# Patient Record
Sex: FEMALE | Race: WHITE | NOT HISPANIC OR LATINO | Employment: FULL TIME | URBAN - METROPOLITAN AREA
[De-identification: names, ages, dates, MRNs, and addresses within clinical notes are randomized per-mention and may not be internally consistent; named-entity substitution may affect disease eponyms.]

---

## 2017-02-22 ENCOUNTER — TELEPHONE (OUTPATIENT)
Dept: FAMILY MEDICINE | Facility: CLINIC | Age: 32
End: 2017-02-22

## 2017-02-22 NOTE — TELEPHONE ENCOUNTER
Called and spoke to patient, MyChart still pending.  [facility] and [facility] help [facility] for direction.  Verbalized good understanding.   Audra Ureña RN

## 2017-02-22 NOTE — TELEPHONE ENCOUNTER
Patient calling, she has had a sinus infection/cold going on for about 7 weeks, she was feeling better, but ears never fully resolved. Now she is having sinus symptoms again. She is wondering if she needs to be seen.

## 2017-02-22 NOTE — TELEPHONE ENCOUNTER
Per patient, has had sinus symptoms x 7 weeks.    Ears feel like there is a water sensation, coughing, sneezing.  Ear feels like they want to be cleaned constantly.  C/o sinus pain/pressure, worse with positional changes.   +PND.  Denies tooth pain.    Tried, Nyquil/dayquil, mucinex, ibuprofen,  Sinus tylenol.  Label said could not use Nasonex due to thyroid disorder.  Takes zyrtec everyday.    Has not tried neti pot or nasal saline rinse.       Patient is given [employer] help desk number, [phone]

## 2017-02-23 NOTE — TELEPHONE ENCOUNTER
Left message on answering machine for patient/parent to call back.   [phone].  Audra Ureña RN

## 2017-02-24 NOTE — TELEPHONE ENCOUNTER
Left message on answering machine for patient/parent to call back.   [phone].  Audra Ureña RN

## 2017-02-28 DIAGNOSIS — E03.9 HYPOTHYROIDISM: Primary | ICD-10-CM

## 2017-02-28 RX ORDER — LEVOTHYROXINE SODIUM 50 UG/1
50 TABLET ORAL DAILY
Qty: 30 TABLET | Refills: 0 | Status: SHIPPED | OUTPATIENT
Start: 2017-02-28 | End: 2017-03-23

## 2017-02-28 NOTE — LETTER
[facility]                                           [address] [place]  [address]    February 28, 2017    Lalitha Cortez  [address]   [place] [address]    Dear Lalitha,       We recently received a refill request for levothyroxine.  We have refilled this for a one time 30 day supply only because you are due for a:    Thyroid office visit and fasting lab appointment      Please schedule this lab appointment 4-5 days prior to the office visit.     Please call at your earliest convenience so that there will not be a delay with your future refills.          Thank you,   Your [facility] Care Team/joao  [phone]

## 2017-03-23 ENCOUNTER — OFFICE VISIT (OUTPATIENT)
Dept: FAMILY MEDICINE | Facility: CLINIC | Age: 32
End: 2017-03-23
Payer: COMMERCIAL

## 2017-03-23 VITALS
SYSTOLIC BLOOD PRESSURE: 108 MMHG | BODY MASS INDEX: 19.63 KG/M2 | DIASTOLIC BLOOD PRESSURE: 68 MMHG | TEMPERATURE: 98.2 F | WEIGHT: 115 LBS | HEART RATE: 80 BPM | HEIGHT: 64 IN

## 2017-03-23 DIAGNOSIS — E03.9 HYPOTHYROIDISM, UNSPECIFIED TYPE: Primary | ICD-10-CM

## 2017-03-23 LAB — TSH SERPL DL<=0.005 MIU/L-ACNC: 2.22 MU/L (ref 0.4–4)

## 2017-03-23 PROCEDURE — 36415 COLL VENOUS BLD VENIPUNCTURE: CPT | Performed by: PHYSICIAN ASSISTANT

## 2017-03-23 PROCEDURE — 84443 ASSAY THYROID STIM HORMONE: CPT | Performed by: PHYSICIAN ASSISTANT

## 2017-03-23 PROCEDURE — 99213 OFFICE O/P EST LOW 20 MIN: CPT | Performed by: PHYSICIAN ASSISTANT

## 2017-03-23 RX ORDER — LEVOTHYROXINE SODIUM 50 UG/1
50 TABLET ORAL DAILY
Qty: 90 TABLET | Refills: 3 | Status: SHIPPED | OUTPATIENT
Start: 2017-03-23 | End: 2018-04-20

## 2017-03-23 NOTE — NURSING NOTE
"Chief Complaint   Patient presents with     Thyroid Disease       Initial /68 (Cuff Size: Adult Regular)  Pulse 80  Temp 98.2  F (36.8  C) (Oral)  Ht 5' 4\" (1.626 m)  Wt 115 lb (52.2 kg)  BMI 19.74 kg/m2 Estimated body mass index is 19.74 kg/(m^2) as calculated from the following:    Height as of this encounter: 5' 4\" (1.626 m).    Weight as of this encounter: 115 lb (52.2 kg).  Medication Reconciliation: complete    KIRSTIE Ortiz MA    "

## 2017-03-23 NOTE — MR AVS SNAPSHOT
"              After Visit Summary   3/23/2017    Lalitha Cortez    MRN: [id number]           Patient Information     Date Of Birth          1985        Visit Information        Provider Department      3/23/2017 8:50 AM Kehr, Kristen M, PA-C [facility]        Today's Diagnoses     Hypothyroidism, unspecified type    -  1       Follow-ups after your visit        Who to contact     If you have questions or need follow up information about today's clinic visit or your schedule please contact [facility] directly at [phone].  Normal or non-critical lab and imaging results will be communicated to you by [employer], letter or phone within 4 business days after the clinic has received the results. If you do not hear from us within 7 days, please contact the clinic through [employer] or phone. If you have a critical or abnormal lab result, we will notify you by phone as soon as possible.  Submit refill requests through [employer] or call your pharmacy and they will forward the refill request to us. Please allow 3 business days for your refill to be completed.          Additional Information About Your Visit        MyChart Information     [employer] lets you send messages to your doctor, view your test results, renew your prescriptions, schedule appointments and more. To sign up, go to [email or web address] . Click on \"Log in\" on the left side of the screen, which will take you to the Welcome page. Then click on \"Sign up Now\" on the right side of the page.     You will be asked to enter the access code listed below, as well as some personal information. Please follow the directions to create your username and password.     Your access code is: [email or web address]  Expires: 2017  1:06 PM     Your access code will  in 90 days. If you need help or a new code, please call your [facility] or [phone].        Care EveryWhere ID     This is your Care EveryWhere ID. This could be used by other " "organizations to access your [place] medical records  [id number]        Your Vitals Were     Pulse Temperature Height BMI (Body Mass Index)          80 98.2  F (36.8  C) (Oral) 5' 4\" (1.626 m) 19.74 kg/m2         Blood Pressure from Last 3 Encounters:   03/23/17 108/68   12/07/16 116/64   10/06/16 104/67    Weight from Last 3 Encounters:   03/23/17 115 lb (52.2 kg)   12/07/16 113 lb (51.3 kg)   10/06/16 110 lb (49.9 kg)              We Performed the Following     TSH with free T4 reflex        Primary Care Provider Office Phone # Fax #    Kristen M Kehr, PA-C [phone]       [facility] [address] [address] [address]        Thank you!     Thank you for choosing [facility]  for your care. Our goal is always to provide you with excellent care. Hearing back from our patients is one way we can continue to improve our services. Please take a few minutes to complete the written survey that you may receive in the mail after your visit with us. Thank you!             Your Updated Medication List - Protect others around you: Learn how to safely use, store and throw away your medicines at www.disposemymeds.org.          This list is accurate as of: 3/23/17 12:33 PM.  Always use your most recent med list.                   Brand Name Dispense Instructions for use    diphenoxylate-atropine 2.5-0.025 MG per tablet    LOMOTIL    60 tablet    Take 2 tablets by mouth 4 times daily as needed for diarrhea       levothyroxine 50 MCG tablet    SYNTHROID    30 tablet    Take 1 tablet (50 mcg) by mouth daily       MULTIVITAMIN PO      Take  by mouth. Takes 2 daily       norgestim-eth estrad triphasic 0.18/0.215/0.25 MG-35 MCG per tablet    TRI-SPRINTEC    84 tablet    Take 1 tablet by mouth daily       ZYRTEC 10 MG tablet   Generic drug:  cetirizine      Take 10 mg by mouth daily.         "

## 2017-03-23 NOTE — LETTER
[facility]  [address] [address] [phone]        March 24, 2017    Lalitha Cortez  [address]   [place] [address]            Dear Lalitha,    The results of your recent thyroid tests were normal.  Below is a copy of the results.  It was a pleasure to see you at your last appointment.    If you have any questions or concerns, please call myself or my nurse at [phone].    Sincerely,    Kristen Kehr, PA-C/joao    Results for orders placed or performed in visit on 03/23/17   TSH with free T4 reflex   Result Value Ref Range    TSH 2.22 0.40 - 4.00 mU/L

## 2017-03-23 NOTE — PROGRESS NOTES
SUBJECTIVE:                                                    Lalitha Cortez is a 31 year old female who presents to clinic today for the following health issues:  Lalitha is here today for medication recheck for thyroid. She is doing well with her current dose of medication. She is taking 50 mcg daily.     Hypothyroidism Follow-up      Since last visit, patient describes the following symptoms: Weight stable, no hair loss, no skin changes, no constipation, no loose stools       Amount of exercise or physical activity:     Problems taking medications regularly: No    Medication side effects: none    Diet:         Problem list and histories reviewed & adjusted, as indicated.  Additional history: as documented    Patient Active Problem List   Diagnosis     CARDIOVASCULAR SCREENING; LDL GOAL LESS THAN 160     Seasonal allergic rhinitis     Acne     Giant papillary conjunctivitis     Conjunctivitis, allergic     Dry eyes     Blepharitis     Hypothyroidism     Breakthrough bleeding on depo provera     Past Surgical History:   Procedure Laterality Date     NO HISTORY OF SURGERY         Social History   Substance Use Topics     Smoking status: Former Smoker     Quit date: 8/1/2008     Smokeless tobacco: Never Used      Comment: Lives in smoke free household     Alcohol use Yes      Comment: 2/PER WEEK     Family History   Problem Relation Age of Onset     Neurologic Disorder Mother      MSAND LUPUS     Thyroid Disease Mother      Depression Father      Thyroid Disease Father      Depression Brother      Thyroid Disease Maternal Grandmother      CANCER Maternal Grandmother      Thyroid Disease Maternal Grandfather      Thyroid Disease Paternal Grandmother      Thyroid Disease Paternal Grandfather      DIABETES No family hx of      Hypertension No family hx of      CEREBROVASCULAR DISEASE No family hx of      Glaucoma No family hx of      Macular Degeneration No family hx of          Current Outpatient Prescriptions  "  Medication Sig Dispense Refill     levothyroxine (SYNTHROID) 50 MCG tablet Take 1 tablet (50 mcg) by mouth daily 30 tablet 0     norgestim-eth estrad triphasic (TRI-SPRINTEC) 0.18/0.215/0.25 MG-35 MCG per tablet Take 1 tablet by mouth daily 84 tablet 3     diphenoxylate-atropine (LOMOTIL) 2.5-0.025 MG per tablet Take 2 tablets by mouth 4 times daily as needed for diarrhea 60 tablet 0     Multiple Vitamins-Minerals (MULTIVITAMIN OR) Take  by mouth. Takes 2 daily       cetirizine (ZYRTEC) 10 MG tablet Take 10 mg by mouth daily.       Allergies   Allergen Reactions     Nkda [No Known Drug Allergies]      Seasonal Allergies        Reviewed and updated as needed this visit by clinical staff  Tobacco  Allergies  Meds  Med Hx  Surg Hx  Fam Hx  Soc Hx      Reviewed and updated as needed this visit by Provider         ROS:  Constitutional, HEENT, cardiovascular, pulmonary, gi and gu systems are negative, except as otherwise noted.    OBJECTIVE:                                                    /68 (Cuff Size: Adult Regular)  Pulse 80  Temp 98.2  F (36.8  C) (Oral)  Ht 5' 4\" (1.626 m)  Wt 115 lb (52.2 kg)  BMI 19.74 kg/m2  Body mass index is 19.74 kg/(m^2).  GENERAL: healthy, alert and no distress  NECK: no adenopathy, no asymmetry, masses, or scars and thyroid normal to palpation  MS: no gross musculoskeletal defects noted, no edema  SKIN: no suspicious lesions or rashes  PSYCH: mentation appears normal, affect normal/bright    Diagnostic Test Results:  none      ASSESSMENT/PLAN:                                                      1. Hypothyroidism, unspecified type  TSH done today.   Plan refill after results are back.   - TSH with free T4 reflex      Kristen M. Kehr, PA-C  [facility]  "

## 2017-08-31 ENCOUNTER — TELEPHONE (OUTPATIENT)
Dept: FAMILY MEDICINE | Facility: CLINIC | Age: 32
End: 2017-08-31

## 2017-08-31 NOTE — TELEPHONE ENCOUNTER
Patient informed last depo provera injection 10/6/16, started BCP 12/7/16.   Stopped OCP February or March, 2017.  Patient states she has read that it can take 1-2 years for her to get pregnant due to Depo Provera.   Informed patient that can become pregnant 13 week post Depo injection.  Patient notes has very irregular periods, but has been trying to count days of cycle.  Discussed this would be harder due to irregular menses.    Advise Ovulation predictor kits from pharmacy, monitor per instructions and have relations 3 days during surge of LH, hormone.  Advise patient to call back with questions or concerns.   The patient/parent agrees with the plan and verbalized good understanding.    FYI, Kristen Kehr, PA-C.  Audra Ureña RN

## 2017-11-14 ENCOUNTER — TELEPHONE (OUTPATIENT)
Dept: FAMILY MEDICINE | Facility: CLINIC | Age: 32
End: 2017-11-14

## 2017-11-14 NOTE — TELEPHONE ENCOUNTER
Congratulations...  Patient has schedule appointment tomorrow.  Patient is informed okay to take Levothyroxine.  Will wait to discuss allergy medications tomorrow with Kristen Kehr, PA-C.  Verbalized good understanding.   Audra Ureña RN

## 2017-11-15 ENCOUNTER — OFFICE VISIT (OUTPATIENT)
Dept: FAMILY MEDICINE | Facility: CLINIC | Age: 32
End: 2017-11-15
Payer: COMMERCIAL

## 2017-11-15 VITALS
WEIGHT: 112.8 LBS | OXYGEN SATURATION: 100 % | DIASTOLIC BLOOD PRESSURE: 68 MMHG | HEART RATE: 56 BPM | BODY MASS INDEX: 19.36 KG/M2 | SYSTOLIC BLOOD PRESSURE: 116 MMHG

## 2017-11-15 DIAGNOSIS — N92.6 MISSED MENSES: ICD-10-CM

## 2017-11-15 DIAGNOSIS — Z32.01 PREGNANCY TEST POSITIVE: Primary | ICD-10-CM

## 2017-11-15 LAB — BETA HCG QUAL IFA URINE: POSITIVE

## 2017-11-15 PROCEDURE — 84703 CHORIONIC GONADOTROPIN ASSAY: CPT | Performed by: PHYSICIAN ASSISTANT

## 2017-11-15 PROCEDURE — 99213 OFFICE O/P EST LOW 20 MIN: CPT | Performed by: PHYSICIAN ASSISTANT

## 2017-11-15 RX ORDER — PRENATAL VIT/IRON FUM/FOLIC AC 27MG-0.8MG
1 TABLET ORAL DAILY
COMMUNITY
End: 2022-02-16

## 2017-11-15 NOTE — PROGRESS NOTES
SUBJECTIVE:                                                    Lalitha Cortez is a 32 year old female who presents to clinic today for the following health issues:    Pt here to confirm pregnancy, had positive at home test.  LMP 10/14/17  Due date about 7/21/18    1st pregnancy. Mild fatigue. sensitive to smells, cramping. Mild spotting 2 days ago but none since.     No smoking.   Last ETOH was a glass of wine last Saturday. Doesn't regularly drink. Didn't know she was pregnant until yesterday with home test.     Problem list and histories reviewed & adjusted, as indicated.  Additional history: as documented    Patient Active Problem List   Diagnosis     CARDIOVASCULAR SCREENING; LDL GOAL LESS THAN 160     Seasonal allergic rhinitis     Acne     Giant papillary conjunctivitis     Conjunctivitis, allergic     Dry eyes     Blepharitis     Hypothyroidism     Breakthrough bleeding on depo provera     Past Surgical History:   Procedure Laterality Date     NO HISTORY OF SURGERY         Social History   Substance Use Topics     Smoking status: Former Smoker     Quit date: 8/1/2008     Smokeless tobacco: Never Used      Comment: Lives in smoke free household     Alcohol use Yes      Comment: 2/PER WEEK     Family History   Problem Relation Age of Onset     Neurologic Disorder Mother      MSAND LUPUS     Thyroid Disease Mother      Depression Father      Thyroid Disease Father      Depression Brother      Thyroid Disease Maternal Grandmother      CANCER Maternal Grandmother      Thyroid Disease Maternal Grandfather      Thyroid Disease Paternal Grandmother      Thyroid Disease Paternal Grandfather      DIABETES No family hx of      Hypertension No family hx of      CEREBROVASCULAR DISEASE No family hx of      Glaucoma No family hx of      Macular Degeneration No family hx of          Current Outpatient Prescriptions   Medication Sig Dispense Refill     Prenatal Vit-Fe Fumarate-FA (PRENATAL MULTIVITAMIN PLUS IRON) 27-0.8 MG  TABS per tablet Take 1 tablet by mouth daily       levothyroxine (SYNTHROID) 50 MCG tablet Take 1 tablet (50 mcg) by mouth daily 90 tablet 3     cetirizine (ZYRTEC) 10 MG tablet Take 10 mg by mouth daily.       Allergies   Allergen Reactions     Nkda [No Known Drug Allergies]      Seasonal Allergies      Labs reviewed in EPIC      ROS:  Constitutional, HEENT, cardiovascular, pulmonary, gi and gu systems are negative, except as otherwise noted.      OBJECTIVE:   /68  Pulse 56  Wt 112 lb 12.8 oz (51.2 kg)  LMP 10/14/2017  SpO2 100%  BMI 19.36 kg/m2  Body mass index is 19.36 kg/(m^2).  GENERAL: healthy, alert and no distress  Head: Normocephalic, atraumatic.  Eyes: Conjunctiva clear, non icteric. PERRLA.  Ears: External ears and TMs normal BL.  Nose: Septum midline, nasal mucosa pink and moist. No discharge.  Mouth / Throat: Normal dentition.  No oral lesions. Pharynx non erythematous, tonsils without hypertrophy.  Neck: Supple, no enlarged LN, trachea midline.  Heart: S1 and S2 normal, no murmurs, clicks, gallops or rubs. Regular rate and rhythm.  Chest: Clear; no wheezes or rales.  The abdomen is soft with diffuse mild tenderness, guarding, mass, rebound or organomegaly. Bowel sounds are normal.    Diagnostic Test Results:  Results for orders placed or performed in visit on 11/15/17 (from the past 24 hour(s))   Beta HCG qual IFA urine   Result Value Ref Range    Beta HCG Qual IFA Urine Positive (A) NEG^Negative          ASSESSMENT/PLAN:         ICD-10-CM    1. Pregnancy test positive Z32.01    2. Missed menses N92.6 Beta HCG qual IFA urine   no ETOH or smoking  No changing of cat litter.  warning signs discussed.  She is going to call insurance to see who is covered for OB and make follow up apt.     Williams Beck PA-C  [facility]

## 2017-11-15 NOTE — NURSING NOTE
"Chief Complaint   Patient presents with     Confirmation Of Pregnancy       Initial /68  Pulse 56  Wt 112 lb 12.8 oz (51.2 kg)  LMP 10/14/2017  SpO2 100%  BMI 19.36 kg/m2 Estimated body mass index is 19.36 kg/(m^2) as calculated from the following:    Height as of 3/23/17: 5' 4\" (1.626 m).    Weight as of this encounter: 112 lb 12.8 oz (51.2 kg).  Medication Reconciliation: complete  Kelly Vail CMA    "

## 2017-11-17 ENCOUNTER — TELEPHONE (OUTPATIENT)
Dept: FAMILY MEDICINE | Facility: CLINIC | Age: 32
End: 2017-11-17

## 2017-11-17 ENCOUNTER — TELEPHONE (OUTPATIENT)
Dept: OBGYN | Facility: CLINIC | Age: 32
End: 2017-11-17

## 2017-11-17 NOTE — TELEPHONE ENCOUNTER
Return call to patient. LMP 10-14-17. 4w 3d. She reported intermittent spotting that started on 11-09-17. Resolved on 11-10-17 then noted some on 11-15-17. Patient reported dark blood on toilet tissue after voiding and last noc a scant amt in her underwear. She denied abdomen cramping. Patient has been drinking a lot of water. Explained implantation bleed. Recommended pelvic rest until her FOB appt on 01-02-18. Encouraged her to call back to clinic if sxs change or worsen. Danii Bond RN, BAN

## 2017-11-17 NOTE — TELEPHONE ENCOUNTER
PT was confirmed on 11/15/2017, PT states that she is feeling cramps, spotting on the 15 th, color is a dark creamy brown. PT would like to know if she should worry this is her first pregnancy.Would like a call back.  Rufina Pacheco, [facility]

## 2017-11-17 NOTE — TELEPHONE ENCOUNTER
See post telephone encounter 11/17/17, [facility]/OB, addressed by RN.   Has scheduled appointment with OB dept.   Will close encounter.  Audra Ureña RN

## 2017-11-17 NOTE — TELEPHONE ENCOUNTER
Patient states her pregnancy was confirmed earlier this week and she would like your recommendation on an OB provider that she should see.  States she is 5 weeks along and spotting so she is being transferred to OB nurse hotline.  Please call.    Thank you.

## 2017-11-20 ENCOUNTER — NURSE TRIAGE (OUTPATIENT)
Dept: NURSING | Facility: CLINIC | Age: 32
End: 2017-11-20

## 2017-11-20 ENCOUNTER — TELEPHONE (OUTPATIENT)
Dept: OBGYN | Facility: CLINIC | Age: 32
End: 2017-11-20

## 2017-11-20 DIAGNOSIS — O20.9 BLEEDING IN EARLY PREGNANCY: Primary | ICD-10-CM

## 2017-11-20 NOTE — TELEPHONE ENCOUNTER
Lalitha is bleeding vaginally and just found out that she was pregnant.    Bleeding has been going on for four days, in the am.  Lalitha has not   Been in for first ob visit.

## 2017-11-20 NOTE — TELEPHONE ENCOUNTER
Patient calling, she was seen in ER today for vaginal bleeding during pregnancy. They suggested she come in for a repeat HCG test on Wednesday.

## 2017-11-21 NOTE — TELEPHONE ENCOUNTER
Patient was seen at [facility] yesterday.  Ultrasound showed no visible intrauterine pregnancy, possible spontaneous , non-visualized ectopic pregnancy.  HCG qualitative was positive.  HCG quantity was 581.  Patient is O positive.  Told to repeat HCG quant on 2017. I spoke with Dr. Agbeh in clinic today.  Orders placed for blood work tomorrow 2017 and again in 1 week.  He will order the ultrasound once the HCG level reaches a high enough level.  Noted patient has an appointment with lab tomorrow at 7:50am.  NEed current update on bleeding.  Left a message for patient to call back at [phone] and ask for Viktoria in women's.  Viktoria Last RN

## 2017-11-22 ENCOUNTER — TELEPHONE (OUTPATIENT)
Dept: OBGYN | Facility: CLINIC | Age: 32
End: 2017-11-22

## 2017-11-22 ENCOUNTER — NURSE TRIAGE (OUTPATIENT)
Dept: NURSING | Facility: CLINIC | Age: 32
End: 2017-11-22

## 2017-11-22 DIAGNOSIS — O20.9 BLEEDING IN EARLY PREGNANCY: ICD-10-CM

## 2017-11-22 LAB — B-HCG SERPL-ACNC: 242 IU/L (ref 0–5)

## 2017-11-22 PROCEDURE — 36415 COLL VENOUS BLD VENIPUNCTURE: CPT | Performed by: OBSTETRICS & GYNECOLOGY

## 2017-11-22 PROCEDURE — 84702 CHORIONIC GONADOTROPIN TEST: CPT | Performed by: OBSTETRICS & GYNECOLOGY

## 2017-11-22 NOTE — TELEPHONE ENCOUNTER
I called the patient and reviewed her serial quant values and US results.  She understands that she is miscarrying and would prefer to avoid surgical intervention at this time.  Her blood type is O+ so she is not a rhogam candidate.  All her questions were addressed and she feels that she has a good support system.  Will f/u with Dr. Agbeh next week or earlier prn.

## 2017-11-22 NOTE — TELEPHONE ENCOUNTER
Lalitha is calling for results of HCG Lab.  FNA relayed results and Lalitha is requesting to speak with clinic direct.  FNA transferred through to [facility]/OB.

## 2017-11-22 NOTE — TELEPHONE ENCOUNTER
Patient was transferred to primary care but should have been sent to OB.  Transferred patient to the OB department.    Patient states she is 5 weeks pregnant.  Went to ER for brown spotting.  She had hcg 581 and 48 hours later hcg 242.  This morning it was bright red blood on the tissue paper and toilet.  Has lightened up since an hour ago.    Mayda Guerrier RN,   [facility]

## 2017-11-22 NOTE — TELEPHONE ENCOUNTER
Patient is calling to follow up on her lab results from today. [phone] Please return patient's call this morning if possible She states that her brown spotting has turned a red color.

## 2017-11-22 NOTE — TELEPHONE ENCOUNTER
Called patient to reschedule an upcoming appointment due to provider being out.  Patient states she had labs drawn this morning and is looking for the results.  Patient nervous as she is having bright red bleeding.  Please review results.

## 2017-11-22 NOTE — TELEPHONE ENCOUNTER
"Blind transfer from primary care. Patient requested lab results. Noted FNA RN gave results without physician's review or orders. Patient stated she was seen in [facility] ER on 11-20-17 and her HCG was 581. Patient was to repeat in 48 hours. Patient stated she has been waiting for lab results as this has been the \"longest 48 hours of my life.\" Patient reported yesterday she had some lower back pain but \"not too bad today.\" Patient denied abdominal cramping. Patient stated about one hour ago she started with bright red bleeding. Patient stated she \"just want to know if this means I am miscarrying.\" Patient reported LMP 10-14-17. Patient 11-21-17 notes patient is O+ blood type.   Explained Dr. Agbeh is not in clinic today. Offered her an appt with another physician today and patient declined. She stated she just wants to know if she is having a miscarriage. Explained will consult Dr. Hawley as she is the physician on site and will call back. Verified call back number.  Message handled by Nurse Triage with [facility] - provider name: Dr. Hawley.  Agreed to call patient.    Will route to Dr. Hawley for review & orders. Danii Bond, FIORELLA, BAN      "

## 2017-11-24 ENCOUNTER — NURSE TRIAGE (OUTPATIENT)
Dept: NURSING | Facility: CLINIC | Age: 32
End: 2017-11-24

## 2017-11-24 NOTE — TELEPHONE ENCOUNTER
Lalitha is currently having a miscarriage.  Lalitha is having back pain and cramps and waking her up.  Lalitha has questions regarding miscarriage and symptoms.  No bleeding heavily or severe cramping.

## 2017-11-24 NOTE — TELEPHONE ENCOUNTER
Additional Information    Negative: Shock suspected (e.g., cold/pale/clammy skin, too weak to stand, low BP, rapid pulse)    Negative: Difficult to awaken or acting confused  (e.g., disoriented, slurred speech)    Negative: Passed out (i.e., lost consciousness, collapsed and was not responding)    Negative: Sounds like a life-threatening emergency to the triager    Negative: [1] Threatened miscarriage (threatened ) suspected AND [2] has not been examined by a HCP    Negative: [1] Abdominal pain is main symptom and [2] NO vaginal bleeding in past 24 hours    Negative: [1] Vaginal bleeding is main symptom and [2] more than 4 weeks since medical visit    Negative: Not pregnant or pregnancy status unknown    Negative: SEVERE abdominal pain    Negative: [1] SEVERE vaginal bleeding(i.e., soaking 2 pads / hour, large blood clots) AND [2] present 2 or more hours    Negative: [1] MODERATE vaginal bleeding (i.e., soaking 1 pad / hour; clots) AND [2] present > 6 hours    Negative: Passed tissue (e.g., gray-white)    Negative: Pale skin (pallor) of new onset or worsening    Negative: Patient sounds very sick or weak to the triager    Negative: [1] Constant abdominal pain AND [2] present > 2 hours    Negative: Caller has URGENT question and triager unable to answer question    Negative: Caller has NON-URGENT question and triager unable to answer question    Negative: MILD vaginal bleeding (i.e., less than one pad / hour)    Negative: Symptoms worsening  (i.e., vaginal bleeding, abdominal pain)    Negative: Ultrasound was NOT performed    Negative: Ultrasound was NOT normal OR caller does not know what ultrasound showed (i.e., live intrauterine pregnancy with fetal cardiac activity [heart beat])    Negative: Not feeling pregnant any longer (e.g., breast tenderness has disappeared)    Negative: SPOTTING (i.e., pinkish / brownish mucous discharge; 1-2 pads a day; no clots)    Negative: Symptoms have improved or gone away   (i.e., vaginal bleeding, abdominal pain)    Negative: Ultrasound showed a normal pregnancy in the uterus (i.e., live intrauterine pregnancy with fetal cardiac activity [heart beat])    Threatened , questions about    Protocols used:  - THREATENED [facility]CARRIAGE [facility]

## 2017-11-27 ENCOUNTER — OFFICE VISIT (OUTPATIENT)
Dept: OBGYN | Facility: CLINIC | Age: 32
End: 2017-11-27
Payer: COMMERCIAL

## 2017-11-27 ENCOUNTER — TELEPHONE (OUTPATIENT)
Dept: OBGYN | Facility: CLINIC | Age: 32
End: 2017-11-27

## 2017-11-27 VITALS
HEART RATE: 83 BPM | TEMPERATURE: 97.7 F | HEIGHT: 64 IN | BODY MASS INDEX: 19.29 KG/M2 | DIASTOLIC BLOOD PRESSURE: 63 MMHG | SYSTOLIC BLOOD PRESSURE: 115 MMHG | WEIGHT: 113 LBS

## 2017-11-27 DIAGNOSIS — O02.1 MISSED ABORTION: Primary | ICD-10-CM

## 2017-11-27 LAB — B-HCG SERPL-ACNC: 666 IU/L (ref 0–5)

## 2017-11-27 PROCEDURE — 99203 OFFICE O/P NEW LOW 30 MIN: CPT | Performed by: ADVANCED PRACTICE MIDWIFE

## 2017-11-27 PROCEDURE — 36415 COLL VENOUS BLD VENIPUNCTURE: CPT | Performed by: ADVANCED PRACTICE MIDWIFE

## 2017-11-27 PROCEDURE — 84702 CHORIONIC GONADOTROPIN TEST: CPT | Performed by: ADVANCED PRACTICE MIDWIFE

## 2017-11-27 ASSESSMENT — PAIN SCALES - GENERAL: PAINLEVEL: MODERATE PAIN (5)

## 2017-11-27 NOTE — MR AVS SNAPSHOT
After Visit Summary   2017    Lalitha Cortez    MRN: [id number]           Patient Information     Date Of Birth          1985        Visit Information        Provider Department      2017 1:30 PM Tesha Omer APRN CNM [facility]        Today's Diagnoses     Missed     -  1       Follow-ups after your visit        Your next 10 appointments already scheduled     Dec 06, 2017  8:15 AM CST   Office Visit with Kranthi Jernigan MD   [facility] ([facility])    [address] [place] [id number] [phone]           Bring a current list of meds and any records pertaining to this visit. For Physicals, please bring immunization records and any forms needing to be filled out. Please arrive 10 minutes early to complete paperwork.              Who to contact     If you have questions or need follow up information about today's clinic visit or your schedule please contact [facility] directly at [phone].  Normal or non-critical lab and imaging results will be communicated to you by PaymentOnehart, letter or phone within 4 business days after the clinic has received the results. If you do not hear from us within 7 days, please contact the clinic through Karisma Kidz or phone. If you have a critical or abnormal lab result, we will notify you by phone as soon as possible.  Submit refill requests through Karisma Kidz or call your pharmacy and they will forward the refill request to us. Please allow 3 business days for your refill to be completed.          Additional Information About Your Visit        PaymentOnehart Information     Karisma Kidz gives you secure access to your electronic health record. If you see a primary care provider, you can also send messages to your care team and make appointments. If you have questions, please call your primary care clinic.  If you do not have a primary care provider, please call [phone] and  "they will assist you.        Care EveryWhere ID     This is your Care EveryWhere ID. This could be used by other organizations to access your [place] medical records  [id number]        Your Vitals Were     Pulse Temperature Height Last Period BMI (Body Mass Index)       83 97.7  F (36.5  C) (Oral) 5' 4.25\" (1.632 m) 10/14/2017 (Exact Date) 19.25 kg/m2        Blood Pressure from Last 3 Encounters:   11/27/17 115/63   11/15/17 116/68   03/23/17 108/68    Weight from Last 3 Encounters:   11/27/17 113 lb (51.3 kg)   11/15/17 112 lb 12.8 oz (51.2 kg)   03/23/17 115 lb (52.2 kg)              We Performed the Following     HCG quantitative pregnancy          Today's Medication Changes          These changes are accurate as of: 11/27/17  2:32 PM.  If you have any questions, ask your nurse or doctor.               Stop taking these medicines if you haven't already. Please contact your care team if you have questions.     ZYRTEC 10 MG tablet   Generic drug:  cetirizine   Stopped by:  Tesha Omer APRN CNM                    Primary Care Provider Office Phone # Fax #    Kristen M Kehr, PA-C [phone] [address] [facility] [address]        Equal Access to Services     PINKY HOUSER AH: Hadii kallie ku hadasho Soomaali, waaxda luqadaha, qaybta kaalmada antonia, rigoberto yu. So [facility] [phone].    ATENCIÓN: Si habla español, tiene a heredia disposición servicios gratuitos de asistencia lingüística. Nichelle al [phone].    We comply with applicable federal civil rights laws and [place] laws. We do not discriminate on the basis of race, color, national origin, age, disability, sex, sexual orientation, or gender identity.            Thank you!     Thank you for choosing [facility]  for your care. Our goal is always to provide you with excellent care. Hearing back from our patients is one way we can continue to improve our services. Please take a few " minutes to complete the written survey that you may receive in the mail after your visit with us. Thank you!             Your Updated Medication List - Protect others around you: Learn how to safely use, store and throw away your medicines at www.disposemymeds.org.          This list is accurate as of: 11/27/17  2:32 PM.  Always use your most recent med list.                   Brand Name Dispense Instructions for use Diagnosis    levothyroxine 50 MCG tablet    SYNTHROID    90 tablet    Take 1 tablet (50 mcg) by mouth daily    Hypothyroidism, unspecified type       prenatal multivitamin plus iron 27-0.8 MG Tabs per tablet      Take 1 tablet by mouth daily

## 2017-11-27 NOTE — PROGRESS NOTES
Lalitha Cortez is a 32 year old female presenting the the clinic today for a follow up after a spontaneous miscarriage diagnosed last week Pt was approximately 5-6 weeks pregnant at the time of the loss.  She continues to have back pain that she rates as 3-4/10 and left sided abdominal discomfort.   Continues to have small amounts of spotting  Emotions expressed are appropriate in relationship to the miscarriage.  Patient Active Problem List   Diagnosis     CARDIOVASCULAR SCREENING; LDL GOAL LESS THAN 160     Seasonal allergic rhinitis     Acne     Conjunctivitis, allergic     Dry eyes     Blepharitis     Hypothyroidism       Current Outpatient Prescriptions:      Prenatal Vit-Fe Fumarate-FA (PRENATAL MULTIVITAMIN PLUS IRON) 27-0.8 MG TABS per tablet, Take 1 tablet by mouth daily, Disp: , Rfl:      levothyroxine (SYNTHROID) 50 MCG tablet, Take 1 tablet (50 mcg) by mouth daily, Disp: 90 tablet, Rfl: 3  Past Medical History:   Diagnosis Date     GERD (gastroesophageal reflux disease)      Hypothyroid      Intradermal nevus 03/27/2007    right arm-irritated intradermal nevus     Past Surgical History:   Procedure Laterality Date     NO HISTORY OF SURGERY           REVIEW OF SYMPTOMS   ROS: 10 point ROS neg other than the symptoms noted above in the HPI.    EXAM  Vulva: No eternal lesions, normal hair distribution.  BUS: not enlarged  Vagina: Normal discharge well rugated, no lesions noted   Cervix:  Smooth, without visible lesions or discharge.  No CMT  Uterus:  Normal size anteverted, non tender  Adnexa:  Without masses or tenderness.   Blood type: O pos   Beta on 11/20 was 581 and on 11/22 242  ASSESSMENT  Complete spontaneous miscarriage   Appropriate grieving    PLAN  Discussed possible causes of this SAB and risk for future SAB  Encouraged to postpone pregnancy attempts until after next menses  Continue PNV  or folic acid and maintenance of a healthy life style while  avoiding harmful behaviors such as alcohol,   tobacco and drug use.   Recheck beta today and has follow up next week with Dr Jernigan.    Visit length 30 min with >50% spent in counseling regarding current loss, grieving process and future pregnancies.

## 2017-11-27 NOTE — TELEPHONE ENCOUNTER
"Phone call from patient. Patient stated she knows she is having a miscarriage thinks it started on 11-22-17. Patient has had various amts of bleeding since then but no heavy bleeding or severe abdominal cramping. Patient reported concerns about possible ectopic pregnancy as she has LLQ pain over the past several days. Patient stated the area is sensitive to touch and \"feels hard.\" Patient stated some times pain is 8/10 but today it is 5/10. Patient stated she is using a heating pad and Tylenol and not relieving pain. Explained she need to be seen in clinic to be assessed and tx plan established. Patient agreed to be seen by BOOM Cha, CNP today at 1330. Danii Bond RN, BAN      "

## 2017-11-28 ENCOUNTER — TELEPHONE (OUTPATIENT)
Dept: OBGYN | Facility: CLINIC | Age: 32
End: 2017-11-28

## 2017-11-28 NOTE — TELEPHONE ENCOUNTER
Patient is aware she needs an ultrasound and an appointment with Dr. Ghosh today.  I called and spoke with her.  She continues to have left sided abdominal pain along with vaginal bleeding.  She is scheduled today at 1:10pm in [place] for the ultrasound.  She is aware to have a full bladder. She then follows up with Dr. Ghosh in [place].  We discussed the possibility of surgery and recovery both physical and mental.  I talked with her about the importance of the ultrasound and how that will determine the next step.  Patient understands and was thankful for me taking the time to talk her through this emotional time.  Viktoria Last RN

## 2017-11-28 NOTE — TELEPHONE ENCOUNTER
Pt called requesting to be scheduled right away for her US and appt with Dr. Ghosh.    Pt is scheduled with US at 1:10 today and Dr. Ghosh at 2:30 today.    Jessica Davis RN

## 2017-11-29 ENCOUNTER — NURSE TRIAGE (OUTPATIENT)
Dept: NURSING | Facility: CLINIC | Age: 32
End: 2017-11-29

## 2017-11-29 ENCOUNTER — TELEPHONE (OUTPATIENT)
Dept: OBGYN | Facility: OTHER | Age: 32
End: 2017-11-29

## 2017-11-29 NOTE — TELEPHONE ENCOUNTER
LM for the patient to return call to the clinic to discuss the below. Will await to hear from patient. Erin Leigh RN, BSN

## 2017-11-29 NOTE — TELEPHONE ENCOUNTER
Patient is on Oxycodone and Tylenol and is wondering if she can take a stool softner? She has a thyroid condition and ulcer and just wants to be sure.   Dr. Ch is her doctor, however she is not in, so just looking for nurse advice.

## 2017-11-30 NOTE — TELEPHONE ENCOUNTER
Dr Jernigan paged at 7:23pm  Lalitha Cortez, 1985,   [id number]  Surgery yesterday for ectopic pregnancy. Abdomen is very bloated and she is very uncomfortable.   [phone].  Rufina Doty will call back at 7:35pm if she needs a second page.  Rufina PITT RN [facility]

## 2017-12-01 ENCOUNTER — TELEPHONE (OUTPATIENT)
Dept: OBGYN | Facility: OTHER | Age: 32
End: 2017-12-01

## 2017-12-01 NOTE — PROGRESS NOTES
Case Report   Surgical Pathology                                Case: [id number]                                    Authorizing Provider:  Tatianna Ch DO     Collected:           11/28/2017 02:40 PM           Ordering Location:     [facility]       Received:            11/28/2017 04:42 PM                                  Operating Room                                                                Pathologist:           Lottie Ross MD                                                          Specimen:    Ectopic Pregnancy, Left Salpingotomy, left adenexal mass, likely ectopic                   Final Diagnosis   Fallopian tube, left salpingectomy: Cross sections of fallopian tube with adherent degenerating fibrin.   Electronically signed by Lottie Ross MD on 11/30/2017 at 1252   Comment        No chorionic villi are identified. Ultrasound failed to demonstrate a gestational sac within the adnexal structures. Clinical correlation is recommended.   Clinical Information  Left ectopic pregnancy    Gross Description        Left adnexal mass: Is a 2.5 cm in length, 0.8 cm diameter segment of fimbriated fallopian tube which is serially sectioned to reveal a pinpoint lumen. No hemorrhage or villous tissue is identified within the fallopian tube. Additionally received within the container is a 2.5 x 2.0 x 0.6 cm aggregate of blood clot and papillary tissue. No fetal parts or definitive chorionic villi are identified. The specimen is submitted entirely as follows:  A1-fallopian tube  A2-additional tissue fragments with blood clot  (CM)   Microscopic Description        Received is a fallopian tube with focal adherent fibrin along the external surface. The fimbria are intact. In addition fragments of laminated fibrin are identified. No chorionic villi are demonstrated       Subjective  32 year old non-pregnant female presents today as a post op from an operative laparoscopy, left salpingectomy for a  ruptured ectopic pregnancy on 11/28/17.  Patient states she is still having a dull abdominal ache.  No vaginal bleeding for the last 2 days.  Some vaginal spotting prior to this.  No problems urinating.  Normal bowel movements.  We reviewed patient's pathology and pictures from surgery.  All questions answered.  Patient has been having some breast pain and we discussed how this is likely hormonal and to continue to monitor it.  We discussed getting a quant today.  Her last was last week and was 12.  Will follow her closely with quants for her next pregnancy.  They have many questions regarding timing next pregnancy and statistics for next pregnancy.  We discussed an HSG and pelvic ultrasound due to the amount of blood that was seen in her uterus at the time of surgery.         ROS: 10 point ROS neg other than the symptoms noted above in the HPI.  Past Medical History:   Diagnosis Date     GERD (gastroesophageal reflux disease)      Hypothyroid      Intradermal nevus 03/27/2007    right arm-irritated intradermal nevus     Past Surgical History:   Procedure Laterality Date     NO HISTORY OF SURGERY       Family History   Problem Relation Age of Onset     Neurologic Disorder Mother      MSAND LUPUS     Thyroid Disease Mother      Depression Father      Thyroid Disease Father      Depression Brother      Thyroid Disease Maternal Grandmother      CANCER Maternal Grandmother      Thyroid Disease Maternal Grandfather      Thyroid Disease Paternal Grandmother      Thyroid Disease Paternal Grandfather      DIABETES No family hx of      Hypertension No family hx of      CEREBROVASCULAR DISEASE No family hx of      Glaucoma No family hx of      Macular Degeneration No family hx of      Social History   Substance Use Topics     Smoking status: Former Smoker     Quit date: 8/1/2008     Smokeless tobacco: Never Used      Comment: Lives in smoke free household     Alcohol use No         Objective  Vitals: /64  Pulse 79  Wt  111 lb (50.3 kg)  LMP 10/14/2017 (Exact Date)  BMI 18.91 kg/m2  BMI= Body mass index is 18.91 kg/(m^2).    General appearance=no deformities noted  Psych=mood is stable  Breast:  Benign exam, no masses palpated.  No skin changes, no axillary lymphadenopathy, no nipple discharge.  Axilla feel completely normal, no lymph node enlargement and non-tender.  Abd=incisions=healed well, suture trimmed from right lower quadrant      Assessment  1.)  S/p operative laparoscopy, left salpingectomy on 11/28/17 for ruptured ectopic pregnancy  2.)  Breast tenderness      Plan  1.)  Quant  2.)  Pelvic ultrasound  3.)  HSG      40 minutes was spent face to face with the patient today discussing her history, diagnosis, and follow-up plan as noted above.  Over 50% of the visit was spent in counseling and coordination of care.        Nursing notes read and reviewed    Tatianna Ch

## 2017-12-01 NOTE — TELEPHONE ENCOUNTER
Spoke with patient.  Better than she was.   Gas/bloating feeling is getting better.  Also has a migraine. States that's that she's not drinking enough water. Advised to increase the water.  Try things that relax her, like shower and rest, movies  If no improvement or worsening should be seen sooner   Bleeding has slowed and noticed when wiping.  Pain has been improving slowly.    Will continue home cares and return call if anything worsens of if she has any questions or concerns.    Westley Mcdonald, RN, BSN

## 2017-12-01 NOTE — TELEPHONE ENCOUNTER
Please let patient know it is likely related to the pregnancy.  Even though she had an ectopic pregnancy that resulted in surgery, it will still take time for her hormone levels to decrease.  She should follow up if it persists in the next week or so.    Tatianna Ch

## 2017-12-01 NOTE — TELEPHONE ENCOUNTER
Pt seen 11/27/17 by [facility] and she was told lump was nothing to worry about. Now that she is no longer pregnant she asked that you review and advise.   Lalitha Cortez is a 32 year old female who calls with lump in rt breast.    NURSING ASSESSMENT:  Description:  Pt called to report that she has a lump in her right breast.   Onset/duration:  5-6 weeks ago  Precip. factors:  1st noticed when she became pregnant and the size has been increasing quickly since she first noticed it  Associated symptoms:  No redness, no nipple changes or discharge  Pain: tender  LMP/preg/breast feeding:  10/14/17, surgery 11/29/17 for ectopic pregnancy   Last exam/Treatment:  OV with TONE Hopeil Day 11/27/17  Allergies:   Allergies   Allergen Reactions     Nkda [No Known Drug Allergies]      Seasonal Allergies        RECOMMENDED DISPOSITION:  Sent to provider to review  Will comply with recommendation: will wait for callback  If further questions/concerns or if symptoms do not improve, worsen or new symptoms develop, call your PCP or [facility] as soon as possible.    Guideline used: Breast pain and breast lump  Telephone Triage for Obstetrics and Gynecology, Chelsea Dan and Kenna Winkler RN

## 2017-12-01 NOTE — TELEPHONE ENCOUNTER
Please call patient and see how she is doing/feeling. She had surgery for an ectopic pregnancy on Tuesday.  Thanks.    Tatianna Ch

## 2017-12-07 ENCOUNTER — TELEPHONE (OUTPATIENT)
Dept: OBGYN | Facility: OTHER | Age: 32
End: 2017-12-07

## 2017-12-07 NOTE — TELEPHONE ENCOUNTER
I called and spoke to patient.  Patient has a quant scheduled for tomorrow.  She states she is doing well.  I will see her next week.  All questions answered and patient verbalizes understanding.    Tatianna Ch

## 2017-12-08 ENCOUNTER — TELEPHONE (OUTPATIENT)
Dept: OBGYN | Facility: OTHER | Age: 32
End: 2017-12-08

## 2017-12-08 DIAGNOSIS — O20.9 BLEEDING IN EARLY PREGNANCY: ICD-10-CM

## 2017-12-08 LAB — B-HCG SERPL-ACNC: 12 IU/L (ref 0–5)

## 2017-12-08 PROCEDURE — 36415 COLL VENOUS BLD VENIPUNCTURE: CPT | Performed by: FAMILY MEDICINE

## 2017-12-08 PROCEDURE — 84702 CHORIONIC GONADOTROPIN TEST: CPT | Performed by: FAMILY MEDICINE

## 2017-12-08 NOTE — TELEPHONE ENCOUNTER
Patient called back.  She states she has an appointment on Monday with Dr Arthur, but was wondering if she would be able to start trying to get pregnant again.  Informed her that she would need to discuss that with provider at her appointment.    Priyanka Barbour, FRANCIS  December 8, 2017

## 2017-12-11 ENCOUNTER — MYC MEDICAL ADVICE (OUTPATIENT)
Dept: OBGYN | Facility: OTHER | Age: 32
End: 2017-12-11

## 2017-12-11 ENCOUNTER — OFFICE VISIT (OUTPATIENT)
Dept: OBGYN | Facility: OTHER | Age: 32
End: 2017-12-11
Payer: COMMERCIAL

## 2017-12-11 VITALS
HEART RATE: 79 BPM | BODY MASS INDEX: 18.91 KG/M2 | DIASTOLIC BLOOD PRESSURE: 64 MMHG | WEIGHT: 111 LBS | SYSTOLIC BLOOD PRESSURE: 104 MMHG

## 2017-12-11 DIAGNOSIS — Z87.59 HISTORY OF ECTOPIC PREGNANCY: ICD-10-CM

## 2017-12-11 DIAGNOSIS — N91.2 ABSENCE OF MENSTRUATION: ICD-10-CM

## 2017-12-11 DIAGNOSIS — O00.102 LEFT TUBAL PREGNANCY WITHOUT INTRAUTERINE PREGNANCY: Primary | ICD-10-CM

## 2017-12-11 LAB — B-HCG SERPL-ACNC: 8 IU/L (ref 0–5)

## 2017-12-11 PROCEDURE — 99024 POSTOP FOLLOW-UP VISIT: CPT | Performed by: OBSTETRICS & GYNECOLOGY

## 2017-12-11 PROCEDURE — 84702 CHORIONIC GONADOTROPIN TEST: CPT | Performed by: OBSTETRICS & GYNECOLOGY

## 2017-12-11 PROCEDURE — 36415 COLL VENOUS BLD VENIPUNCTURE: CPT | Performed by: OBSTETRICS & GYNECOLOGY

## 2017-12-11 RX ORDER — CETIRIZINE HYDROCHLORIDE 10 MG/1
10 TABLET ORAL
COMMUNITY
End: 2018-04-17

## 2017-12-11 ASSESSMENT — PAIN SCALES - GENERAL: PAINLEVEL: MILD PAIN (2)

## 2017-12-11 NOTE — MR AVS SNAPSHOT
After Visit Summary   12/11/2017    Lalitha Cortez    MRN: [id number]           Patient Information     Date Of Birth          1985        Visit Information        Provider Department      12/11/2017 11:45 AM Tatianna Ch,  [facility]        Today's Diagnoses     Left tubal pregnancy without intrauterine pregnancy    -  1    Absence of menstruation        History of ectopic pregnancy          Care Instructions    Please call if you any questions.    [facility]  [address] [place]   [address]  [phone]        Tatianna Ch            Follow-ups after your visit        Follow-up notes from your care team     Return if symptoms worsen or fail to improve.      Future tests that were ordered for you today     Open Future Orders        Priority Expected Expires Ordered    US Pelvic Complete with Transvaginal Routine  3/11/2018 12/11/2017    XR Hysterosalpingogram Routine 12/11/2017 12/11/2018 12/11/2017            Who to contact     If you have questions or need follow up information about today's clinic visit or your schedule please contact [facility] directly at [phone].  Normal or non-critical lab and imaging results will be communicated to you by Zervehart, letter or phone within 4 business days after the clinic has received the results. If you do not hear from us within 7 days, please contact the clinic through Zervehart or phone. If you have a critical or abnormal lab result, we will notify you by phone as soon as possible.  Submit refill requests through Capstory or call your pharmacy and they will forward the refill request to us. Please allow 3 business days for your refill to be completed.          Additional Information About Your Visit        MyChart Information     Capstory gives you secure access to your electronic health record. If you see a primary care provider, you can also send messages to your care team and make  appointments. If you have questions, please call your primary care clinic.  If you do not have a primary care provider, please call [phone] and they will assist you.        Care EveryWhere ID     This is your Care EveryWhere ID. This could be used by other organizations to access your [place] medical records  [id number]        Your Vitals Were     Pulse Last Period BMI (Body Mass Index)             79 10/14/2017 (Exact Date) 18.91 kg/m2          Blood Pressure from Last 3 Encounters:   12/11/17 104/64   11/27/17 115/63   11/15/17 116/68    Weight from Last 3 Encounters:   12/11/17 111 lb (50.3 kg)   11/27/17 113 lb (51.3 kg)   11/15/17 112 lb 12.8 oz (51.2 kg)              We Performed the Following     HCG quantitative pregnancy        Primary Care Provider Office Phone # Fax #    Kristen M Kehr, PA-C [phone] [address] [facility] [address]        Equal Access to Services     [facility] : Hadii aad ku hadasho Soomaali, waaxda luqadaha, qaybta kaalmada adeegyada, waxmaya harvey haychristina akers . So [facility] [phone].    ATENCIÓN: Si habla español, tiene a heredia disposición servicios gratuitos de asistencia lingüística. Llame al [phone].    We comply with applicable federal civil rights laws and [place] laws. We do not discriminate on the basis of race, color, national origin, age, disability, sex, sexual orientation, or gender identity.            Thank you!     Thank you for choosing [facility]  for your care. Our goal is always to provide you with excellent care. Hearing back from our patients is one way we can continue to improve our services. Please take a few minutes to complete the written survey that you may receive in the mail after your visit with us. Thank you!             Your Updated Medication List - Protect others around you: Learn how to safely use, store and throw away your medicines at www.disposemymeds.org.          This list is  accurate as of: 12/11/17  2:08 PM.  Always use your most recent med list.                   Brand Name Dispense Instructions for use Diagnosis    cetirizine 10 MG tablet    zyrTEC     Take 10 mg by mouth        levothyroxine 50 MCG tablet    SYNTHROID    90 tablet    Take 1 tablet (50 mcg) by mouth daily    Hypothyroidism, unspecified type       prenatal multivitamin plus iron 27-0.8 MG Tabs per tablet      Take 1 tablet by mouth daily

## 2017-12-11 NOTE — NURSING NOTE
"Chief Complaint   Patient presents with     Surgical Followup     Post up FU -  Missed AB       Initial /64  Pulse 79  Wt 111 lb (50.3 kg)  LMP 10/14/2017 (Exact Date)  BMI 18.91 kg/m2 Estimated body mass index is 18.91 kg/(m^2) as calculated from the following:    Height as of 11/27/17: 5' 4.25\" (1.632 m).    Weight as of this encounter: 111 lb (50.3 kg).  Medication Reconciliation: complete  "

## 2017-12-12 DIAGNOSIS — O00.90 RUPTURED ECTOPIC PREGNANCY: Primary | ICD-10-CM

## 2017-12-18 DIAGNOSIS — O00.90 RUPTURED ECTOPIC PREGNANCY: ICD-10-CM

## 2017-12-18 LAB — B-HCG SERPL-ACNC: 7 IU/L (ref 0–5)

## 2017-12-18 PROCEDURE — 84702 CHORIONIC GONADOTROPIN TEST: CPT | Performed by: OBSTETRICS & GYNECOLOGY

## 2017-12-18 PROCEDURE — 36415 COLL VENOUS BLD VENIPUNCTURE: CPT | Performed by: OBSTETRICS & GYNECOLOGY

## 2017-12-19 ENCOUNTER — MYC MEDICAL ADVICE (OUTPATIENT)
Dept: OBGYN | Facility: OTHER | Age: 32
End: 2017-12-19

## 2017-12-19 ENCOUNTER — RADIANT APPOINTMENT (OUTPATIENT)
Dept: ULTRASOUND IMAGING | Facility: CLINIC | Age: 32
End: 2017-12-19
Attending: OBSTETRICS & GYNECOLOGY
Payer: COMMERCIAL

## 2017-12-19 DIAGNOSIS — N91.2 ABSENCE OF MENSTRUATION: ICD-10-CM

## 2017-12-19 PROCEDURE — 76856 US EXAM PELVIC COMPLETE: CPT | Performed by: STUDENT IN AN ORGANIZED HEALTH CARE EDUCATION/TRAINING PROGRAM

## 2017-12-19 PROCEDURE — 76830 TRANSVAGINAL US NON-OB: CPT | Performed by: STUDENT IN AN ORGANIZED HEALTH CARE EDUCATION/TRAINING PROGRAM

## 2018-01-02 ENCOUNTER — MYC MEDICAL ADVICE (OUTPATIENT)
Dept: OBGYN | Facility: OTHER | Age: 33
End: 2018-01-02

## 2018-01-02 DIAGNOSIS — O00.90 RUPTURED ECTOPIC PREGNANCY: ICD-10-CM

## 2018-01-02 LAB — B-HCG SERPL-ACNC: 2 IU/L (ref 0–5)

## 2018-01-02 PROCEDURE — 36415 COLL VENOUS BLD VENIPUNCTURE: CPT | Performed by: OBSTETRICS & GYNECOLOGY

## 2018-01-02 PROCEDURE — 84702 CHORIONIC GONADOTROPIN TEST: CPT | Performed by: OBSTETRICS & GYNECOLOGY

## 2018-01-02 NOTE — TELEPHONE ENCOUNTER
Contacted the radiology department in [place] they sated she should be fine to go ahead with the HSG procedure this Friday as long as she was not bleeding heavily.  I contacted the patient back and notified her of this.  She was also instructed to contact the radiology department by thisThursday to change her appointment if needed and at that time radiology should have the schedules for the MD that will be in next week.

## 2018-02-05 ENCOUNTER — TELEPHONE (OUTPATIENT)
Dept: OBGYN | Facility: CLINIC | Age: 33
End: 2018-02-05

## 2018-02-05 DIAGNOSIS — N91.2 ABSENCE OF MENSTRUATION: Primary | ICD-10-CM

## 2018-02-05 DIAGNOSIS — O00.90 RUPTURED ECTOPIC PREGNANCY: ICD-10-CM

## 2018-02-05 LAB — B-HCG SERPL-ACNC: <1 IU/L (ref 0–5)

## 2018-02-05 PROCEDURE — 36415 COLL VENOUS BLD VENIPUNCTURE: CPT | Performed by: OBSTETRICS & GYNECOLOGY

## 2018-02-05 PROCEDURE — 84702 CHORIONIC GONADOTROPIN TEST: CPT | Performed by: OBSTETRICS & GYNECOLOGY

## 2018-02-05 NOTE — TELEPHONE ENCOUNTER
"Phone call from patient. She reported having  Laparoscopic L salpingectomy, evacuation of hemoperitoneum   on 11-28-17. She reported period prior to surgery was 10-14-17 and only period since surgery was 01-01-18. Patient verbalized concerns about severe constant, deep, intense cramps since about 01-15-18. Patient stated cramps were mostly on L side in the beginning but continues to have cramps and \"now more towards the back.\" Patient reports pain 6-7/10 does not decrease. Patient stated pain as mostly on the L side so thought she ovulated on L side last month. She wonder if it is nl to have her period be so late especially if she is ovulating from the right side this month. She stated her period is 6 days late. Two negative home UPTs on 01-26-18 & 02-01-18. Reports bloating both sides. She stated she is able to do her job so pain is not deabilitating but wonders why the pain and delay in her cycle. This RN recommended an office visit to discuss and be assessed. Patient declined for financial reasons unless absolutely necessary. Explained will get a message to Dr. Ch and staff will call back with orders. Danii Bond RN, BAN     "

## 2018-02-05 NOTE — TELEPHONE ENCOUNTER
I called and had a lengthly discussion with patient.  I recommended a repeat ultrasound if patient continues to have pelvic pain.  I also offered her a quant and she may come in for that today.  She feels her bowels are no different then normal.  She will keep me updated.     Tatianna Ch

## 2018-02-06 ENCOUNTER — MYC MEDICAL ADVICE (OUTPATIENT)
Dept: OBGYN | Facility: OTHER | Age: 33
End: 2018-02-06

## 2018-02-06 ENCOUNTER — RADIANT APPOINTMENT (OUTPATIENT)
Dept: ULTRASOUND IMAGING | Facility: CLINIC | Age: 33
End: 2018-02-06
Attending: OBSTETRICS & GYNECOLOGY
Payer: COMMERCIAL

## 2018-02-06 DIAGNOSIS — R10.2 PELVIC PAIN IN FEMALE: Primary | ICD-10-CM

## 2018-02-06 DIAGNOSIS — R10.2 PELVIC PAIN IN FEMALE: ICD-10-CM

## 2018-02-06 PROCEDURE — 76830 TRANSVAGINAL US NON-OB: CPT | Performed by: STUDENT IN AN ORGANIZED HEALTH CARE EDUCATION/TRAINING PROGRAM

## 2018-02-06 PROCEDURE — 76856 US EXAM PELVIC COMPLETE: CPT | Performed by: STUDENT IN AN ORGANIZED HEALTH CARE EDUCATION/TRAINING PROGRAM

## 2018-02-08 ENCOUNTER — MYC MEDICAL ADVICE (OUTPATIENT)
Dept: OBGYN | Facility: OTHER | Age: 33
End: 2018-02-08

## 2018-02-08 NOTE — TELEPHONE ENCOUNTER
I called and spoke to patient about her ultrasound results.  All questions answered and patient verbalizes understanding.    Tatianna Ch

## 2018-03-16 DIAGNOSIS — Z87.59 HISTORY OF ECTOPIC PREGNANCY: Primary | ICD-10-CM

## 2018-03-16 DIAGNOSIS — Z87.59 HISTORY OF ECTOPIC PREGNANCY: ICD-10-CM

## 2018-03-16 LAB — B-HCG SERPL-ACNC: 153 IU/L (ref 0–5)

## 2018-03-16 PROCEDURE — 84702 CHORIONIC GONADOTROPIN TEST: CPT | Performed by: OBSTETRICS & GYNECOLOGY

## 2018-03-16 PROCEDURE — 36415 COLL VENOUS BLD VENIPUNCTURE: CPT | Performed by: OBSTETRICS & GYNECOLOGY

## 2018-03-16 NOTE — NURSING NOTE
"Chief Complaint   Patient presents with     Pelvic Pain     LLQ pain x 5 days     Back Pain     low x 6 days     Vaginal Bleeding     Breast Problem     Pain/ lumps bilateral x 1 week       Initial /63  Pulse 83  Temp 97.7  F (36.5  C) (Oral)  Ht 5' 4.25\" (1.632 m)  Wt 113 lb (51.3 kg)  LMP 10/14/2017 (Exact Date)  BMI 19.25 kg/m2 Estimated body mass index is 19.25 kg/(m^2) as calculated from the following:    Height as of this encounter: 5' 4.25\" (1.632 m).    Weight as of this encounter: 113 lb (51.3 kg)..  BP completed using cuff size: jensen Singh CMA    " No

## 2018-03-19 ENCOUNTER — MYC MEDICAL ADVICE (OUTPATIENT)
Dept: OBGYN | Facility: OTHER | Age: 33
End: 2018-03-19

## 2018-03-19 DIAGNOSIS — O09.11 PREGNANCY WITH HISTORY OF ECTOPIC PREGNANCY, ANTEPARTUM, FIRST TRIMESTER: Primary | ICD-10-CM

## 2018-03-19 DIAGNOSIS — N91.2 ABSENCE OF MENSTRUATION: ICD-10-CM

## 2018-03-19 LAB — B-HCG SERPL-ACNC: 507 IU/L (ref 0–5)

## 2018-03-19 PROCEDURE — 84702 CHORIONIC GONADOTROPIN TEST: CPT | Performed by: OBSTETRICS & GYNECOLOGY

## 2018-03-19 PROCEDURE — 36415 COLL VENOUS BLD VENIPUNCTURE: CPT | Performed by: OBSTETRICS & GYNECOLOGY

## 2018-03-21 ENCOUNTER — MYC MEDICAL ADVICE (OUTPATIENT)
Dept: OBGYN | Facility: CLINIC | Age: 33
End: 2018-03-21

## 2018-03-21 DIAGNOSIS — Z87.59 HISTORY OF ECTOPIC PREGNANCY: ICD-10-CM

## 2018-03-21 LAB — B-HCG SERPL-ACNC: 1352 IU/L (ref 0–5)

## 2018-03-21 PROCEDURE — 84702 CHORIONIC GONADOTROPIN TEST: CPT | Performed by: OBSTETRICS & GYNECOLOGY

## 2018-03-21 PROCEDURE — 36415 COLL VENOUS BLD VENIPUNCTURE: CPT | Performed by: OBSTETRICS & GYNECOLOGY

## 2018-03-22 ENCOUNTER — MYC MEDICAL ADVICE (OUTPATIENT)
Dept: OBGYN | Facility: CLINIC | Age: 33
End: 2018-03-22

## 2018-03-22 DIAGNOSIS — O09.11 PREGNANCY WITH HISTORY OF ECTOPIC PREGNANCY, ANTEPARTUM, FIRST TRIMESTER: Primary | ICD-10-CM

## 2018-03-22 NOTE — TELEPHONE ENCOUNTER
Responded via [employer] using 1st Trimester Vaginal Discharge protocol. Erin Leigh RN, BSN

## 2018-03-23 ENCOUNTER — RADIANT APPOINTMENT (OUTPATIENT)
Dept: ULTRASOUND IMAGING | Facility: CLINIC | Age: 33
End: 2018-03-23
Attending: OBSTETRICS & GYNECOLOGY
Payer: COMMERCIAL

## 2018-03-23 ENCOUNTER — TELEPHONE (OUTPATIENT)
Dept: FAMILY MEDICINE | Facility: OTHER | Age: 33
End: 2018-03-23

## 2018-03-23 DIAGNOSIS — O09.11 PREGNANCY WITH HISTORY OF ECTOPIC PREGNANCY, ANTEPARTUM, FIRST TRIMESTER: ICD-10-CM

## 2018-03-23 DIAGNOSIS — O00.90 RUPTURED ECTOPIC PREGNANCY: ICD-10-CM

## 2018-03-23 LAB — B-HCG SERPL-ACNC: 3696 IU/L (ref 0–5)

## 2018-03-23 PROCEDURE — 84702 CHORIONIC GONADOTROPIN TEST: CPT | Performed by: OBSTETRICS & GYNECOLOGY

## 2018-03-23 PROCEDURE — 36415 COLL VENOUS BLD VENIPUNCTURE: CPT | Performed by: OBSTETRICS & GYNECOLOGY

## 2018-03-23 PROCEDURE — 76817 TRANSVAGINAL US OBSTETRIC: CPT | Performed by: RADIOLOGY

## 2018-03-23 PROCEDURE — 76801 OB US < 14 WKS SINGLE FETUS: CPT | Performed by: RADIOLOGY

## 2018-03-23 NOTE — TELEPHONE ENCOUNTER
US ordered. Please have patient come in today.    Also let her know that her pregnancy hormone level is normal.

## 2018-03-23 NOTE — TELEPHONE ENCOUNTER
RN contacted [facility] and clarified waiting on provider pool to review and advise on HCG level and if able to complete US for reassurance due to history or ectopic pregnancy and is newly pregnancy having worsening cramps. Erin Leigh RN, BSN

## 2018-03-23 NOTE — TELEPHONE ENCOUNTER
Patient called and informed of MMs note. Gave MG imaging number to call and schedule -[phone]. Patient stated she is very appreciative and in agreement with plan. Erin Leigh RN, BSN

## 2018-03-23 NOTE — TELEPHONE ENCOUNTER
Responded via [employer]. Erin Leigh RN, BSN   Provider to review and advise on HCG Quant levels completed today. Please review and advise if Ultrasound would be an option to provide patient with reassurance at this time. Erin Leigh RN, BSN

## 2018-03-23 NOTE — TELEPHONE ENCOUNTER
Routing to [facility] to assist with scheduling Ultrasound today. Patient is aware of labs. Erin Leigh, RN, BSN

## 2018-03-23 NOTE — TELEPHONE ENCOUNTER
RN contacted [facility] and clarified waiting on provider pool to review and advise on HCG level and if able to complete US for reassurance due to history or ectopic pregnancy and is newly pregnancy having worsening cramps. Please see other encounter dated 03/22/2018 for additional information. Erin Leigh RN, BSN

## 2018-03-23 NOTE — TELEPHONE ENCOUNTER
Please call  regarding this patient's order at [facility]. They have questions on this, please call or fix the order, if able.   Thank you!

## 2018-03-23 NOTE — TELEPHONE ENCOUNTER
Responded via [employer]. Erin Leigh RN, BSN   Provider to review and advise on HCG Quant levels completed today. Please review and advise if Ultrasound would be an option to provide patient with reassurance at this time. Erin Leigh RN, BSN

## 2018-03-29 ENCOUNTER — RADIANT APPOINTMENT (OUTPATIENT)
Dept: ULTRASOUND IMAGING | Facility: CLINIC | Age: 33
End: 2018-03-29
Attending: OBSTETRICS & GYNECOLOGY
Payer: COMMERCIAL

## 2018-03-29 DIAGNOSIS — O09.11 PREGNANCY WITH HISTORY OF ECTOPIC PREGNANCY, ANTEPARTUM, FIRST TRIMESTER: ICD-10-CM

## 2018-03-29 PROCEDURE — 76801 OB US < 14 WKS SINGLE FETUS: CPT | Performed by: STUDENT IN AN ORGANIZED HEALTH CARE EDUCATION/TRAINING PROGRAM

## 2018-03-29 PROCEDURE — 76817 TRANSVAGINAL US OBSTETRIC: CPT | Performed by: STUDENT IN AN ORGANIZED HEALTH CARE EDUCATION/TRAINING PROGRAM

## 2018-04-11 ENCOUNTER — MYC MEDICAL ADVICE (OUTPATIENT)
Dept: OBGYN | Facility: OTHER | Age: 33
End: 2018-04-11

## 2018-04-11 ENCOUNTER — TELEPHONE (OUTPATIENT)
Dept: OBGYN | Facility: CLINIC | Age: 33
End: 2018-04-11

## 2018-04-11 NOTE — TELEPHONE ENCOUNTER
RN called patient. Informed she just got off the phone with Shelia BARROS form . Denies vaginal bleeding, vaginal irritation/burning/itching/odor, fevers, body aches, chills/sweats, severe pain. Informed the patient would connect with Gracy BARROS. RN spoke with MG FIORELLA Bass, discussed possibility of abnormal discharge or mucous plug. Shelia BARROS will reach out to covering providers to review and advise. Will close this encounter. Erin Leigh RN, BSN

## 2018-04-11 NOTE — TELEPHONE ENCOUNTER
7 w 6d  At work. Went to bathroom, 3 white masses of solid, blueberry in size, not stringy, no bleeding, Has her normal pregnancy symptoms. Has had discharge creamy like, denies vaginal itching and odor. No fever.  Patient would like to be seen.  Made an appointment with Dr Duron at 2:00 4-  Gracy Jaimes RN

## 2018-04-12 ENCOUNTER — PRENATAL OFFICE VISIT (OUTPATIENT)
Dept: OBGYN | Facility: OTHER | Age: 33
End: 2018-04-12
Payer: COMMERCIAL

## 2018-04-12 ENCOUNTER — MYC MEDICAL ADVICE (OUTPATIENT)
Dept: OBGYN | Facility: OTHER | Age: 33
End: 2018-04-12

## 2018-04-12 VITALS
BODY MASS INDEX: 19.72 KG/M2 | HEART RATE: 68 BPM | HEIGHT: 64 IN | DIASTOLIC BLOOD PRESSURE: 64 MMHG | SYSTOLIC BLOOD PRESSURE: 100 MMHG | WEIGHT: 115.5 LBS

## 2018-04-12 DIAGNOSIS — N89.8 VAGINAL DISCHARGE: Primary | ICD-10-CM

## 2018-04-12 DIAGNOSIS — E03.9 HYPOTHYROIDISM, UNSPECIFIED TYPE: ICD-10-CM

## 2018-04-12 DIAGNOSIS — Z34.81 ENCOUNTER FOR SUPERVISION OF OTHER NORMAL PREGNANCY IN FIRST TRIMESTER: ICD-10-CM

## 2018-04-12 LAB
ABO + RH BLD: NORMAL
ABO + RH BLD: NORMAL
ALBUMIN UR-MCNC: NEGATIVE MG/DL
APPEARANCE UR: CLEAR
BILIRUB UR QL STRIP: NEGATIVE
BLD GP AB SCN SERPL QL: NORMAL
BLOOD BANK CMNT PATIENT-IMP: NORMAL
COLOR UR AUTO: YELLOW
ERYTHROCYTE [DISTWIDTH] IN BLOOD BY AUTOMATED COUNT: 11.7 % (ref 10–15)
GLUCOSE UR STRIP-MCNC: NEGATIVE MG/DL
HCT VFR BLD AUTO: 39.9 % (ref 35–47)
HGB BLD-MCNC: 13.4 G/DL (ref 11.7–15.7)
HGB UR QL STRIP: NEGATIVE
KETONES UR STRIP-MCNC: NEGATIVE MG/DL
LEUKOCYTE ESTERASE UR QL STRIP: NEGATIVE
MCH RBC QN AUTO: 29.3 PG (ref 26.5–33)
MCHC RBC AUTO-ENTMCNC: 33.6 G/DL (ref 31.5–36.5)
MCV RBC AUTO: 87 FL (ref 78–100)
NITRATE UR QL: NEGATIVE
PH UR STRIP: 5 PH (ref 5–7)
PLATELET # BLD AUTO: 249 10E9/L (ref 150–450)
RBC # BLD AUTO: 4.58 10E12/L (ref 3.8–5.2)
SOURCE: NORMAL
SP GR UR STRIP: >1.03 (ref 1–1.03)
SPECIMEN EXP DATE BLD: NORMAL
SPECIMEN SOURCE: NORMAL
TSH SERPL DL<=0.005 MIU/L-ACNC: 2.74 MU/L (ref 0.4–4)
UROBILINOGEN UR STRIP-ACNC: 0.2 EU/DL (ref 0.2–1)
WBC # BLD AUTO: 9.5 10E9/L (ref 4–11)
WET PREP SPEC: NORMAL

## 2018-04-12 PROCEDURE — 99213 OFFICE O/P EST LOW 20 MIN: CPT | Performed by: OBSTETRICS & GYNECOLOGY

## 2018-04-12 PROCEDURE — 86901 BLOOD TYPING SEROLOGIC RH(D): CPT | Performed by: OBSTETRICS & GYNECOLOGY

## 2018-04-12 PROCEDURE — 87340 HEPATITIS B SURFACE AG IA: CPT | Performed by: OBSTETRICS & GYNECOLOGY

## 2018-04-12 PROCEDURE — 86900 BLOOD TYPING SEROLOGIC ABO: CPT | Performed by: OBSTETRICS & GYNECOLOGY

## 2018-04-12 PROCEDURE — 84443 ASSAY THYROID STIM HORMONE: CPT | Performed by: OBSTETRICS & GYNECOLOGY

## 2018-04-12 PROCEDURE — 36415 COLL VENOUS BLD VENIPUNCTURE: CPT | Performed by: OBSTETRICS & GYNECOLOGY

## 2018-04-12 PROCEDURE — 85027 COMPLETE CBC AUTOMATED: CPT | Performed by: OBSTETRICS & GYNECOLOGY

## 2018-04-12 PROCEDURE — 87086 URINE CULTURE/COLONY COUNT: CPT | Performed by: OBSTETRICS & GYNECOLOGY

## 2018-04-12 PROCEDURE — 87210 SMEAR WET MOUNT SALINE/INK: CPT | Performed by: OBSTETRICS & GYNECOLOGY

## 2018-04-12 PROCEDURE — 81003 URINALYSIS AUTO W/O SCOPE: CPT | Performed by: OBSTETRICS & GYNECOLOGY

## 2018-04-12 PROCEDURE — 87389 HIV-1 AG W/HIV-1&-2 AB AG IA: CPT | Performed by: OBSTETRICS & GYNECOLOGY

## 2018-04-12 PROCEDURE — 86762 RUBELLA ANTIBODY: CPT | Performed by: OBSTETRICS & GYNECOLOGY

## 2018-04-12 PROCEDURE — 86780 TREPONEMA PALLIDUM: CPT | Performed by: OBSTETRICS & GYNECOLOGY

## 2018-04-12 PROCEDURE — 86850 RBC ANTIBODY SCREEN: CPT | Performed by: OBSTETRICS & GYNECOLOGY

## 2018-04-12 NOTE — NURSING NOTE
"Chief Complaint   Patient presents with     Vaginal Problem     white clumps in toilet       Initial /64 (BP Location: Right arm, Patient Position: Chair, Cuff Size: Adult Regular)  Pulse 68  Ht 5' 3.98\" (1.625 m)  Wt 115 lb 8 oz (52.4 kg)  LMP 10/14/2017 (Exact Date)  BMI 19.84 kg/m2 Estimated body mass index is 19.84 kg/(m^2) as calculated from the following:    Height as of this encounter: 5' 3.98\" (1.625 m).    Weight as of this encounter: 115 lb 8 oz (52.4 kg).  Medication Reconciliation: complete     Priyanka Barbour, [facility]  April 12, 2018      "

## 2018-04-12 NOTE — PROGRESS NOTES
OB/GYN  2018      NAME:  Lalitha Cortez  PCP:  KehrKina  MRN:  [id number]    Impression / Plan     32 year old  at 8w0d with:      ICD-10-CM    1. Vaginal discharge N89.8 Wet prep   2. Encounter for supervision of other normal pregnancy in first trimester Z34.81 Hepatitis B surface antigen     Rubella Antibody IgG Quantitative     Anti Treponema     ABO/Rh type and screen     HIV Antigen Antibody Combo     Urine Culture Aerobic Bacterial     *UA reflex to Microscopic     CBC with platelets   3. Hypothyroidism, unspecified type E03.9 TSH with free T4 reflex       Discussed exam findings. I am not sure what the white marble-like discharge was from, but no signs of infection or abnormality today.  Reassurance given.     She will follow up for first OB apt with Dr. Ch next week (pelvic exam done, labs ordered, and NINA confirmed today).      Chief Complaint     Chief Complaint   Patient presents with     Vaginal Problem     white clumps in toilet       HPI     Lalitha Cortez is a  32 year old female who is seen for vaginal discharge.  She passes at least 3 white solid masses when she went to the bathroom yesterday.     No vaginal bleeding, no vulvar itching or burning. No significant pelvic pain or cramping.      Patient's last menstrual period was 2018.     Date of Last Pap Smear:   Lab Results   Component Value Date    PAP NIL 2016       Problem List     Patient Active Problem List    Diagnosis Date Noted     S/P ectopic pregnancy 2017     Priority: Medium     Hypothyroidism 2016     Priority: Medium     Conjunctivitis, allergic 2012     Priority: Medium     Dry eyes 2012     Priority: Medium     Blepharitis 2012     Priority: Medium     Seasonal allergic rhinitis 2011     Priority: Medium     Acne 2011     Priority: Medium     Treated by Dermatology         CARDIOVASCULAR SCREENING; LDL GOAL LESS THAN 160 10/31/2010     Priority: Medium  "      Medications     Current Outpatient Prescriptions   Medication     Prenatal Vit-Fe Fumarate-FA (PRENATAL MULTIVITAMIN PLUS IRON) 27-0.8 MG TABS per tablet     levothyroxine (SYNTHROID) 50 MCG tablet     cetirizine (ZYRTEC) 10 MG tablet     No current facility-administered medications for this visit.         Allergies     Allergies   Allergen Reactions     Nkda [No Known Drug Allergies]      Seasonal Allergies        ROS     A 10 organ review of systems was asked and the pertinent positives and negatives are listed in the HPI. All other organ systems can be considered negative.     Physical Exam   Vitals: /64 (BP Location: Right arm, Patient Position: Chair, Cuff Size: Adult Regular)  Pulse 68  Ht 5' 3.98\" (1.625 m)  Wt 115 lb 8 oz (52.4 kg)  LMP 02/09/2018  BMI 19.84 kg/m2    General: Comfortable, no obvious distress, normal  body habitus  Psych: Alert and orientated x 3. Appropriate affect, good insight.   : Normal female external genitalia.  No lesions.  Urethral meatus normal.  Speculum exam reveals a normal vaginal vault, normal cervix. Cervix closed.  Moderate amount of white creamy discharge.  No clumps.  No erythema.   Bimanual exam reveals a normal, mobile, nontender uterus. Size consistent with dates.  No cervical motion tenderness.  Adnexa nontender with no palpable masses.     Extremities: No peripheral edema, nontender         Labs/Imaging       Labs were reviewed in Psychiatric     Imaging was reviewed in Epic.       20 minutes was spent with patient, more than 50% counseling and coordinating care      Kelin Duron MD     "

## 2018-04-12 NOTE — TELEPHONE ENCOUNTER
Entered by Kelin Duron MD at 4/12/2018  2:43 PM   Read by Lalitha Cortez at 4/12/2018  3:31 PM   Hi!     Your wet prep is negative (normal).  Please let me know if you have any questions or concerns.      Will close encounter. Danii Bond RN, BAN

## 2018-04-12 NOTE — MR AVS SNAPSHOT
After Visit Summary   4/12/2018    Lalitha Cortez    MRN: [id number]           Patient Information     Date Of Birth          1985        Visit Information        Provider Department      4/12/2018 2:00 PM Kelin Duron MD [facility]        Today's Diagnoses     Vaginal discharge    -  1    Encounter for supervision of other normal pregnancy in first trimester        Hypothyroidism, unspecified type           Follow-ups after your visit        Follow-up notes from your care team     Return in about 1 week (around 4/19/2018) for OB Visit.      Your next 10 appointments already scheduled     Apr 17, 2018 11:45 AM CDT   New Prenatal with Tatianna Ch, DO   [facility] [place]    [id number] [address] [place] [id number] [phone]              Who to contact     If you have questions or need follow up information about today's clinic visit or your schedule please contact [facility] directly at [phone].  Normal or non-critical lab and imaging results will be communicated to you by Danlanhart, letter or phone within 4 business days after the clinic has received the results. If you do not hear from us within 7 days, please contact the clinic through IDInteractt or phone. If you have a critical or abnormal lab result, we will notify you by phone as soon as possible.  Submit refill requests through Veebox or call your pharmacy and they will forward the refill request to us. Please allow 3 business days for your refill to be completed.          Additional Information About Your Visit        Danlanhart Information     Veebox gives you secure access to your electronic health record. If you see a primary care provider, you can also send messages to your care team and make appointments. If you have questions, please call your primary care clinic.  If you do not have a primary care provider, please call [phone] and  "they will assist you.        Care EveryWhere ID     This is your Care EveryWhere ID. This could be used by other organizations to access your [place] medical records  [id number]        Your Vitals Were     Pulse Height Last Period BMI (Body Mass Index)          68 5' 3.98\" (1.625 m) 02/09/2018 19.84 kg/m2         Blood Pressure from Last 3 Encounters:   04/12/18 100/64   12/11/17 104/64   11/27/17 115/63    Weight from Last 3 Encounters:   04/12/18 115 lb 8 oz (52.4 kg)   12/11/17 111 lb (50.3 kg)   11/27/17 113 lb (51.3 kg)              We Performed the Following     *UA reflex to Microscopic     ABO/Rh type and screen     Anti Treponema     CBC with platelets     Hepatitis B surface antigen     HIV Antigen Antibody Combo     Rubella Antibody IgG Quantitative     TSH with free T4 reflex     Urine Culture Aerobic Bacterial     Wet prep        Primary Care Provider Office Phone # Fax #    Kristen M Kehr, PA-C [phone] [address] [facility] [address]        Equal Access to Services     [facility]: Hadii aad ku hadasho Soomaali, waaxda luqadaha, qaybta kaalmada ademonalisa, rigoberto akers . So [facility] [phone].    ATENCIÓN: Si habla español, tiene a heredia disposición servicios gratuitos de asistencia lingüística. Nichelle al [phone].    We comply with applicable federal civil rights laws and [place] laws. We do not discriminate on the basis of race, color, national origin, age, disability, sex, sexual orientation, or gender identity.            Thank you!     Thank you for choosing [facility]  for your care. Our goal is always to provide you with excellent care. Hearing back from our patients is one way we can continue to improve our services. Please take a few minutes to complete the written survey that you may receive in the mail after your visit with us. Thank you!             Your Updated Medication List - Protect others around you: Learn " how to safely use, store and throw away your medicines at www.disposemymeds.org.          This list is accurate as of 4/12/18 11:59 PM.  Always use your most recent med list.                   Brand Name Dispense Instructions for use Diagnosis    cetirizine 10 MG tablet    zyrTEC     Take 10 mg by mouth        levothyroxine 50 MCG tablet    SYNTHROID    90 tablet    Take 1 tablet (50 mcg) by mouth daily    Hypothyroidism, unspecified type       prenatal multivitamin plus iron 27-0.8 MG Tabs per tablet      Take 1 tablet by mouth daily

## 2018-04-13 ENCOUNTER — MYC MEDICAL ADVICE (OUTPATIENT)
Dept: OBGYN | Facility: OTHER | Age: 33
End: 2018-04-13

## 2018-04-13 LAB
BACTERIA SPEC CULT: NO GROWTH
HBV SURFACE AG SERPL QL IA: NONREACTIVE
HIV 1+2 AB+HIV1 P24 AG SERPL QL IA: NONREACTIVE
Lab: NORMAL
RUBV IGG SERPL IA-ACNC: 39 IU/ML
SPECIMEN SOURCE: NORMAL
T PALLIDUM IGG+IGM SER QL: NEGATIVE

## 2018-04-17 ENCOUNTER — PRENATAL OFFICE VISIT (OUTPATIENT)
Dept: OBGYN | Facility: CLINIC | Age: 33
End: 2018-04-17
Payer: COMMERCIAL

## 2018-04-17 VITALS
DIASTOLIC BLOOD PRESSURE: 70 MMHG | WEIGHT: 114 LBS | SYSTOLIC BLOOD PRESSURE: 111 MMHG | HEART RATE: 79 BPM | BODY MASS INDEX: 19.58 KG/M2

## 2018-04-17 DIAGNOSIS — Z23 NEED FOR TDAP VACCINATION: ICD-10-CM

## 2018-04-17 DIAGNOSIS — O41.8X10 SUBCHORIONIC HEMATOMA IN FIRST TRIMESTER, SINGLE OR UNSPECIFIED FETUS: ICD-10-CM

## 2018-04-17 DIAGNOSIS — O46.8X1 SUBCHORIONIC HEMATOMA IN FIRST TRIMESTER, SINGLE OR UNSPECIFIED FETUS: ICD-10-CM

## 2018-04-17 DIAGNOSIS — Z34.91 NORMAL PREGNANCY IN FIRST TRIMESTER: Primary | ICD-10-CM

## 2018-04-17 DIAGNOSIS — E03.9 HYPOTHYROIDISM, UNSPECIFIED TYPE: ICD-10-CM

## 2018-04-17 PROCEDURE — 99207 ZZC FIRST OB VISIT: CPT | Performed by: OBSTETRICS & GYNECOLOGY

## 2018-04-17 ASSESSMENT — PAIN SCALES - GENERAL: PAINLEVEL: NO PAIN (0)

## 2018-04-17 NOTE — NURSING NOTE
"Chief Complaint   Patient presents with     Prenatal Care     New Prenatal-  Pap is not due       Initial /70  Pulse 79  Wt 114 lb (51.7 kg)  LMP 02/09/2018  BMI 19.58 kg/m2 Estimated body mass index is 19.58 kg/(m^2) as calculated from the following:    Height as of 4/12/18: 5' 3.98\" (1.625 m).    Weight as of this encounter: 114 lb (51.7 kg).  Medication Reconciliation: complete         "

## 2018-04-17 NOTE — MR AVS SNAPSHOT
After Visit Summary   4/17/2018    Lalitha Cortez    MRN: [id number]           Patient Information     Date Of Birth          1985        Visit Information        Provider Department      4/17/2018 11:45 AM Tatianna Ch DO [facility] [place]        Today's Diagnoses     Normal pregnancy in first trimester    -  1    Subchorionic hematoma in first trimester, single or unspecified fetus        Hypothyroidism, unspecified type        Need for Tdap vaccination          Care Instructions    Prenatal Care  What is prenatal care?   Prenatal care is the care you receive when you are pregnant. It includes care given by your healthcare provider, support from your family, and an extra focus on giving yourself the care you need during this special time. Prenatal care improves your chances for a healthy pregnancy and healthy baby.   When should I see my healthcare provider?   Good care during pregnancy includes regularly scheduled prenatal exams.   If you are not yet pregnant but planning to get pregnant in the next few months, see your provider. Your provider may do some tests and talk about things you can do to have a healthy pregnancy and healthy baby.   You should schedule your first prenatal visit with your provider as soon as you think or know you are pregnant. Depending on your health and health history, your provider will probably schedule visits at least once a month for the first 6 months. During the 7th and 8th months you will see your provider every 2 weeks. During the last month you will probably see your provider once a week until you deliver your baby. If your pregnancy is high risk, your provider will probably want to see you more often. In some cases your provider may refer you to a medical specialist for more help with special needs, such as diabetes.   At each visit your healthcare provider will check to make sure that you and the baby are healthy. Regular visits can help  you and your provider prevent possible problems. They can also help your provider find and treat any problems early. In addition to meeting your medical needs, your provider advise you about caring for yourself. You will talk about how to have a healthy diet and get plenty of exercise and rest. Your provider can also help you deal with the emotional changes that can happen during pregnancy.   What will happen at the first prenatal visit?   At your first visit, your provider will ask about your personal medical history. He or she will also ask about the baby's father and your family health history. This information can help give your provider an idea of any problems you might have during your pregnancy. You will have a physical exam, including checks of your height, weight, and blood pressure and a pelvic exam. You will have a Pap test, urine tests, blood tests, and cultures of the cervix and vagina to check for infection.   Your provider will calculate your due date and the age of your baby. If your periods were regular before you got pregnant, and you are sure of the day when your last period started, your due date will be estimated to be 40 weeks from that day.   Your provider will talk to you about how to stay healthy during your pregnancy.   What will happen at other prenatal visits?   Your provider will check how you are doing and how the baby is developing. He or she will discuss how you are feeling, ask if you have any problems, and answer your questions.   During each prenatal visit your provider will:   weigh you   take your blood pressure   check your urine for sugar, protein, or bacteria   check your face, hands, ankles, and feet for swelling   listen to the baby's heartbeat   measure the size of the uterus to check the baby's growth.   At different times during the pregnancy, other exams and tests may be done. Some are routine and others are done only when a problem is suspected or you have a risk factor  for a problem. Examples of other tests you might have are:   tests to check for genetic problems and some birth defects, such as:   chorionic villus sampling of cells from the placenta   amniocentesis to test the fluid around the baby   blood tests called triple or quad screens   ultrasound scans to check the baby's growth, development, and health and to look at your uterus, the amniotic sac, and the placenta   blood tests to check for diabetes   electronic monitoring to check the health of the baby.   How can I take care of myself during my pregnancy?   Here are some things you can do to take good care of yourself during your pregnancy and prepare for the birth of your child:   Keep all appointments with your healthcare provider. Use these visits to discuss your pregnancy concerns or problems. Write down questions before each visit so that you will not forget about things you want to talk about.   Eat healthy meals that include whole grains, fresh fruits and vegetables, and calcium-rich foods, such as milk, cheese, and yogurt. Choose foods low in saturated fat. Do not eat uncooked or undercooked meats or fish.   Avoid certain fish with high levels of mercury. These fish include shark, melonie mackerel, swordfish, and tilefish. Do not eat more than 12 ounces of fish per week, or no more than 6 ounces of tuna fish per week. Because albacore tuna fish is also high in mercury, choose light tuna.   Drink plenty of water each day.   Take vitamins, other supplements, and medicines as recommended by your provider.   Unless your healthcare provider tells you not to, try to be physically active for at least 30 minutes a day, most days of the week. If you are pressed for time, you might find it easier to exercise 10 minutes at a time, 3 times a day. Consider taking a prenatal exercise class.   Do not smoke, do not drink alcohol, and do not take illegal drugs.   Talk to your healthcare provider before you take any medicine,  including nonprescription and herbal medicines. Some medicines are not safe during pregnancy.   Avoid hot tubs or saunas.   If you have cats in your home, do not empty the cat litter while you are pregnant. It may contain a parasite that causes an infection called toxoplasmosis, which can cause birth defects. Also, use gloves when you work in garden areas used by cats.   Stay away from toxic chemicals like insecticides, solvents (such as some  or paint thinners), lead, and mercury. Check labels on household products. Most dangerous products have pregnancy warnings on their labels. Ask your healthcare provider about products if you are unsure.   Relax by taking breaks from work or chores.   Help reduce stress by sharing your feelings with others.   Report any violence or other types of abuse in your home.   Learn more about pregnancy, labor, and delivery. Read books, watch videos, go to a childbirth class, and talk with experienced moms.   Plan for the lifestyle changes a new baby will bring. Prepare for possible changes in your budget, work situation, daily schedule, and relationships with family and friends.   Talk to your provider about the pros and cons of breast-feeding.   Before and during your pregnancy, try to do everything you can to keep yourself and your baby healthy during your pregnancy.     Published by [employer].  This content is reviewed periodically and is subject to change as new health information becomes available. The information is intended to inform and educate and is not a replacement for medical evaluation, advice, diagnosis or treatment by a healthcare professional.   Developed by [employer].   ? 2010 [facility] and/or its affiliates. All Rights Reserved.   Copyright   Clinical Reference Systems 2011              Follow-ups after your visit        Follow-up notes from your care team     Return in about 4 weeks (around 5/15/2018).      Who to contact     If you have questions or  need follow up information about today's clinic visit or your schedule please contact [facility] directly at [phone].  Normal or non-critical lab and imaging results will be communicated to you by MyChart, letter or phone within 4 business days after the clinic has received the results. If you do not hear from us within 7 days, please contact the clinic through [email or web address] or phone. If you have a critical or abnormal lab result, we will notify you by phone as soon as possible.  Submit refill requests through [employer] or call your pharmacy and they will forward the refill request to us. Please allow 3 business days for your refill to be completed.          Additional Information About Your Visit        [email or web address] Information     [employer] gives you secure access to your electronic health record. If you see a primary care provider, you can also send messages to your care team and make appointments. If you have questions, please call your primary care clinic.  If you do not have a primary care provider, please call [phone] and they will assist you.        Care EveryWhere ID     This is your Care EveryWhere ID. This could be used by other organizations to access your [facility] medical records  [id number]        Your Vitals Were     Pulse Last Period BMI (Body Mass Index)             79 02/09/2018 19.58 kg/m2          Blood Pressure from Last 3 Encounters:   04/17/18 111/70   04/12/18 100/64   12/11/17 104/64    Weight from Last 3 Encounters:   04/17/18 114 lb (51.7 kg)   04/12/18 115 lb 8 oz (52.4 kg)   12/11/17 111 lb (50.3 kg)              Today, you had the following     No orders found for display       Primary Care Provider Office Phone # Fax #    Kristen M Kehr, PA-C [phone] [address] [facility] [id number]        Equal Access to Services     MARILYN HOUSER : Sandi marroquino Jo, waaxda luqadaha, qaybta kaalolivia knight, rigoberto mora  laoumar yu. So [facility] [phone].    ATENCIÓN: Si habla lavern, tiene a heredia disposición servicios gratuitos de asistencia lingüística. Nichelle al [phone].    We comply with applicable federal civil rights laws and [place] laws. We do not discriminate on the basis of race, color, national origin, age, disability, sex, sexual orientation, or gender identity.            Thank you!     Thank you for [place] [facility]  [place]. Our goal is always to provide you with excellent care. Hearing back from our patients is one way we can continue to improve our services. Please take a few minutes to complete the written survey that you may receive in the mail after your visit with us. Thank you!             Your Updated Medication List - Protect others around you: Learn how to safely use, store and throw away your medicines at www.disposemymeds.org.          This list is accurate as of 4/17/18 11:59 PM.  Always use your most recent med list.                   Brand Name Dispense Instructions for use Diagnosis    levothyroxine 50 MCG tablet    SYNTHROID    90 tablet    Take 1 tablet (50 mcg) by mouth daily    Hypothyroidism, unspecified type       prenatal multivitamin plus iron 27-0.8 MG Tabs per tablet      Take 1 tablet by mouth daily

## 2018-04-17 NOTE — PROGRESS NOTES
HPI:    Lalitha is a 32 year old yo  at 8 5/7 weeks by 6 week ultrasound who presents today for her initial OB visit.    Issues:  Some nausea    Past OB Hx: 1 ectopic     Father of baby: No health issues       Past Medical History:   Diagnosis Date     GERD (gastroesophageal reflux disease)      Hypothyroid      Intradermal nevus 2007    right arm-irritated intradermal nevus             Past Surgical History:   Procedure Laterality Date     NO HISTORY OF SURGERY       SALPINGECTOMY Left 2017    Ectopic pregnancy        Family History   Problem Relation Age of Onset     Neurologic Disorder Mother      MSAND LUPUS     Thyroid Disease Mother      Depression Father      Thyroid Disease Father      Depression Brother      Thyroid Disease Maternal Grandmother      CANCER Maternal Grandmother      Thyroid Disease Maternal Grandfather      Thyroid Disease Paternal Grandmother      Thyroid Disease Paternal Grandfather      DIABETES No family hx of      Hypertension No family hx of      CEREBROVASCULAR DISEASE No family hx of      Glaucoma No family hx of      Macular Degeneration No family hx of         Social History     Social History     Marital status:      Spouse name: N/A     Number of children: N/A     Years of education: N/A     Occupational History     Not on file.     Social History Main Topics     Smoking status: Former Smoker     Quit date: 2008     Smokeless tobacco: Never Used      Comment: Lives in smoke free household     Alcohol use No     Drug use: No     Sexual activity: Yes     Partners: Male     Other Topics Concern     Not on file     Social History Narrative         Current Outpatient Prescriptions:      Prenatal Vit-Fe Fumarate-FA (PRENATAL MULTIVITAMIN PLUS IRON) 27-0.8 MG TABS per tablet, Take 1 tablet by mouth daily, Disp: , Rfl:      levothyroxine (SYNTHROID) 50 MCG tablet, Take 1 tablet (50 mcg) by mouth daily, Disp: 90 tablet, Rfl: 3      Objective:  PELVIC:  Recently  completed on , but patient declines GC and Chlamydia      Assessment:   1.  IUP at 8 5/7 weeks here for her initial OB visit    Plan:    1.  PNV  2.  NOB labs already done and ultrasound   3.  Discussed routine prenatal care, quad screen, GCT, anatomy scan at ~19 weeks, frequency of visits.  4.  Discussed first trimester screen and she wants it done, [facility] referral placed  5.  [place] [facility] [place].  RTC 4 weeks.  7.  FOB required 5 heart surgeries=[facility] level II ultrasound, daughter with Charot Maida tooth disease  8.  Subchorionic hemorrhage  9.  Hypothyroidism      Discussed physician coverage, tertiary support, diet, exercise, weight gain, schedule of visits, routine and indicated ultrasounds, and childbirth education.    Options for  testing for chromosomal and birth defects were discussed with the patient. Diagnostic tests include CVS and Amniocentesis. We discussed that these tests are definitive but invasive and do carry a risk of fetal loss.   Screening tests include nuchal translucency/blood marker testing in the first trimester and quad screening in the second trimester. We discussed that these are screening tests and not diagnostic tests and that false positives and negatives are a distinct possibility.     35 minutes was spent face to face with the patient today discussing her history, diagnosis, and follow-up plan as noted above.  Over 50% of the visit was spent in counseling and coordination of care.    Total Visit Time: 35 minutes      Tatianna Ch

## 2018-04-19 PROBLEM — Z23 NEED FOR TDAP VACCINATION: Status: ACTIVE | Noted: 2018-04-19

## 2018-04-19 PROBLEM — Z34.91 NORMAL PREGNANCY IN FIRST TRIMESTER: Status: ACTIVE | Noted: 2018-04-19

## 2018-04-19 NOTE — PATIENT INSTRUCTIONS
Prenatal Care  What is prenatal care?   Prenatal care is the care you receive when you are pregnant. It includes care given by your healthcare provider, support from your family, and an extra focus on giving yourself the care you need during this special time. Prenatal care improves your chances for a healthy pregnancy and healthy baby.   When should I see my healthcare provider?   Good care during pregnancy includes regularly scheduled prenatal exams.   If you are not yet pregnant but planning to get pregnant in the next few months, see your provider. Your provider may do some tests and talk about things you can do to have a healthy pregnancy and healthy baby.   You should schedule your first prenatal visit with your provider as soon as you think or know you are pregnant. Depending on your health and health history, your provider will probably schedule visits at least once a month for the first 6 months. During the 7th and 8th months you will see your provider every 2 weeks. During the last month you will probably see your provider once a week until you deliver your baby. If your pregnancy is high risk, your provider will probably want to see you more often. In some cases your provider may refer you to a medical specialist for more help with special needs, such as diabetes.   At each visit your healthcare provider will check to make sure that you and the baby are healthy. Regular visits can help you and your provider prevent possible problems. They can also help your provider find and treat any problems early. In addition to meeting your medical needs, your provider advise you about caring for yourself. You will talk about how to have a healthy diet and get plenty of exercise and rest. Your provider can also help you deal with the emotional changes that can happen during pregnancy.   What will happen at the first prenatal visit?   At your first visit, your provider will ask about your personal medical history. He  or she will also ask about the baby's father and your family health history. This information can help give your provider an idea of any problems you might have during your pregnancy. You will have a physical exam, including checks of your height, weight, and blood pressure and a pelvic exam. You will have a Pap test, urine tests, blood tests, and cultures of the cervix and vagina to check for infection.   Your provider will calculate your due date and the age of your baby. If your periods were regular before you got pregnant, and you are sure of the day when your last period started, your due date will be estimated to be 40 weeks from that day.   Your provider will talk to you about how to stay healthy during your pregnancy.   What will happen at other prenatal visits?   Your provider will check how you are doing and how the baby is developing. He or she will discuss how you are feeling, ask if you have any problems, and answer your questions.   During each prenatal visit your provider will:   weigh you   take your blood pressure   check your urine for sugar, protein, or bacteria   check your face, hands, ankles, and feet for swelling   listen to the baby's heartbeat   measure the size of the uterus to check the baby's growth.   At different times during the pregnancy, other exams and tests may be done. Some are routine and others are done only when a problem is suspected or you have a risk factor for a problem. Examples of other tests you might have are:   tests to check for genetic problems and some birth defects, such as:   chorionic villus sampling of cells from the placenta   amniocentesis to test the fluid around the baby   blood tests called triple or quad screens   ultrasound scans to check the baby's growth, development, and health and to look at your uterus, the amniotic sac, and the placenta   blood tests to check for diabetes   electronic monitoring to check the health of the baby.   How can I take care  of myself during my pregnancy?   Here are some things you can do to take good care of yourself during your pregnancy and prepare for the birth of your child:   Keep all appointments with your healthcare provider. Use these visits to discuss your pregnancy concerns or problems. Write down questions before each visit so that you will not forget about things you want to talk about.   Eat healthy meals that include whole grains, fresh fruits and vegetables, and calcium-rich foods, such as milk, cheese, and yogurt. Choose foods low in saturated fat. Do not eat uncooked or undercooked meats or fish.   Avoid certain fish with high levels of mercury. These fish include shark, melonie mackerel, swordfish, and tilefish. Do not eat more than 12 ounces of fish per week, or no more than 6 ounces of tuna fish per week. Because albacore tuna fish is also high in mercury, choose light tuna.   Drink plenty of water each day.   Take vitamins, other supplements, and medicines as recommended by your provider.   Unless your healthcare provider tells you not to, try to be physically active for at least 30 minutes a day, most days of the week. If you are pressed for time, you might find it easier to exercise 10 minutes at a time, 3 times a day. Consider taking a prenatal exercise class.   Do not smoke, do not drink alcohol, and do not take illegal drugs.   Talk to your healthcare provider before you take any medicine, including nonprescription and herbal medicines. Some medicines are not safe during pregnancy.   Avoid hot tubs or saunas.   If you have cats in your home, do not empty the cat litter while you are pregnant. It may contain a parasite that causes an infection called toxoplasmosis, which can cause birth defects. Also, use gloves when you work in garden areas used by cats.   Stay away from toxic chemicals like insecticides, solvents (such as some  or paint thinners), lead, and mercury. Check labels on household products.  Most dangerous products have pregnancy warnings on their labels. Ask your healthcare provider about products if you are unsure.   Relax by taking breaks from work or chores.   Help reduce stress by sharing your feelings with others.   Report any violence or other types of abuse in your home.   Learn more about pregnancy, labor, and delivery. Read books, watch videos, go to a childbirth class, and talk with experienced moms.   Plan for the lifestyle changes a new baby will bring. Prepare for possible changes in your budget, work situation, daily schedule, and relationships with family and friends.   Talk to your provider about the pros and cons of breast-feeding.   Before and during your pregnancy, try to do everything you can to keep yourself and your baby healthy during your pregnancy.     Published by [employer].  This content is reviewed periodically and is subject to change as new health information becomes available. The information is intended to inform and educate and is not a replacement for medical evaluation, advice, diagnosis or treatment by a healthcare professional.   Developed by [employer].   ? 2010 [employer] and/or its affiliates. All Rights Reserved.   Copyright   [employer] Reference Systems 2011

## 2018-04-20 DIAGNOSIS — E03.9 HYPOTHYROIDISM, UNSPECIFIED TYPE: ICD-10-CM

## 2018-04-20 RX ORDER — LEVOTHYROXINE SODIUM 50 UG/1
TABLET ORAL
Qty: 90 TABLET | Refills: 1 | Status: SHIPPED | OUTPATIENT
Start: 2018-04-20 | End: 2018-10-15

## 2018-04-25 ENCOUNTER — MYC MEDICAL ADVICE (OUTPATIENT)
Dept: OBGYN | Facility: CLINIC | Age: 33
End: 2018-04-25

## 2018-05-09 ENCOUNTER — TRANSFERRED RECORDS (OUTPATIENT)
Dept: HEALTH INFORMATION MANAGEMENT | Facility: CLINIC | Age: 33
End: 2018-05-09

## 2018-05-14 ENCOUNTER — PRENATAL OFFICE VISIT (OUTPATIENT)
Dept: OBGYN | Facility: OTHER | Age: 33
End: 2018-05-14
Payer: COMMERCIAL

## 2018-05-14 VITALS
BODY MASS INDEX: 19.75 KG/M2 | SYSTOLIC BLOOD PRESSURE: 98 MMHG | WEIGHT: 115 LBS | DIASTOLIC BLOOD PRESSURE: 58 MMHG | HEART RATE: 88 BPM

## 2018-05-14 DIAGNOSIS — E03.9 HYPOTHYROIDISM, UNSPECIFIED TYPE: ICD-10-CM

## 2018-05-14 DIAGNOSIS — Z87.59 S/P ECTOPIC PREGNANCY: ICD-10-CM

## 2018-05-14 DIAGNOSIS — Z23 NEED FOR TDAP VACCINATION: ICD-10-CM

## 2018-05-14 DIAGNOSIS — Z34.91 NORMAL PREGNANCY IN FIRST TRIMESTER: Primary | ICD-10-CM

## 2018-05-14 PROCEDURE — 99207 ZZC PRENATAL VISIT: CPT | Performed by: OBSTETRICS & GYNECOLOGY

## 2018-05-14 ASSESSMENT — PAIN SCALES - GENERAL: PAINLEVEL: NO PAIN (0)

## 2018-05-14 NOTE — MR AVS SNAPSHOT
After Visit Summary   5/14/2018    Lalitha Cortez    MRN: [id number]           Patient Information     Date Of Birth          1985        Visit Information        Provider Department      5/14/2018 7:45 AM Tatianna Ch, DO [facility]        Today's Diagnoses     Normal pregnancy in first trimester    -  1    Need for Tdap vaccination        S/P ectopic pregnancy        Hypothyroidism, unspecified type          Care Instructions    Prenatal Care  What is prenatal care?   Prenatal care is the care you receive when you are pregnant. It includes care given by your healthcare provider, support from your family, and an extra focus on giving yourself the care you need during this special time. Prenatal care improves your chances for a healthy pregnancy and healthy baby.   When should I see my healthcare provider?   Good care during pregnancy includes regularly scheduled prenatal exams.   If you are not yet pregnant but planning to get pregnant in the next few months, see your provider. Your provider may do some tests and talk about things you can do to have a healthy pregnancy and healthy baby.   You should schedule your first prenatal visit with your provider as soon as you think or know you are pregnant. Depending on your health and health history, your provider will probably schedule visits at least once a month for the first 6 months. During the 7th and 8th months you will see your provider every 2 weeks. During the last month you will probably see your provider once a week until you deliver your baby. If your pregnancy is high risk, your provider will probably want to see you more often. In some cases your provider may refer you to a medical specialist for more help with special needs, such as diabetes.   At each visit your healthcare provider will check to make sure that you and the baby are healthy. Regular visits can help you and your provider prevent possible problems.  They can also help your provider find and treat any problems early. In addition to meeting your medical needs, your provider advise you about caring for yourself. You will talk about how to have a healthy diet and get plenty of exercise and rest. Your provider can also help you deal with the emotional changes that can happen during pregnancy.   What will happen at the first prenatal visit?   At your first visit, your provider will ask about your personal medical history. He or she will also ask about the baby's father and your family health history. This information can help give your provider an idea of any problems you might have during your pregnancy. You will have a physical exam, including checks of your height, weight, and blood pressure and a pelvic exam. You will have a Pap test, urine tests, blood tests, and cultures of the cervix and vagina to check for infection.   Your provider will calculate your due date and the age of your baby. If your periods were regular before you got pregnant, and you are sure of the day when your last period started, your due date will be estimated to be 40 weeks from that day.   Your provider will talk to you about how to stay healthy during your pregnancy.   What will happen at other prenatal visits?   Your provider will check how you are doing and how the baby is developing. He or she will discuss how you are feeling, ask if you have any problems, and answer your questions.   During each prenatal visit your provider will:   weigh you   take your blood pressure   check your urine for sugar, protein, or bacteria   check your face, hands, ankles, and feet for swelling   listen to the baby's heartbeat   measure the size of the uterus to check the baby's growth.   At different times during the pregnancy, other exams and tests may be done. Some are routine and others are done only when a problem is suspected or you have a risk factor for a problem. Examples of other tests you might  have are:   tests to check for genetic problems and some birth defects, such as:   chorionic villus sampling of cells from the placenta   amniocentesis to test the fluid around the baby   blood tests called triple or quad screens   ultrasound scans to check the baby's growth, development, and health and to look at your uterus, the amniotic sac, and the placenta   blood tests to check for diabetes   electronic monitoring to check the health of the baby.   How can I take care of myself during my pregnancy?   Here are some things you can do to take good care of yourself during your pregnancy and prepare for the birth of your child:   Keep all appointments with your healthcare provider. Use these visits to discuss your pregnancy concerns or problems. Write down questions before each visit so that you will not forget about things you want to talk about.   Eat healthy meals that include whole grains, fresh fruits and vegetables, and calcium-rich foods, such as milk, cheese, and yogurt. Choose foods low in saturated fat. Do not eat uncooked or undercooked meats or fish.   Avoid certain fish with high levels of mercury. These fish include shark, melonie mackerel, swordfish, and tilefish. Do not eat more than 12 ounces of fish per week, or no more than 6 ounces of tuna fish per week. Because albacore tuna fish is also high in mercury, choose light tuna.   Drink plenty of water each day.   Take vitamins, other supplements, and medicines as recommended by your provider.   Unless your healthcare provider tells you not to, try to be physically active for at least 30 minutes a day, most days of the week. If you are pressed for time, you might find it easier to exercise 10 minutes at a time, 3 times a day. Consider taking a prenatal exercise class.   Do not smoke, do not drink alcohol, and do not take illegal drugs.   Talk to your healthcare provider before you take any medicine, including nonprescription and herbal medicines. Some  medicines are not safe during pregnancy.   Avoid hot tubs or saunas.   If you have cats in your home, do not empty the cat litter while you are pregnant. It may contain a parasite that causes an infection called toxoplasmosis, which can cause birth defects. Also, use gloves when you work in garden areas used by cats.   Stay away from toxic chemicals like insecticides, solvents (such as some  or paint thinners), lead, and mercury. Check labels on household products. Most dangerous products have pregnancy warnings on their labels. Ask your healthcare provider about products if you are unsure.   Relax by taking breaks from work or chores.   Help reduce stress by sharing your feelings with others.   Report any violence or other types of abuse in your home.   Learn more about pregnancy, labor, and delivery. Read books, watch videos, go to a childbirth class, and talk with experienced moms.   Plan for the lifestyle changes a new baby will bring. Prepare for possible changes in your budget, work situation, daily schedule, and relationships with family and friends.   Talk to your provider about the pros and cons of breast-feeding.   Before and during your pregnancy, try to do everything you can to keep yourself and your baby healthy during your pregnancy.     Published by [employer].  This content is reviewed periodically and is subject to change as new health information becomes available. The information is intended to inform and educate and is not a replacement for medical evaluation, advice, diagnosis or treatment by a healthcare professional.   Developed by [employer].   ? 2010 [facility] and/or its affiliates. All Rights Reserved.   Copyright   Clinical Reference Systems 2011              Follow-ups after your visit        Follow-up notes from your care team     Return in about 4 weeks (around 6/11/2018).      Who to contact     If you have questions or need follow up information about today's clinic visit  or your schedule please contact [facility] [place] directly at [phone].  Normal or non-critical lab and imaging results will be communicated to you by MyChart, letter or phone within 4 business days after the clinic has received the results. If you do not hear from us within 7 days, please contact the clinic through [employer] or phone. If you have a critical or abnormal lab result, we will notify you by phone as soon as possible.  Submit refill requests through [employer] or call your pharmacy and they will forward the refill request to us. Please allow 3 business days for your refill to be completed.          Additional Information About Your Visit        [employer]ozes Information     [employer] gives you secure access to your electronic health record. If you see a primary care provider, you can also send messages to your care team and make appointments. If you have questions, please call your primary care clinic.  If you do not have a primary care provider, please call [phone] and they will assist you.        Care EveryWhere ID     This is your Care EveryWhere ID. This could be used by other organizations to access your [facility] medical records  [id number]        Your Vitals Were     Pulse Last Period BMI (Body Mass Index)             88 02/09/2018 19.75 kg/m2          Blood Pressure from Last 3 Encounters:   05/14/18 98/58   04/17/18 111/70   04/12/18 100/64    Weight from Last 3 Encounters:   05/14/18 115 lb (52.2 kg)   04/17/18 114 lb (51.7 kg)   04/12/18 115 lb 8 oz (52.4 kg)              Today, you had the following     No orders found for display       Primary Care Provider Office Phone # Fax #    Kristen M Kehr, PA-C [phone] [id number] [facility] [address]        Equal Access to Services     MARILYN HOUSER : Sandi Osorio, myla gaviria, anshul knight, rigoberto yu. [facility] [phone].    ATENCIÓN: Paige hogan  español, tiene a heredia disposición servicios gratuitos de asistencia lingüística. Nichelle thomas [phone].    We comply with applicable federal civil rights laws and [place] laws. We do not discriminate on the basis of race, color, national origin, age, disability, sex, sexual orientation, or gender identity.            Thank you!     Thank you for choosing [facility]  for your care. Our goal is always to provide you with excellent care. Hearing back from our patients is one way we can continue to improve our services. Please take a few minutes to complete the written survey that you may receive in the mail after your visit with us. Thank you!             Your Updated Medication List - Protect others around you: Learn how to safely use, store and throw away your medicines at www.disposemymeds.org.          This list is accurate as of 5/14/18  7:56 AM.  Always use your most recent med list.                   Brand Name Dispense Instructions for use Diagnosis    DHA PO           levothyroxine 50 MCG tablet    SYNTHROID/LEVOTHROID    90 tablet    TAKE 1 TABLET BY MOUTH EVERY DAY    Hypothyroidism, unspecified type       prenatal multivitamin plus iron 27-0.8 MG Tabs per tablet      Take 1 tablet by mouth daily

## 2018-05-14 NOTE — PROGRESS NOTES
32 year old  at 12w4d weeks presents to the clinic for a routine prenatal visit.  Feeling well.  No vaginal bleeding, leakage of fluid, or contractions   Fundal height=s=d  OSQn=472  Hypothyroidism=stable  RTC 4 weeks.    Tatianna Ch

## 2018-05-14 NOTE — PATIENT INSTRUCTIONS
Prenatal Care  What is prenatal care?   Prenatal care is the care you receive when you are pregnant. It includes care given by your healthcare provider, support from your family, and an extra focus on giving yourself the care you need during this special time. Prenatal care improves your chances for a healthy pregnancy and healthy baby.   When should I see my healthcare provider?   Good care during pregnancy includes regularly scheduled prenatal exams.   If you are not yet pregnant but planning to get pregnant in the next few months, see your provider. Your provider may do some tests and talk about things you can do to have a healthy pregnancy and healthy baby.   You should schedule your first prenatal visit with your provider as soon as you think or know you are pregnant. Depending on your health and health history, your provider will probably schedule visits at least once a month for the first 6 months. During the 7th and 8th months you will see your provider every 2 weeks. During the last month you will probably see your provider once a week until you deliver your baby. If your pregnancy is high risk, your provider will probably want to see you more often. In some cases your provider may refer you to a medical specialist for more help with special needs, such as diabetes.   At each visit your healthcare provider will check to make sure that you and the baby are healthy. Regular visits can help you and your provider prevent possible problems. They can also help your provider find and treat any problems early. In addition to meeting your medical needs, your provider advise you about caring for yourself. You will talk about how to have a healthy diet and get plenty of exercise and rest. Your provider can also help you deal with the emotional changes that can happen during pregnancy.   What will happen at the first prenatal visit?   At your first visit, your provider will ask about your personal medical history. He  or she will also ask about the baby's father and your family health history. This information can help give your provider an idea of any problems you might have during your pregnancy. You will have a physical exam, including checks of your height, weight, and blood pressure and a pelvic exam. You will have a Pap test, urine tests, blood tests, and cultures of the cervix and vagina to check for infection.   Your provider will calculate your due date and the age of your baby. If your periods were regular before you got pregnant, and you are sure of the day when your last period started, your due date will be estimated to be 40 weeks from that day.   Your provider will talk to you about how to stay healthy during your pregnancy.   What will happen at other prenatal visits?   Your provider will check how you are doing and how the baby is developing. He or she will discuss how you are feeling, ask if you have any problems, and answer your questions.   During each prenatal visit your provider will:   weigh you   take your blood pressure   check your urine for sugar, protein, or bacteria   check your face, hands, ankles, and feet for swelling   listen to the baby's heartbeat   measure the size of the uterus to check the baby's growth.   At different times during the pregnancy, other exams and tests may be done. Some are routine and others are done only when a problem is suspected or you have a risk factor for a problem. Examples of other tests you might have are:   tests to check for genetic problems and some birth defects, such as:   chorionic villus sampling of cells from the placenta   amniocentesis to test the fluid around the baby   blood tests called triple or quad screens   ultrasound scans to check the baby's growth, development, and health and to look at your uterus, the amniotic sac, and the placenta   blood tests to check for diabetes   electronic monitoring to check the health of the baby.   How can I take care  of myself during my pregnancy?   Here are some things you can do to take good care of yourself during your pregnancy and prepare for the birth of your child:   Keep all appointments with your healthcare provider. Use these visits to discuss your pregnancy concerns or problems. Write down questions before each visit so that you will not forget about things you want to talk about.   Eat healthy meals that include whole grains, fresh fruits and vegetables, and calcium-rich foods, such as milk, cheese, and yogurt. Choose foods low in saturated fat. Do not eat uncooked or undercooked meats or fish.   Avoid certain fish with high levels of mercury. These fish include shark, melonie mackerel, swordfish, and tilefish. Do not eat more than 12 ounces of fish per week, or no more than 6 ounces of tuna fish per week. Because albacore tuna fish is also high in mercury, choose light tuna.   Drink plenty of water each day.   Take vitamins, other supplements, and medicines as recommended by your provider.   Unless your healthcare provider tells you not to, try to be physically active for at least 30 minutes a day, most days of the week. If you are pressed for time, you might find it easier to exercise 10 minutes at a time, 3 times a day. Consider taking a prenatal exercise class.   Do not smoke, do not drink alcohol, and do not take illegal drugs.   Talk to your healthcare provider before you take any medicine, including nonprescription and herbal medicines. Some medicines are not safe during pregnancy.   Avoid hot tubs or saunas.   If you have cats in your home, do not empty the cat litter while you are pregnant. It may contain a parasite that causes an infection called toxoplasmosis, which can cause birth defects. Also, use gloves when you work in garden areas used by cats.   Stay away from toxic chemicals like insecticides, solvents (such as some  or paint thinners), lead, and mercury. Check labels on household products.  Most dangerous products have pregnancy warnings on their labels. Ask your healthcare provider about products if you are unsure.   Relax by taking breaks from work or chores.   Help reduce stress by sharing your feelings with others.   Report any violence or other types of abuse in your home.   Learn more about pregnancy, labor, and delivery. Read books, watch videos, go to a childbirth class, and talk with experienced moms.   Plan for the lifestyle changes a new baby will bring. Prepare for possible changes in your budget, work situation, daily schedule, and relationships with family and friends.   Talk to your provider about the pros and cons of breast-feeding.   Before and during your pregnancy, try to do everything you can to keep yourself and your baby healthy during your pregnancy.     Published by [employer].  This content is reviewed periodically and is subject to change as new health information becomes available. The information is intended to inform and educate and is not a replacement for medical evaluation, advice, diagnosis or treatment by a healthcare professional.   Developed by [employer].   ? 2010 [employer] and/or its affiliates. All Rights Reserved.   Copyright   Clinical Reference Systems 2011

## 2018-06-01 ENCOUNTER — PRENATAL OFFICE VISIT (OUTPATIENT)
Dept: OBGYN | Facility: OTHER | Age: 33
End: 2018-06-01
Payer: COMMERCIAL

## 2018-06-01 VITALS
DIASTOLIC BLOOD PRESSURE: 58 MMHG | WEIGHT: 116.75 LBS | SYSTOLIC BLOOD PRESSURE: 90 MMHG | HEART RATE: 88 BPM | BODY MASS INDEX: 20.06 KG/M2

## 2018-06-01 DIAGNOSIS — Z34.92 NORMAL PREGNANCY IN SECOND TRIMESTER: Primary | ICD-10-CM

## 2018-06-01 DIAGNOSIS — E03.9 HYPOTHYROIDISM, UNSPECIFIED TYPE: ICD-10-CM

## 2018-06-01 PROCEDURE — 99207 ZZC PRENATAL VISIT: CPT | Performed by: OBSTETRICS & GYNECOLOGY

## 2018-06-01 ASSESSMENT — PAIN SCALES - GENERAL: PAINLEVEL: NO PAIN (0)

## 2018-06-01 NOTE — PROGRESS NOTES
32 year old  at 15w1d weeks presents to the clinic for a prenatal visit.  Patient was at a concert Wednesday night and she has what she thought was a panic attack  She felt like she was going to black out.  She felt better after drinking water.  She had eaten  She feels like her ears are pooping.  She stopped her allergy medications when she found out she was pregnant.  We discussed Claritin.  Feeling well.  No vaginal bleeding, leakage of fluid, or contractions   Fundal height=s=d  XXEy=193's  Hypothyroidism=TSH=2.74 in April  RTC as scheduled    Tatianna Ch

## 2018-06-01 NOTE — PATIENT INSTRUCTIONS
Prenatal Care  What is prenatal care?   Prenatal care is the care you receive when you are pregnant. It includes care given by your healthcare provider, support from your family, and an extra focus on giving yourself the care you need during this special time. Prenatal care improves your chances for a healthy pregnancy and healthy baby.   When should I see my healthcare provider?   Good care during pregnancy includes regularly scheduled prenatal exams.   If you are not yet pregnant but planning to get pregnant in the next few months, see your provider. Your provider may do some tests and talk about things you can do to have a healthy pregnancy and healthy baby.   You should schedule your first prenatal visit with your provider as soon as you think or know you are pregnant. Depending on your health and health history, your provider will probably schedule visits at least once a month for the first 6 months. During the 7th and 8th months you will see your provider every 2 weeks. During the last month you will probably see your provider once a week until you deliver your baby. If your pregnancy is high risk, your provider will probably want to see you more often. In some cases your provider may refer you to a medical specialist for more help with special needs, such as diabetes.   At each visit your healthcare provider will check to make sure that you and the baby are healthy. Regular visits can help you and your provider prevent possible problems. They can also help your provider find and treat any problems early. In addition to meeting your medical needs, your provider advise you about caring for yourself. You will talk about how to have a healthy diet and get plenty of exercise and rest. Your provider can also help you deal with the emotional changes that can happen during pregnancy.   What will happen at the first prenatal visit?   At your first visit, your provider will ask about your personal medical history. He  or she will also ask about the baby's father and your family health history. This information can help give your provider an idea of any problems you might have during your pregnancy. You will have a physical exam, including checks of your height, weight, and blood pressure and a pelvic exam. You will have a Pap test, urine tests, blood tests, and cultures of the cervix and vagina to check for infection.   Your provider will calculate your due date and the age of your baby. If your periods were regular before you got pregnant, and you are sure of the day when your last period started, your due date will be estimated to be 40 weeks from that day.   Your provider will talk to you about how to stay healthy during your pregnancy.   What will happen at other prenatal visits?   Your provider will check how you are doing and how the baby is developing. He or she will discuss how you are feeling, ask if you have any problems, and answer your questions.   During each prenatal visit your provider will:   weigh you   take your blood pressure   check your urine for sugar, protein, or bacteria   check your face, hands, ankles, and feet for swelling   listen to the baby's heartbeat   measure the size of the uterus to check the baby's growth.   At different times during the pregnancy, other exams and tests may be done. Some are routine and others are done only when a problem is suspected or you have a risk factor for a problem. Examples of other tests you might have are:   tests to check for genetic problems and some birth defects, such as:   chorionic villus sampling of cells from the placenta   amniocentesis to test the fluid around the baby   blood tests called triple or quad screens   ultrasound scans to check the baby's growth, development, and health and to look at your uterus, the amniotic sac, and the placenta   blood tests to check for diabetes   electronic monitoring to check the health of the baby.   How can I take care  of myself during my pregnancy?   Here are some things you can do to take good care of yourself during your pregnancy and prepare for the birth of your child:   Keep all appointments with your healthcare provider. Use these visits to discuss your pregnancy concerns or problems. Write down questions before each visit so that you will not forget about things you want to talk about.   Eat healthy meals that include whole grains, fresh fruits and vegetables, and calcium-rich foods, such as milk, cheese, and yogurt. Choose foods low in saturated fat. Do not eat uncooked or undercooked meats or fish.   Avoid certain fish with high levels of mercury. These fish include shark, melonie mackerel, swordfish, and tilefish. Do not eat more than 12 ounces of fish per week, or no more than 6 ounces of tuna fish per week. Because albacore tuna fish is also high in mercury, choose light tuna.   Drink plenty of water each day.   Take vitamins, other supplements, and medicines as recommended by your provider.   Unless your healthcare provider tells you not to, try to be physically active for at least 30 minutes a day, most days of the week. If you are pressed for time, you might find it easier to exercise 10 minutes at a time, 3 times a day. Consider taking a prenatal exercise class.   Do not smoke, do not drink alcohol, and do not take illegal drugs.   Talk to your healthcare provider before you take any medicine, including nonprescription and herbal medicines. Some medicines are not safe during pregnancy.   Avoid hot tubs or saunas.   If you have cats in your home, do not empty the cat litter while you are pregnant. It may contain a parasite that causes an infection called toxoplasmosis, which can cause birth defects. Also, use gloves when you work in garden areas used by cats.   Stay away from toxic chemicals like insecticides, solvents (such as some  or paint thinners), lead, and mercury. Check labels on household products.  Most dangerous products have pregnancy warnings on their labels. Ask your healthcare provider about products if you are unsure.   Relax by taking breaks from work or chores.   Help reduce stress by sharing your feelings with others.   Report any violence or other types of abuse in your home.   Learn more about pregnancy, labor, and delivery. Read books, watch videos, go to a childbirth class, and talk with experienced moms.   Plan for the lifestyle changes a new baby will bring. Prepare for possible changes in your budget, work situation, daily schedule, and relationships with family and friends.   Talk to your provider about the pros and cons of breast-feeding.   Before and during your pregnancy, try to do everything you can to keep yourself and your baby healthy during your pregnancy.     Published by [employer].  This content is reviewed periodically and is subject to change as new health information becomes available. The information is intended to inform and educate and is not a replacement for medical evaluation, advice, diagnosis or treatment by a healthcare professional.   Developed by [employer].   ? 2010 [employer] and/or its affiliates. All Rights Reserved.   Copyright   [employer] 2011

## 2018-06-01 NOTE — MR AVS SNAPSHOT
After Visit Summary   6/1/2018    Lalitha Cortez    MRN: [id number]           Patient Information     Date Of Birth          1985        Visit Information        Provider Department      6/1/2018 4:15 PM Tatianna Ch, DO [facility]        Today's Diagnoses     Normal pregnancy in second trimester    -  1    Hypothyroidism, unspecified type          Care Instructions    Prenatal Care  What is prenatal care?   Prenatal care is the care you receive when you are pregnant. It includes care given by your healthcare provider, support from your family, and an extra focus on giving yourself the care you need during this special time. Prenatal care improves your chances for a healthy pregnancy and healthy baby.   When should I see my healthcare provider?   Good care during pregnancy includes regularly scheduled prenatal exams.   If you are not yet pregnant but planning to get pregnant in the next few months, see your provider. Your provider may do some tests and talk about things you can do to have a healthy pregnancy and healthy baby.   You should schedule your first prenatal visit with your provider as soon as you think or know you are pregnant. Depending on your health and health history, your provider will probably schedule visits at least once a month for the first 6 months. During the 7th and 8th months you will see your provider every 2 weeks. During the last month you will probably see your provider once a week until you deliver your baby. If your pregnancy is high risk, your provider will probably want to see you more often. In some cases your provider may refer you to a medical specialist for more help with special needs, such as diabetes.   At each visit your healthcare provider will check to make sure that you and the baby are healthy. Regular visits can help you and your provider prevent possible problems. They can also help your provider find and treat any problems  early. In addition to meeting your medical needs, your provider advise you about caring for yourself. You will talk about how to have a healthy diet and get plenty of exercise and rest. Your provider can also help you deal with the emotional changes that can happen during pregnancy.   What will happen at the first prenatal visit?   At your first visit, your provider will ask about your personal medical history. He or she will also ask about the baby's father and your family health history. This information can help give your provider an idea of any problems you might have during your pregnancy. You will have a physical exam, including checks of your height, weight, and blood pressure and a pelvic exam. You will have a Pap test, urine tests, blood tests, and cultures of the cervix and vagina to check for infection.   Your provider will calculate your due date and the age of your baby. If your periods were regular before you got pregnant, and you are sure of the day when your last period started, your due date will be estimated to be 40 weeks from that day.   Your provider will talk to you about how to stay healthy during your pregnancy.   What will happen at other prenatal visits?   Your provider will check how you are doing and how the baby is developing. He or she will discuss how you are feeling, ask if you have any problems, and answer your questions.   During each prenatal visit your provider will:   weigh you   take your blood pressure   check your urine for sugar, protein, or bacteria   check your face, hands, ankles, and feet for swelling   listen to the baby's heartbeat   measure the size of the uterus to check the baby's growth.   At different times during the pregnancy, other exams and tests may be done. Some are routine and others are done only when a problem is suspected or you have a risk factor for a problem. Examples of other tests you might have are:   tests to check for genetic problems and some  birth defects, such as:   chorionic villus sampling of cells from the placenta   amniocentesis to test the fluid around the baby   blood tests called triple or quad screens   ultrasound scans to check the baby's growth, development, and health and to look at your uterus, the amniotic sac, and the placenta   blood tests to check for diabetes   electronic monitoring to check the health of the baby.   How can I take care of myself during my pregnancy?   Here are some things you can do to take good care of yourself during your pregnancy and prepare for the birth of your child:   Keep all appointments with your healthcare provider. Use these visits to discuss your pregnancy concerns or problems. Write down questions before each visit so that you will not forget about things you want to talk about.   Eat healthy meals that include whole grains, fresh fruits and vegetables, and calcium-rich foods, such as milk, cheese, and yogurt. Choose foods low in saturated fat. Do not eat uncooked or undercooked meats or fish.   Avoid certain fish with high levels of mercury. These fish include shark, melonie mackerel, swordfish, and tilefish. Do not eat more than 12 ounces of fish per week, or no more than 6 ounces of tuna fish per week. Because albacore tuna fish is also high in mercury, choose light tuna.   Drink plenty of water each day.   Take vitamins, other supplements, and medicines as recommended by your provider.   Unless your healthcare provider tells you not to, try to be physically active for at least 30 minutes a day, most days of the week. If you are pressed for time, you might find it easier to exercise 10 minutes at a time, 3 times a day. Consider taking a prenatal exercise class.   Do not smoke, do not drink alcohol, and do not take illegal drugs.   Talk to your healthcare provider before you take any medicine, including nonprescription and herbal medicines. Some medicines are not safe during pregnancy.   Avoid hot tubs  or saunas.   If you have cats in your home, do not empty the cat litter while you are pregnant. It may contain a parasite that causes an infection called toxoplasmosis, which can cause birth defects. Also, use gloves when you work in garden areas used by cats.   Stay away from toxic chemicals like insecticides, solvents (such as some  or paint thinners), lead, and mercury. Check labels on household products. Most dangerous products have pregnancy warnings on their labels. Ask your healthcare provider about products if you are unsure.   Relax by taking breaks from work or chores.   Help reduce stress by sharing your feelings with others.   Report any violence or other types of abuse in your home.   Learn more about pregnancy, labor, and delivery. Read books, watch videos, go to a childbirth class, and talk with experienced moms.   Plan for the lifestyle changes a new baby will bring. Prepare for possible changes in your budget, work situation, daily schedule, and relationships with family and friends.   Talk to your provider about the pros and cons of breast-feeding.   Before and during your pregnancy, try to do everything you can to keep yourself and your baby healthy during your pregnancy.     Published by [employer].  This content is reviewed periodically and is subject to change as new health information becomes available. The information is intended to inform and educate and is not a replacement for medical evaluation, advice, diagnosis or treatment by a healthcare professional.   Developed by [employer].   ? 2010 [employer] and/or its affiliates. All Rights Reserved.   Copyright   [employer] 2011              Follow-ups after your visit        Follow-up notes from your care team     Return in about 4 weeks (around 6/29/2018).      Your next 10 appointments already scheduled     Jun 12, 2018  8:45 AM CDT   ESTABLISHED PRENATAL with Tatianna Ch DO   [facility]  [place]    [address] [address] [place] [id number] [phone]              Who to contact     If you have questions or need follow up information about today's clinic visit or your schedule please contact [facility] [place] directly at [phone].  Normal or non-critical lab and imaging results will be communicated to you by MyChart, letter or phone within 4 business days after the clinic has received the results. If you do not hear from us within 7 days, please contact the clinic through MyChart or phone. If you have a critical or abnormal lab result, we will notify you by phone as soon as possible.  Submit refill requests through [employer] or call your pharmacy and they will forward the refill request to us. Please allow 3 business days for your refill to be completed.          Additional Information About Your Visit        MyChart Information     [employer] gives you secure access to your electronic health record. If you see a primary care provider, you can also send messages to your care team and make appointments. If you have questions, please call your primary care clinic.  If you do not have a primary care provider, please call [phone] and they will assist you.        Care EveryWhere ID     This is your Care EveryWhere ID. This could be used by other organizations to access your [facility] medical records  [id number]        Your Vitals Were     Pulse Last Period BMI (Body Mass Index)             88 02/09/2018 20.06 kg/m2          Blood Pressure from Last 3 Encounters:   06/01/18 90/58   05/14/18 98/58   04/17/18 111/70    Weight from Last 3 Encounters:   06/01/18 116 lb 12 oz (53 kg)   05/14/18 115 lb (52.2 kg)   04/17/18 114 lb (51.7 kg)              Today, you had the following     No orders found for display       Primary Care Provider Office Phone # Fax #    Kristen M Kehr, PA-C [phone] [address] [address] [facility] [address]         Equal Access to Services     [facility] : Hadii kallie Osorio, waomarda marcusmichelleha, qamartínezrigoberto rao. So [facility] [phone].    ATENCIÓN: Si habla español, tiene a heredia disposición servicios gratuitos de asistencia lingüística. Llame al [phone].    We comply with applicable federal civil rights laws and [place] laws. We do not discriminate on the basis of race, color, national origin, age, disability, sex, sexual orientation, or gender identity.            Thank you!     Thank you for choosing [facility]  for your care. Our goal is always to provide you with excellent care. Hearing back from our patients is one way we can continue to improve our services. Please take a few minutes to complete the written survey that you may receive in the mail after your visit with us. Thank you!             Your Updated Medication List - Protect others around you: Learn how to safely use, store and throw away your medicines at www.disposemymeds.org.          This list is accurate as of 6/1/18  4:40 PM.  Always use your most recent med list.                   Brand Name Dispense Instructions for use Diagnosis    DHA PO           levothyroxine 50 MCG tablet    SYNTHROID/LEVOTHROID    90 tablet    TAKE 1 TABLET BY MOUTH EVERY DAY    Hypothyroidism, unspecified type       prenatal multivitamin plus iron 27-0.8 MG Tabs per tablet      Take 1 tablet by mouth daily

## 2018-06-12 ENCOUNTER — PRENATAL OFFICE VISIT (OUTPATIENT)
Dept: OBGYN | Facility: CLINIC | Age: 33
End: 2018-06-12
Payer: COMMERCIAL

## 2018-06-12 VITALS
BODY MASS INDEX: 20.58 KG/M2 | WEIGHT: 119.8 LBS | SYSTOLIC BLOOD PRESSURE: 97 MMHG | OXYGEN SATURATION: 100 % | DIASTOLIC BLOOD PRESSURE: 59 MMHG | HEART RATE: 75 BPM

## 2018-06-12 DIAGNOSIS — Z87.59 S/P ECTOPIC PREGNANCY: ICD-10-CM

## 2018-06-12 DIAGNOSIS — E03.9 HYPOTHYROIDISM, UNSPECIFIED TYPE: ICD-10-CM

## 2018-06-12 DIAGNOSIS — Z34.92 NORMAL PREGNANCY IN SECOND TRIMESTER: Primary | ICD-10-CM

## 2018-06-12 PROCEDURE — 36415 COLL VENOUS BLD VENIPUNCTURE: CPT | Performed by: OBSTETRICS & GYNECOLOGY

## 2018-06-12 PROCEDURE — 99207 ZZC PRENATAL VISIT: CPT | Performed by: OBSTETRICS & GYNECOLOGY

## 2018-06-12 PROCEDURE — 82105 ALPHA-FETOPROTEIN SERUM: CPT | Mod: 90 | Performed by: OBSTETRICS & GYNECOLOGY

## 2018-06-12 PROCEDURE — 99000 SPECIMEN HANDLING OFFICE-LAB: CPT | Performed by: OBSTETRICS & GYNECOLOGY

## 2018-06-12 NOTE — MR AVS SNAPSHOT
After Visit Summary   6/12/2018    Lalitha Cortez    MRN: [id number]           Patient Information     Date Of Birth          1985        Visit Information        Provider Department      6/12/2018 8:45 AM Tatianna Ch DO [facility] [place]        Today's Diagnoses     Normal pregnancy in second trimester    -  1    S/P ectopic pregnancy        Hypothyroidism, unspecified type          Care Instructions    Prenatal Care  What is prenatal care?   Prenatal care is the care you receive when you are pregnant. It includes care given by your healthcare provider, support from your family, and an extra focus on giving yourself the care you need during this special time. Prenatal care improves your chances for a healthy pregnancy and healthy baby.   When should I see my healthcare provider?   Good care during pregnancy includes regularly scheduled prenatal exams.   If you are not yet pregnant but planning to get pregnant in the next few months, see your provider. Your provider may do some tests and talk about things you can do to have a healthy pregnancy and healthy baby.   You should schedule your first prenatal visit with your provider as soon as you think or know you are pregnant. Depending on your health and health history, your provider will probably schedule visits at least once a month for the first 6 months. During the 7th and 8th months you will see your provider every 2 weeks. During the last month you will probably see your provider once a week until you deliver your baby. If your pregnancy is high risk, your provider will probably want to see you more often. In some cases your provider may refer you to a medical specialist for more help with special needs, such as diabetes.   At each visit your healthcare provider will check to make sure that you and the baby are healthy. Regular visits can help you and your provider prevent possible problems. They can also help your provider  find and treat any problems early. In addition to meeting your medical needs, your provider advise you about caring for yourself. You will talk about how to have a healthy diet and get plenty of exercise and rest. Your provider can also help you deal with the emotional changes that can happen during pregnancy.   What will happen at the first prenatal visit?   At your first visit, your provider will ask about your personal medical history. He or she will also ask about the baby's father and your family health history. This information can help give your provider an idea of any problems you might have during your pregnancy. You will have a physical exam, including checks of your height, weight, and blood pressure and a pelvic exam. You will have a Pap test, urine tests, blood tests, and cultures of the cervix and vagina to check for infection.   Your provider will calculate your due date and the age of your baby. If your periods were regular before you got pregnant, and you are sure of the day when your last period started, your due date will be estimated to be 40 weeks from that day.   Your provider will talk to you about how to stay healthy during your pregnancy.   What will happen at other prenatal visits?   Your provider will check how you are doing and how the baby is developing. He or she will discuss how you are feeling, ask if you have any problems, and answer your questions.   During each prenatal visit your provider will:   weigh you   take your blood pressure   check your urine for sugar, protein, or bacteria   check your face, hands, ankles, and feet for swelling   listen to the baby's heartbeat   measure the size of the uterus to check the baby's growth.   At different times during the pregnancy, other exams and tests may be done. Some are routine and others are done only when a problem is suspected or you have a risk factor for a problem. Examples of other tests you might have are:   tests to check for  genetic problems and some birth defects, such as:   chorionic villus sampling of cells from the placenta   amniocentesis to test the fluid around the baby   blood tests called triple or quad screens   ultrasound scans to check the baby's growth, development, and health and to look at your uterus, the amniotic sac, and the placenta   blood tests to check for diabetes   electronic monitoring to check the health of the baby.   How can I take care of myself during my pregnancy?   Here are some things you can do to take good care of yourself during your pregnancy and prepare for the birth of your child:   Keep all appointments with your healthcare provider. Use these visits to discuss your pregnancy concerns or problems. Write down questions before each visit so that you will not forget about things you want to talk about.   Eat healthy meals that include whole grains, fresh fruits and vegetables, and calcium-rich foods, such as milk, cheese, and yogurt. Choose foods low in saturated fat. Do not eat uncooked or undercooked meats or fish.   Avoid certain fish with high levels of mercury. These fish include shark, melonie mackerel, swordfish, and tilefish. Do not eat more than 12 ounces of fish per week, or no more than 6 ounces of tuna fish per week. Because albacore tuna fish is also high in mercury, choose light tuna.   Drink plenty of water each day.   Take vitamins, other supplements, and medicines as recommended by your provider.   Unless your healthcare provider tells you not to, try to be physically active for at least 30 minutes a day, most days of the week. If you are pressed for time, you might find it easier to exercise 10 minutes at a time, 3 times a day. Consider taking a prenatal exercise class.   Do not smoke, do not drink alcohol, and do not take illegal drugs.   Talk to your healthcare provider before you take any medicine, including nonprescription and herbal medicines. Some medicines are not safe during  pregnancy.   Avoid hot tubs or saunas.   If you have cats in your home, do not empty the cat litter while you are pregnant. It may contain a parasite that causes an infection called toxoplasmosis, which can cause birth defects. Also, use gloves when you work in garden areas used by cats.   Stay away from toxic chemicals like insecticides, solvents (such as some  or paint thinners), lead, and mercury. Check labels on household products. Most dangerous products have pregnancy warnings on their labels. Ask your healthcare provider about products if you are unsure.   Relax by taking breaks from work or chores.   Help reduce stress by sharing your feelings with others.   Report any violence or other types of abuse in your home.   Learn more about pregnancy, labor, and delivery. Read books, watch videos, go to a childbirth class, and talk with experienced moms.   Plan for the lifestyle changes a new baby will bring. Prepare for possible changes in your budget, work situation, daily schedule, and relationships with family and friends.   Talk to your provider about the pros and cons of breast-feeding.   Before and during your pregnancy, try to do everything you can to keep yourself and your baby healthy during your pregnancy.     Published by [employer].  This content is reviewed periodically and is subject to change as new health information becomes available. The information is intended to inform and educate and is not a replacement for medical evaluation, advice, diagnosis or treatment by a healthcare professional.   Developed by [employer].   ? 2010 [facility] and/or its affiliates. All Rights Reserved.   Copyright   Clinical Reference Systems 2011              Follow-ups after your visit        Follow-up notes from your care team     Return in about 4 weeks (around 7/10/2018).      Your next 10 appointments already scheduled     Jul 10, 2018  8:30 AM YAMILETT   ESTABLISHED PRENATAL with Tatianna Ch DO    [facility] ([facility])    [address] [address] [place] [id number] [phone]              Who to contact     If you have questions or need follow up information about today's clinic visit or your schedule please contact [facility] directly at [phone].  Normal or non-critical lab and imaging results will be communicated to you by MyChart, letter or phone within 4 business days after the clinic has received the results. If you do not hear from us within 7 days, please contact the clinic through MyChart or phone. If you have a critical or abnormal lab result, we will notify you by phone as soon as possible.  Submit refill requests through [employer] or call your pharmacy and they will forward the refill request to us. Please allow 3 business days for your refill to be completed.          Additional Information About Your Visit        [employer] Information     [employer] gives you secure access to your electronic health record. If you see a primary care provider, you can also send messages to your care team and make appointments. If you have questions, please call your primary care clinic.  If you do not have a primary care provider, please call [phone] and they will assist you.        Care EveryWhere ID     This is your Care EveryWhere ID. This could be used by other organizations to access your [place] medical records  [id number]        Your Vitals Were     Pulse Last Period Pulse Oximetry BMI (Body Mass Index)          75 02/09/2018 100% 20.58 kg/m2         Blood Pressure from Last 3 Encounters:   06/12/18 97/59   06/01/18 90/58   05/14/18 98/58    Weight from Last 3 Encounters:   06/12/18 119 lb 12.8 oz (54.3 kg)   06/01/18 116 lb 12 oz (53 kg)   05/14/18 115 lb (52.2 kg)              We Performed the Following     Alpha fetoprotein maternal screen        Primary Care Provider Office Phone # Fax #    Kristen M Kehr, PA-C [phone]  [phone]       [id number] [facility] [address]        Equal Access to Services     MARILYN HOUSER : Hadii aad ku hadralfervin Souravali, waomarda luajaymichelleha, qaybta kawendida jacquesfaustoarielle, rigoberto merinomicaelachristopher yu. So [facility] [phone].    ATENCIÓN: Si habla español, tiene a heredia disposición servicios gratuitos de asistencia lingüística. Llame al [phone].    We comply with applicable federal civil rights laws and [place] laws. We do not discriminate on the basis of race, color, national origin, age, disability, sex, sexual orientation, or gender identity.            Thank you!     Thank you for choosing [facility]  for your care. Our goal is always to provide you with excellent care. Hearing back from our patients is one way we can continue to improve our services. Please take a few minutes to complete the written survey that you may receive in the mail after your visit with us. Thank you!             Your Updated Medication List - Protect others around you: Learn how to safely use, store and throw away your medicines at www.disposemymeds.org.          This list is accurate as of 6/12/18  9:05 AM.  Always use your most recent med list.                   Brand Name Dispense Instructions for use Diagnosis    DHA PO           levothyroxine 50 MCG tablet    SYNTHROID/LEVOTHROID    90 tablet    TAKE 1 TABLET BY MOUTH EVERY DAY    Hypothyroidism, unspecified type       prenatal multivitamin plus iron 27-0.8 MG Tabs per tablet      Take 1 tablet by mouth daily

## 2018-06-12 NOTE — PATIENT INSTRUCTIONS
Prenatal Care  What is prenatal care?   Prenatal care is the care you receive when you are pregnant. It includes care given by your healthcare provider, support from your family, and an extra focus on giving yourself the care you need during this special time. Prenatal care improves your chances for a healthy pregnancy and healthy baby.   When should I see my healthcare provider?   Good care during pregnancy includes regularly scheduled prenatal exams.   If you are not yet pregnant but planning to get pregnant in the next few months, see your provider. Your provider may do some tests and talk about things you can do to have a healthy pregnancy and healthy baby.   You should schedule your first prenatal visit with your provider as soon as you think or know you are pregnant. Depending on your health and health history, your provider will probably schedule visits at least once a month for the first 6 months. During the 7th and 8th months you will see your provider every 2 weeks. During the last month you will probably see your provider once a week until you deliver your baby. If your pregnancy is high risk, your provider will probably want to see you more often. In some cases your provider may refer you to a medical specialist for more help with special needs, such as diabetes.   At each visit your healthcare provider will check to make sure that you and the baby are healthy. Regular visits can help you and your provider prevent possible problems. They can also help your provider find and treat any problems early. In addition to meeting your medical needs, your provider advise you about caring for yourself. You will talk about how to have a healthy diet and get plenty of exercise and rest. Your provider can also help you deal with the emotional changes that can happen during pregnancy.   What will happen at the first prenatal visit?   At your first visit, your provider will ask about your personal medical history. He  or she will also ask about the baby's father and your family health history. This information can help give your provider an idea of any problems you might have during your pregnancy. You will have a physical exam, including checks of your height, weight, and blood pressure and a pelvic exam. You will have a Pap test, urine tests, blood tests, and cultures of the cervix and vagina to check for infection.   Your provider will calculate your due date and the age of your baby. If your periods were regular before you got pregnant, and you are sure of the day when your last period started, your due date will be estimated to be 40 weeks from that day.   Your provider will talk to you about how to stay healthy during your pregnancy.   What will happen at other prenatal visits?   Your provider will check how you are doing and how the baby is developing. He or she will discuss how you are feeling, ask if you have any problems, and answer your questions.   During each prenatal visit your provider will:   weigh you   take your blood pressure   check your urine for sugar, protein, or bacteria   check your face, hands, ankles, and feet for swelling   listen to the baby's heartbeat   measure the size of the uterus to check the baby's growth.   At different times during the pregnancy, other exams and tests may be done. Some are routine and others are done only when a problem is suspected or you have a risk factor for a problem. Examples of other tests you might have are:   tests to check for genetic problems and some birth defects, such as:   chorionic villus sampling of cells from the placenta   amniocentesis to test the fluid around the baby   blood tests called triple or quad screens   ultrasound scans to check the baby's growth, development, and health and to look at your uterus, the amniotic sac, and the placenta   blood tests to check for diabetes   electronic monitoring to check the health of the baby.   How can I take care  of myself during my pregnancy?   Here are some things you can do to take good care of yourself during your pregnancy and prepare for the birth of your child:   Keep all appointments with your healthcare provider. Use these visits to discuss your pregnancy concerns or problems. Write down questions before each visit so that you will not forget about things you want to talk about.   Eat healthy meals that include whole grains, fresh fruits and vegetables, and calcium-rich foods, such as milk, cheese, and yogurt. Choose foods low in saturated fat. Do not eat uncooked or undercooked meats or fish.   Avoid certain fish with high levels of mercury. These fish include shark, melonie mackerel, swordfish, and tilefish. Do not eat more than 12 ounces of fish per week, or no more than 6 ounces of tuna fish per week. Because albacore tuna fish is also high in mercury, choose light tuna.   Drink plenty of water each day.   Take vitamins, other supplements, and medicines as recommended by your provider.   Unless your healthcare provider tells you not to, try to be physically active for at least 30 minutes a day, most days of the week. If you are pressed for time, you might find it easier to exercise 10 minutes at a time, 3 times a day. Consider taking a prenatal exercise class.   Do not smoke, do not drink alcohol, and do not take illegal drugs.   Talk to your healthcare provider before you take any medicine, including nonprescription and herbal medicines. Some medicines are not safe during pregnancy.   Avoid hot tubs or saunas.   If you have cats in your home, do not empty the cat litter while you are pregnant. It may contain a parasite that causes an infection called toxoplasmosis, which can cause birth defects. Also, use gloves when you work in garden areas used by cats.   Stay away from toxic chemicals like insecticides, solvents (such as some  or paint thinners), lead, and mercury. Check labels on household products.  Most dangerous products have pregnancy warnings on their labels. Ask your healthcare provider about products if you are unsure.   Relax by taking breaks from work or chores.   Help reduce stress by sharing your feelings with others.   Report any violence or other types of abuse in your home.   Learn more about pregnancy, labor, and delivery. Read books, watch videos, go to a childbirth class, and talk with experienced moms.   Plan for the lifestyle changes a new baby will bring. Prepare for possible changes in your budget, work situation, daily schedule, and relationships with family and friends.   Talk to your provider about the pros and cons of breast-feeding.   Before and during your pregnancy, try to do everything you can to keep yourself and your baby healthy during your pregnancy.     Published by [employer].  This content is reviewed periodically and is subject to change as new health information becomes available. The information is intended to inform and educate and is not a replacement for medical evaluation, advice, diagnosis or treatment by a healthcare professional.   Developed by [employer].   ? 2010 [employer] and/or its affiliates. All Rights Reserved.   Copyright   Clinical Reference Systems 2011

## 2018-06-12 NOTE — PROGRESS NOTES
33 year old  at 16w5d weeks presents to the clinic for a routine prenatal visit.  Feeling well.  No vaginal bleeding, leakage of fluid, or contractions   Fundal height=s=d  UUMj=196's  Discussed quad screen and she will do the AFP-already had first trimester screening done  Anatomy ultrasound=  Hypothyroidism  RTC 4 weeks.    Tatianna Ch

## 2018-06-13 LAB
# FETUSES US: NORMAL
# FETUSES: NORMAL
AFP ADJ MOM AMN: 1.22
AFP SERPL-MCNC: 51 NG/ML
AGE - REPORTED: 31.5 YR
CURRENT SMOKER: NO
CURRENT SMOKER: NO
DIABETES STATUS PATIENT: NO
FAMILY MEMBER DISEASES HX: NO
FAMILY MEMBER DISEASES HX: NO
GA METHOD: NORMAL
GA METHOD: NORMAL
GA: NORMAL WK
IDDM PATIENT QL: NO
INTEGRATED SCN PATIENT-IMP: NORMAL
LMP START DATE: NORMAL
MONOCHORIONIC TWINS: NO
SERVICE CMNT-IMP: NO
SPECIMEN DRAWN SERPL: NORMAL
VALPROIC/CARBAMAZEPINE STATUS: NO
WEIGHT UNITS: NORMAL

## 2018-06-29 ENCOUNTER — TRANSFERRED RECORDS (OUTPATIENT)
Dept: HEALTH INFORMATION MANAGEMENT | Facility: CLINIC | Age: 33
End: 2018-06-29

## 2018-07-01 PROBLEM — O44.40 LOW LYING PLACENTA, ANTEPARTUM: Status: ACTIVE | Noted: 2018-07-01

## 2018-07-09 ENCOUNTER — PRENATAL OFFICE VISIT (OUTPATIENT)
Dept: OBGYN | Facility: OTHER | Age: 33
End: 2018-07-09
Payer: COMMERCIAL

## 2018-07-09 VITALS
DIASTOLIC BLOOD PRESSURE: 70 MMHG | HEART RATE: 76 BPM | SYSTOLIC BLOOD PRESSURE: 102 MMHG | BODY MASS INDEX: 21.09 KG/M2 | WEIGHT: 122.75 LBS

## 2018-07-09 DIAGNOSIS — O44.40 LOW-LYING PLACENTA: ICD-10-CM

## 2018-07-09 DIAGNOSIS — Z34.92 NORMAL PREGNANCY IN SECOND TRIMESTER: Primary | ICD-10-CM

## 2018-07-09 PROCEDURE — 99207 ZZC PRENATAL VISIT: CPT | Performed by: OBSTETRICS & GYNECOLOGY

## 2018-07-09 NOTE — PATIENT INSTRUCTIONS
Prenatal Care  What is prenatal care?   Prenatal care is the care you receive when you are pregnant. It includes care given by your healthcare provider, support from your family, and an extra focus on giving yourself the care you need during this special time. Prenatal care improves your chances for a healthy pregnancy and healthy baby.   When should I see my healthcare provider?   Good care during pregnancy includes regularly scheduled prenatal exams.   If you are not yet pregnant but planning to get pregnant in the next few months, see your provider. Your provider may do some tests and talk about things you can do to have a healthy pregnancy and healthy baby.   You should schedule your first prenatal visit with your provider as soon as you think or know you are pregnant. Depending on your health and health history, your provider will probably schedule visits at least once a month for the first 6 months. During the 7th and 8th months you will see your provider every 2 weeks. During the last month you will probably see your provider once a week until you deliver your baby. If your pregnancy is high risk, your provider will probably want to see you more often. In some cases your provider may refer you to a medical specialist for more help with special needs, such as diabetes.   At each visit your healthcare provider will check to make sure that you and the baby are healthy. Regular visits can help you and your provider prevent possible problems. They can also help your provider find and treat any problems early. In addition to meeting your medical needs, your provider advise you about caring for yourself. You will talk about how to have a healthy diet and get plenty of exercise and rest. Your provider can also help you deal with the emotional changes that can happen during pregnancy.   What will happen at the first prenatal visit?   At your first visit, your provider will ask about your personal medical history. He  or she will also ask about the baby's father and your family health history. This information can help give your provider an idea of any problems you might have during your pregnancy. You will have a physical exam, including checks of your height, weight, and blood pressure and a pelvic exam. You will have a Pap test, urine tests, blood tests, and cultures of the cervix and vagina to check for infection.   Your provider will calculate your due date and the age of your baby. If your periods were regular before you got pregnant, and you are sure of the day when your last period started, your due date will be estimated to be 40 weeks from that day.   Your provider will talk to you about how to stay healthy during your pregnancy.   What will happen at other prenatal visits?   Your provider will check how you are doing and how the baby is developing. He or she will discuss how you are feeling, ask if you have any problems, and answer your questions.   During each prenatal visit your provider will:   weigh you   take your blood pressure   check your urine for sugar, protein, or bacteria   check your face, hands, ankles, and feet for swelling   listen to the baby's heartbeat   measure the size of the uterus to check the baby's growth.   At different times during the pregnancy, other exams and tests may be done. Some are routine and others are done only when a problem is suspected or you have a risk factor for a problem. Examples of other tests you might have are:   tests to check for genetic problems and some birth defects, such as:   chorionic villus sampling of cells from the placenta   amniocentesis to test the fluid around the baby   blood tests called triple or quad screens   ultrasound scans to check the baby's growth, development, and health and to look at your uterus, the amniotic sac, and the placenta   blood tests to check for diabetes   electronic monitoring to check the health of the baby.   How can I take care  of myself during my pregnancy?   Here are some things you can do to take good care of yourself during your pregnancy and prepare for the birth of your child:   Keep all appointments with your healthcare provider. Use these visits to discuss your pregnancy concerns or problems. Write down questions before each visit so that you will not forget about things you want to talk about.   Eat healthy meals that include whole grains, fresh fruits and vegetables, and calcium-rich foods, such as milk, cheese, and yogurt. Choose foods low in saturated fat. Do not eat uncooked or undercooked meats or fish.   Avoid certain fish with high levels of mercury. These fish include shark, melonie mackerel, swordfish, and tilefish. Do not eat more than 12 ounces of fish per week, or no more than 6 ounces of tuna fish per week. Because albacore tuna fish is also high in mercury, choose light tuna.   Drink plenty of water each day.   Take vitamins, other supplements, and medicines as recommended by your provider.   Unless your healthcare provider tells you not to, try to be physically active for at least 30 minutes a day, most days of the week. If you are pressed for time, you might find it easier to exercise 10 minutes at a time, 3 times a day. Consider taking a prenatal exercise class.   Do not smoke, do not drink alcohol, and do not take illegal drugs.   Talk to your healthcare provider before you take any medicine, including nonprescription and herbal medicines. Some medicines are not safe during pregnancy.   Avoid hot tubs or saunas.   If you have cats in your home, do not empty the cat litter while you are pregnant. It may contain a parasite that causes an infection called toxoplasmosis, which can cause birth defects. Also, use gloves when you work in garden areas used by cats.   Stay away from toxic chemicals like insecticides, solvents (such as some  or paint thinners), lead, and mercury. Check labels on household products.  Most dangerous products have pregnancy warnings on their labels. Ask your healthcare provider about products if you are unsure.   Relax by taking breaks from work or chores.   Help reduce stress by sharing your feelings with others.   Report any violence or other types of abuse in your home.   Learn more about pregnancy, labor, and delivery. Read books, watch videos, go to a childbirth class, and talk with experienced moms.   Plan for the lifestyle changes a new baby will bring. Prepare for possible changes in your budget, work situation, daily schedule, and relationships with family and friends.   Talk to your provider about the pros and cons of breast-feeding.   Before and during your pregnancy, try to do everything you can to keep yourself and your baby healthy during your pregnancy.     Published by [employer].  This content is reviewed periodically and is subject to change as new health information becomes available. The information is intended to inform and educate and is not a replacement for medical evaluation, advice, diagnosis or treatment by a healthcare professional.   Developed by [employer].   ? 2010 [employer] and/or its affiliates. All Rights Reserved.   Copyright   [employer] Reference Systems 2011

## 2018-07-09 NOTE — MR AVS SNAPSHOT
After Visit Summary   7/9/2018    Lalitha Cortez    MRN: [id number]           Patient Information     Date Of Birth          1985        Visit Information        Provider Department      7/9/2018 11:45 AM Tatianna Ch, DO [facility]        Today's Diagnoses     Normal pregnancy in second trimester    -  1    Low-lying placenta          Care Instructions    Prenatal Care  What is prenatal care?   Prenatal care is the care you receive when you are pregnant. It includes care given by your healthcare provider, support from your family, and an extra focus on giving yourself the care you need during this special time. Prenatal care improves your chances for a healthy pregnancy and healthy baby.   When should I see my healthcare provider?   Good care during pregnancy includes regularly scheduled prenatal exams.   If you are not yet pregnant but planning to get pregnant in the next few months, see your provider. Your provider may do some tests and talk about things you can do to have a healthy pregnancy and healthy baby.   You should schedule your first prenatal visit with your provider as soon as you think or know you are pregnant. Depending on your health and health history, your provider will probably schedule visits at least once a month for the first 6 months. During the 7th and 8th months you will see your provider every 2 weeks. During the last month you will probably see your provider once a week until you deliver your baby. If your pregnancy is high risk, your provider will probably want to see you more often. In some cases your provider may refer you to a medical specialist for more help with special needs, such as diabetes.   At each visit your healthcare provider will check to make sure that you and the baby are healthy. Regular visits can help you and your provider prevent possible problems. They can also help your provider find and treat any problems early. In addition  to meeting your medical needs, your provider advise you about caring for yourself. You will talk about how to have a healthy diet and get plenty of exercise and rest. Your provider can also help you deal with the emotional changes that can happen during pregnancy.   What will happen at the first prenatal visit?   At your first visit, your provider will ask about your personal medical history. He or she will also ask about the baby's father and your family health history. This information can help give your provider an idea of any problems you might have during your pregnancy. You will have a physical exam, including checks of your height, weight, and blood pressure and a pelvic exam. You will have a Pap test, urine tests, blood tests, and cultures of the cervix and vagina to check for infection.   Your provider will calculate your due date and the age of your baby. If your periods were regular before you got pregnant, and you are sure of the day when your last period started, your due date will be estimated to be 40 weeks from that day.   Your provider will talk to you about how to stay healthy during your pregnancy.   What will happen at other prenatal visits?   Your provider will check how you are doing and how the baby is developing. He or she will discuss how you are feeling, ask if you have any problems, and answer your questions.   During each prenatal visit your provider will:   weigh you   take your blood pressure   check your urine for sugar, protein, or bacteria   check your face, hands, ankles, and feet for swelling   listen to the baby's heartbeat   measure the size of the uterus to check the baby's growth.   At different times during the pregnancy, other exams and tests may be done. Some are routine and others are done only when a problem is suspected or you have a risk factor for a problem. Examples of other tests you might have are:   tests to check for genetic problems and some birth defects, such  as:   chorionic villus sampling of cells from the placenta   amniocentesis to test the fluid around the baby   blood tests called triple or quad screens   ultrasound scans to check the baby's growth, development, and health and to look at your uterus, the amniotic sac, and the placenta   blood tests to check for diabetes   electronic monitoring to check the health of the baby.   How can I take care of myself during my pregnancy?   Here are some things you can do to take good care of yourself during your pregnancy and prepare for the birth of your child:   Keep all appointments with your healthcare provider. Use these visits to discuss your pregnancy concerns or problems. Write down questions before each visit so that you will not forget about things you want to talk about.   Eat healthy meals that include whole grains, fresh fruits and vegetables, and calcium-rich foods, such as milk, cheese, and yogurt. Choose foods low in saturated fat. Do not eat uncooked or undercooked meats or fish.   Avoid certain fish with high levels of mercury. These fish include shark, melonie mackerel, swordfish, and tilefish. Do not eat more than 12 ounces of fish per week, or no more than 6 ounces of tuna fish per week. Because albacore tuna fish is also high in mercury, choose light tuna.   Drink plenty of water each day.   Take vitamins, other supplements, and medicines as recommended by your provider.   Unless your healthcare provider tells you not to, try to be physically active for at least 30 minutes a day, most days of the week. If you are pressed for time, you might find it easier to exercise 10 minutes at a time, 3 times a day. Consider taking a prenatal exercise class.   Do not smoke, do not drink alcohol, and do not take illegal drugs.   Talk to your healthcare provider before you take any medicine, including nonprescription and herbal medicines. Some medicines are not safe during pregnancy.   Avoid hot tubs or saunas.   If you  have cats in your home, do not empty the cat litter while you are pregnant. It may contain a parasite that causes an infection called toxoplasmosis, which can cause birth defects. Also, use gloves when you work in garden areas used by cats.   Stay away from toxic chemicals like insecticides, solvents (such as some  or paint thinners), lead, and mercury. Check labels on household products. Most dangerous products have pregnancy warnings on their labels. Ask your healthcare provider about products if you are unsure.   Relax by taking breaks from work or chores.   Help reduce stress by sharing your feelings with others.   Report any violence or other types of abuse in your home.   Learn more about pregnancy, labor, and delivery. Read books, watch videos, go to a childbirth class, and talk with experienced moms.   Plan for the lifestyle changes a new baby will bring. Prepare for possible changes in your budget, work situation, daily schedule, and relationships with family and friends.   Talk to your provider about the pros and cons of breast-feeding.   Before and during your pregnancy, try to do everything you can to keep yourself and your baby healthy during your pregnancy.     Published by [employer].  This content is reviewed periodically and is subject to change as new health information becomes available. The information is intended to inform and educate and is not a replacement for medical evaluation, advice, diagnosis or treatment by a healthcare professional.   Developed by [employer].   ? 2010 [employer] and/or its affiliates. All Rights Reserved.   Copyright   Clinical Reference Systems 2011              Follow-ups after your visit        Follow-up notes from your care team     Return in about 4 weeks (around 8/6/2018).      Future tests that were ordered for you today     Open Future Orders        Priority Expected Expires Ordered    Glucose tolerance gest screen 1 hour Routine  8/31/2018 7/9/2018     OB hemoglobin Routine  8/31/2018 7/9/2018    TSH Routine  8/31/2018 7/9/2018    US OB Single Follow Up Repeat Routine  8/10/2018 7/9/2018            Who to contact     If you have questions or need follow up information about today's clinic visit or your schedule please contact [facility] directly at [phone].  Normal or non-critical lab and imaging results will be communicated to you by MyChart, letter or phone within 4 business days after the clinic has received the results. If you do not hear from us within 7 days, please contact the clinic through [employer] or phone. If you have a critical or abnormal lab result, we will notify you by phone as soon as possible.  Submit refill requests through [employer] or call your pharmacy and they will forward the refill request to us. Please allow 3 business days for your refill to be completed.          Additional Information About Your Visit        [employer] Information     [employer] gives you secure access to your electronic health record. If you see a primary care provider, you can also send messages to your care team and make appointments. If you have questions, please call your primary care clinic.  If you do not have a primary care provider, please call [phone] and they will assist you.        Care EveryWhere ID     This is your Care EveryWhere ID. This could be used by other organizations to access your [place] medical records  [id number]        Your Vitals Were     Pulse Last Period BMI (Body Mass Index)             76 02/09/2018 21.09 kg/m2          Blood Pressure from Last 3 Encounters:   07/09/18 102/70   06/12/18 97/59   06/01/18 90/58    Weight from Last 3 Encounters:   07/09/18 122 lb 12 oz (55.7 kg)   06/12/18 119 lb 12.8 oz (54.3 kg)   06/01/18 116 lb 12 oz (53 kg)               Primary Care Provider Office Phone # Fax #    Kristen M Kehr, PA-C [phone] [address] [facility] [id number]        Equal Access to  Services     [facility]: Hadii kallie Osorio, waomarda luqadaha, qaybta kawendirigoberto stanley. So [facility] [phone].    ATENCIÓN: Si habla español, tiene a heredia disposición servicios gratuitos de asistencia lingüística. Llame al [phone].    We comply with applicable federal civil rights laws and [place] laws. We do not discriminate on the basis of race, color, national origin, age, disability, sex, sexual orientation, or gender identity.            Thank you!     Thank you for choosing [facility]  for your care. Our goal is always to provide you with excellent care. Hearing back from our patients is one way we can continue to improve our services. Please take a few minutes to complete the written survey that you may receive in the mail after your visit with us. Thank you!             Your Updated Medication List - Protect others around you: Learn how to safely use, store and throw away your medicines at www.disposemymeds.org.          This list is accurate as of 7/9/18  1:05 PM.  Always use your most recent med list.                   Brand Name Dispense Instructions for use Diagnosis    DHA PO           levothyroxine 50 MCG tablet    SYNTHROID/LEVOTHROID    90 tablet    TAKE 1 TABLET BY MOUTH EVERY DAY    Hypothyroidism, unspecified type       prenatal multivitamin plus iron 27-0.8 MG Tabs per tablet      Take 1 tablet by mouth daily

## 2018-07-09 NOTE — PROGRESS NOTES
33 year old  at 20w4d weeks presents to the clinic for a routine prenatal visit.  Low lying placenta=repeat ultrasound in 4 weeks  No leakage of fluid, vaginal bleeding, or contractions   Good fetal movement.  FHTs: 156  Fundal height: s=d  Normal anatomy ultrasound  Hypothyroidism=stable in April.  Will check at next appointment  Anxiety=stable  RTC 4 weeks    Tatianna Ch

## 2018-07-31 ENCOUNTER — PRENATAL OFFICE VISIT (OUTPATIENT)
Dept: OBGYN | Facility: CLINIC | Age: 33
End: 2018-07-31
Payer: COMMERCIAL

## 2018-07-31 ENCOUNTER — RADIANT APPOINTMENT (OUTPATIENT)
Dept: ULTRASOUND IMAGING | Facility: CLINIC | Age: 33
End: 2018-07-31
Attending: OBSTETRICS & GYNECOLOGY
Payer: COMMERCIAL

## 2018-07-31 VITALS
OXYGEN SATURATION: 99 % | HEART RATE: 71 BPM | DIASTOLIC BLOOD PRESSURE: 51 MMHG | WEIGHT: 124.5 LBS | BODY MASS INDEX: 21.39 KG/M2 | SYSTOLIC BLOOD PRESSURE: 91 MMHG

## 2018-07-31 DIAGNOSIS — O44.40 LOW-LYING PLACENTA: ICD-10-CM

## 2018-07-31 DIAGNOSIS — Z87.59 S/P ECTOPIC PREGNANCY: ICD-10-CM

## 2018-07-31 DIAGNOSIS — Z34.92 NORMAL PREGNANCY IN SECOND TRIMESTER: Primary | ICD-10-CM

## 2018-07-31 DIAGNOSIS — O44.40 LOW LYING PLACENTA, ANTEPARTUM: ICD-10-CM

## 2018-07-31 DIAGNOSIS — E03.9 HYPOTHYROIDISM, UNSPECIFIED TYPE: ICD-10-CM

## 2018-07-31 DIAGNOSIS — Z34.92 NORMAL PREGNANCY IN SECOND TRIMESTER: ICD-10-CM

## 2018-07-31 LAB
GLUCOSE 1H P 50 G GLC PO SERPL-MCNC: 124 MG/DL (ref 60–129)
HGB BLD-MCNC: 12.3 G/DL (ref 11.7–15.7)
TSH SERPL DL<=0.005 MIU/L-ACNC: 1.75 MU/L (ref 0.4–4)

## 2018-07-31 PROCEDURE — 36415 COLL VENOUS BLD VENIPUNCTURE: CPT | Performed by: OBSTETRICS & GYNECOLOGY

## 2018-07-31 PROCEDURE — 82950 GLUCOSE TEST: CPT | Performed by: OBSTETRICS & GYNECOLOGY

## 2018-07-31 PROCEDURE — 99207 ZZC PRENATAL VISIT: CPT | Performed by: OBSTETRICS & GYNECOLOGY

## 2018-07-31 PROCEDURE — 84443 ASSAY THYROID STIM HORMONE: CPT | Performed by: OBSTETRICS & GYNECOLOGY

## 2018-07-31 PROCEDURE — 00000218 ZZHCL STATISTIC OBHBG - HEMOGLOBIN: Performed by: OBSTETRICS & GYNECOLOGY

## 2018-07-31 PROCEDURE — 76816 OB US FOLLOW-UP PER FETUS: CPT

## 2018-07-31 NOTE — PROGRESS NOTES
33 year old  at 23w5d weeks presents to the clinic for a routine prenatal visit.  Low lying placenta=1.8cm at 20 week ultrasound.  Ultrasound today in process  No leakage of fluid, vaginal bleeding, or contractions   Good fetal movement.  FHTs: 140's  Fundal height: 23cm  Hypothyroidism=will check today  Normal anatomy ultrasound   RTC 4 weeks    Tatianna Ch

## 2018-07-31 NOTE — MR AVS SNAPSHOT
After Visit Summary   7/31/2018    Lalitha Cortez    MRN: [id number]           Patient Information     Date Of Birth          1985        Visit Information        Provider Department      7/31/2018 4:00 PM Tatianna Ch,  [facility]        Today's Diagnoses     Normal pregnancy in second trimester    -  1    Low lying placenta, antepartum        S/P ectopic pregnancy        Hypothyroidism, unspecified type          Care Instructions    What to watch out for are: regular contractions every 5 min, vaginal bleeding, decreased fetal movement, or leakage of fluid.  Please call the office or go to L&D if you develop any of these signs and symptoms.      I will see you for your follow up appointment.  Please feel free to call if you have any questions or concerns.      Thanks,  Tatianna Ch, DO            Follow-ups after your visit        Follow-up notes from your care team     Return in about 4 weeks (around 8/28/2018).      Who to contact     If you have questions or need follow up information about today's clinic visit or your schedule please contact [facility] directly at [phone].  Normal or non-critical lab and imaging results will be communicated to you by [email or web address], letter or phone within 4 business days after the clinic has received the results. If you do not hear from us within 7 days, please contact the clinic through [employer] or phone. If you have a critical or abnormal lab result, we will notify you by phone as soon as possible.  Submit refill requests through [employer] or call your pharmacy and they will forward the refill request to us. Please allow 3 business days for your refill to be completed.          Additional Information About Your Visit        [email or web address] Information     [employer] gives you secure access to your electronic health record. If you see a primary care provider, you can also send messages to your care team and make appointments.  If you have questions, please call your primary care clinic.  If you do not have a primary care provider, please call [phone] and they will assist you.        Care EveryWhere ID     This is your Care EveryWhere ID. This could be used by other organizations to access your [place] medical records  [id number]        Your Vitals Were     Pulse Last Period Pulse Oximetry BMI (Body Mass Index)          71 02/09/2018 99% 21.39 kg/m2         Blood Pressure from Last 3 Encounters:   07/31/18 91/51   07/09/18 102/70   06/12/18 97/59    Weight from Last 3 Encounters:   07/31/18 124 lb 8 oz (56.5 kg)   07/09/18 122 lb 12 oz (55.7 kg)   06/12/18 119 lb 12.8 oz (54.3 kg)              Today, you had the following     No orders found for display       Primary Care Provider Office Phone # Fax #    Kristen M Kehr, PA-C [phone] [address] [facility] [address]        Equal Access to Services     [facility]: Hadii aad ku hadasho Soomaali, waaxda luqadaha, qaybta kaalmada adeegyada, waxmaya akers . So [facility] [phone].    ATENCIÓN: Si habla español, tiene a heredia disposición servicios gratuitos de asistencia lingüística. Llame al [phone].    We comply with applicable federal civil rights laws and [place] laws. We do not discriminate on the basis of race, color, national origin, age, disability, sex, sexual orientation, or gender identity.            Thank you!     Thank you for choosing [facility]  for your care. Our goal is always to provide you with excellent care. Hearing back from our patients is one way we can continue to improve our services. Please take a few minutes to complete the written survey that you may receive in the mail after your visit with us. Thank you!             Your Updated Medication List - Protect others around you: Learn how to safely use, store and throw away your medicines at www.disposemymeds.org.          This list  is accurate as of 7/31/18  4:35 PM.  Always use your most recent med list.                   Brand Name Dispense Instructions for use Diagnosis    DHA PO           levothyroxine 50 MCG tablet    SYNTHROID/LEVOTHROID    90 tablet    TAKE 1 TABLET BY MOUTH EVERY DAY    Hypothyroidism, unspecified type       prenatal multivitamin plus iron 27-0.8 MG Tabs per tablet      Take 1 tablet by mouth daily

## 2018-07-31 NOTE — PATIENT INSTRUCTIONS
What to watch out for are: regular contractions every 5 min, vaginal bleeding, decreased fetal movement, or leakage of fluid.  Please call the office or go to L&D if you develop any of these signs and symptoms.      I will see you for your follow up appointment.  Please feel free to call if you have any questions or concerns.      Thanks,  Tatianna Ch, DO

## 2018-08-01 ENCOUNTER — MYC MEDICAL ADVICE (OUTPATIENT)
Dept: OBGYN | Facility: CLINIC | Age: 33
End: 2018-08-01

## 2018-08-01 ENCOUNTER — TELEPHONE (OUTPATIENT)
Dept: OBGYN | Facility: CLINIC | Age: 33
End: 2018-08-01

## 2018-08-02 PROBLEM — O44.40 LOW LYING PLACENTA, ANTEPARTUM: Status: RESOLVED | Noted: 2018-07-01 | Resolved: 2018-08-02

## 2018-08-28 ENCOUNTER — PRENATAL OFFICE VISIT (OUTPATIENT)
Dept: OBGYN | Facility: CLINIC | Age: 33
End: 2018-08-28
Payer: COMMERCIAL

## 2018-08-28 VITALS
BODY MASS INDEX: 21.95 KG/M2 | SYSTOLIC BLOOD PRESSURE: 94 MMHG | DIASTOLIC BLOOD PRESSURE: 63 MMHG | WEIGHT: 127.8 LBS | OXYGEN SATURATION: 100 % | HEART RATE: 87 BPM

## 2018-08-28 DIAGNOSIS — Z23 NEED FOR TDAP VACCINATION: ICD-10-CM

## 2018-08-28 DIAGNOSIS — Z87.59 S/P ECTOPIC PREGNANCY: ICD-10-CM

## 2018-08-28 DIAGNOSIS — Z34.92 NORMAL PREGNANCY IN SECOND TRIMESTER: Primary | ICD-10-CM

## 2018-08-28 DIAGNOSIS — E03.9 HYPOTHYROIDISM, UNSPECIFIED TYPE: ICD-10-CM

## 2018-08-28 PROCEDURE — 90471 IMMUNIZATION ADMIN: CPT | Performed by: OBSTETRICS & GYNECOLOGY

## 2018-08-28 PROCEDURE — 90715 TDAP VACCINE 7 YRS/> IM: CPT | Performed by: OBSTETRICS & GYNECOLOGY

## 2018-08-28 PROCEDURE — 99207 ZZC PRENATAL VISIT: CPT | Performed by: OBSTETRICS & GYNECOLOGY

## 2018-08-28 NOTE — PROGRESS NOTES
33 year old  at 27w5d weeks presents to the clinic for a routine prenatal visit.  No concerns.  No vaginal bleeding, leakage of fluid, or contractions   Good fetal movement.  PRCl=768's   Fundal height=26cm    Normal anatomy ultrasound  RTC 2 weeks  DYD=928  Hgb=12.3  GERD=stable  Hypothyroidism=1.75 on   TDAP today  Blood type:O+    Tatianna Ch

## 2018-08-28 NOTE — MR AVS SNAPSHOT
After Visit Summary   8/28/2018    Lalitha Cortez    MRN: [id number]           Patient Information     Date Of Birth          1985        Visit Information        Provider Department      8/28/2018 8:45 AM Tatianna Ch DO [facility]        Today's Diagnoses     Normal pregnancy in second trimester    -  1    Need for Tdap vaccination        S/P ectopic pregnancy        Hypothyroidism, unspecified type          Care Instructions    What to watch out for are: regular contractions every 5 min, vaginal bleeding, decreased fetal movement, or leakage of fluid.  Please call the office or go to L&D if you develop any of these signs and symptoms.      I will see you for your follow up appointment.  Please feel free to call if you have any questions or concerns.      Thanks,  Tatianna Ch, DO            Follow-ups after your visit        Follow-up notes from your care team     Return in about 2 weeks (around 9/11/2018).      Your next 10 appointments already scheduled     Sep 07, 2018  9:00 AM CDT   ESTABLISHED PRENATAL with Tatianna Ch DO   [facility] ([facility]    [address] [facility] [address]  [facility] [id number] [phone]              Who to contact     If you have questions or need follow up information about today's clinic visit or your schedule please contact [facility] directly at [phone].  Normal or non-critical lab and imaging results will be communicated to you by MyChart, letter or phone within 4 business days after the clinic has received the results. If you do not hear from us within 7 days, please contact the clinic through MyChart or phone. If you have a critical or abnormal lab result, we will notify you by phone as soon as possible.  Submit refill requests through [email or web address] or call your pharmacy and they will forward the refill request to us. Please allow 3 business days for your refill to be  completed.          Additional Information About Your Visit        DotSpotshart Information     [employer] gives you secure access to your electronic health record. If you see a primary care provider, you can also send messages to your care team and make appointments. If you have questions, please call your primary care clinic.  If you do not have a primary care provider, please call [phone] and they will assist you.        Care EveryWhere ID     This is your Care EveryWhere ID. This could be used by other organizations to access your [facility] medical records  [id number]        Your Vitals Were     Pulse Last Period Pulse Oximetry BMI (Body Mass Index)          87 02/09/2018 100% 21.95 kg/m2         Blood Pressure from Last 3 Encounters:   08/28/18 94/63   07/31/18 91/51   07/09/18 102/70    Weight from Last 3 Encounters:   08/28/18 127 lb 12.8 oz (58 kg)   07/31/18 124 lb 8 oz (56.5 kg)   07/09/18 122 lb 12 oz (55.7 kg)              Today, you had the following     No orders found for display       Primary Care Provider Office Phone # Fax #    Kristen M Kehr, PA-C [phone] [address] [facility] [address]        Equal Access to Services     MARILYN HOUSER : Hadii aad ku hadasho Soomaali, waaxda luqadaha, qaybta kaalmada adeegyada, waxay adánin haymarkn jacques mora laoumar . So [facility] [phone].    ATENCIÓN: Si habla español, tiene a heredia disposición servicios gratuitos de asistencia lingüística. Llame al [phone].    We comply with applicable federal civil rights laws and [place] laws. We do not discriminate on the basis of race, color, national origin, age, disability, sex, sexual orientation, or gender identity.            Thank you!     Thank you for choosing [facility]  for your care. Our goal is always to provide you with excellent care. Hearing back from our patients is one way we can continue to improve our services. Please take a few minutes to complete the  written survey that you may receive in the mail after your visit with us. Thank you!             Your Updated Medication List - Protect others around you: Learn how to safely use, store and throw away your medicines at www.disposemymeds.org.          This list is accurate as of 8/28/18  9:07 AM.  Always use your most recent med list.                   Brand Name Dispense Instructions for use Diagnosis    DHA PO           levothyroxine 50 MCG tablet    SYNTHROID/LEVOTHROID    90 tablet    TAKE 1 TABLET BY MOUTH EVERY DAY    Hypothyroidism, unspecified type       prenatal multivitamin plus iron 27-0.8 MG Tabs per tablet      Take 1 tablet by mouth daily

## 2018-09-07 ENCOUNTER — PRENATAL OFFICE VISIT (OUTPATIENT)
Dept: OBGYN | Facility: CLINIC | Age: 33
End: 2018-09-07
Payer: COMMERCIAL

## 2018-09-07 VITALS
SYSTOLIC BLOOD PRESSURE: 96 MMHG | BODY MASS INDEX: 21.97 KG/M2 | HEART RATE: 76 BPM | WEIGHT: 127.9 LBS | DIASTOLIC BLOOD PRESSURE: 62 MMHG

## 2018-09-07 DIAGNOSIS — Z23 NEED FOR PROPHYLACTIC VACCINATION AND INOCULATION AGAINST INFLUENZA: ICD-10-CM

## 2018-09-07 DIAGNOSIS — Z34.93 NORMAL PREGNANCY IN THIRD TRIMESTER: Primary | ICD-10-CM

## 2018-09-07 DIAGNOSIS — E03.9 HYPOTHYROIDISM, UNSPECIFIED TYPE: ICD-10-CM

## 2018-09-07 PROCEDURE — 99207 ZZC PRENATAL VISIT: CPT | Performed by: OBSTETRICS & GYNECOLOGY

## 2018-09-07 PROCEDURE — 90471 IMMUNIZATION ADMIN: CPT | Performed by: OBSTETRICS & GYNECOLOGY

## 2018-09-07 PROCEDURE — 90685 IIV4 VACC NO PRSV 0.25 ML IM: CPT | Performed by: OBSTETRICS & GYNECOLOGY

## 2018-09-07 NOTE — PROGRESS NOTES
33 year old  at 29w1d weeks presents to the clinic for a routine prenatal visit.  No concerns. Patient denies any vaginal bleeding, leakage of fluid, or contractions   Good fetal movement.    Fundal height=29cm  CLYi=948's  TWZ=646  Hgb=12.3  MFM ultrasound scheduled for   Hypothyroidism=TSH=1.75 on   RTC 2 weeks    Tatianna Ch

## 2018-09-07 NOTE — PROGRESS NOTES

## 2018-09-13 ENCOUNTER — TELEPHONE (OUTPATIENT)
Dept: OBGYN | Facility: CLINIC | Age: 33
End: 2018-09-13

## 2018-09-13 NOTE — TELEPHONE ENCOUNTER
"Lalitha Cortez is a 33 year old female who calls with weird sensations in her cervix.    NURSING ASSESSMENT:  Description:  Pt is wondering if she should be concerned regarding some \"weird sensations\" she has been feeling in her cervix.  She is 30 weeks pregnant and this is a new sensation for her.  Onset/duration:  The last 48 hours  Precip. factors:  Unknown.  Pt is 30 weeks pregnant  Associated symptoms:  Intermittent \"weird sensation\" (tingling) in cervix.  Pt reports it comes in waves, it is not constant and it goes away on own, she states she does have some white vaginal discharge but that isn't new and her OB provider knows about it.  Denies abdominal cramping/pain, vaginal bleeding, pelvic pain, urinary symptoms.  Pt states baby is still moving as normal.  Improves/worsens symptoms:  Comes and goes on its own.  Pain scale (0-10)   0/10  Last exam/Treatment:  9/7/18  Allergies:   Allergies   Allergen Reactions     Nkda [No Known Drug Allergies]      Seasonal Allergies        NURSING PLAN: Routed to provider Yes    RECOMMENDED DISPOSITION:  Home care advice - Monitor symptoms for now.  If starts to experience pain, vaginal bleeding, symptoms worsening call back right away.  Will route to pt's OB provider.  Will also route to another OB provider as pt's OB provider is out today in case there are recommendations that can't wait until tomorrow.  Will comply with recommendation: Yes  If further questions/concerns or if symptoms do not improve, worsen or new symptoms develop, call your PCP or [facility] as soon as possible.        Jessica Davis RN    "

## 2018-09-13 NOTE — TELEPHONE ENCOUNTER
If she is not having any vaginal bleeding, leakage of fluid, or regular contractions this is likely ok.  She can make an appointment for tomorrow if she is still feeling this or I can see her early next week.     Tatianna Ch

## 2018-09-18 ENCOUNTER — MYC MEDICAL ADVICE (OUTPATIENT)
Dept: OBGYN | Facility: CLINIC | Age: 33
End: 2018-09-18

## 2018-09-19 ENCOUNTER — MYC MEDICAL ADVICE (OUTPATIENT)
Dept: OBGYN | Facility: CLINIC | Age: 33
End: 2018-09-19

## 2018-09-25 ENCOUNTER — PRENATAL OFFICE VISIT (OUTPATIENT)
Dept: OBGYN | Facility: CLINIC | Age: 33
End: 2018-09-25
Payer: COMMERCIAL

## 2018-09-25 VITALS
BODY MASS INDEX: 22.4 KG/M2 | DIASTOLIC BLOOD PRESSURE: 68 MMHG | WEIGHT: 130.4 LBS | OXYGEN SATURATION: 98 % | SYSTOLIC BLOOD PRESSURE: 102 MMHG | HEART RATE: 89 BPM

## 2018-09-25 DIAGNOSIS — Z34.93 NORMAL PREGNANCY IN THIRD TRIMESTER: Primary | ICD-10-CM

## 2018-09-25 DIAGNOSIS — E03.9 HYPOTHYROIDISM, UNSPECIFIED TYPE: ICD-10-CM

## 2018-09-25 PROCEDURE — 99207 ZZC PRENATAL VISIT: CPT | Performed by: OBSTETRICS & GYNECOLOGY

## 2018-09-25 NOTE — PROGRESS NOTES
33 year old  at 31w5d weeks presents to the clinic for a routine prenatal visit.  No concerns. Patient denies any vaginal bleeding, leakage of fluid, or contractions   Good fetal movement.    Fundal height=31cm  DQVy=617  FDE=273  Hgb=12.3  Hypothyroidism=TSH=1.75 on   M ultrasound this Friday as a follow up ultrasound for growth   RTC 2 weeks    Tatianna Ch

## 2018-09-25 NOTE — MR AVS SNAPSHOT
After Visit Summary   2018    Lalitha Cortez    MRN: [id number]           Patient Information     Date Of Birth          1985        Visit Information        Provider Department      2018 8:00 AM Tatianna Ch DO [facility]        Today's Diagnoses     Normal pregnancy in third trimester    -  1    Hypothyroidism, unspecified type          Care Instructions    SIGNS OF  LABOR    Labor is  if it happens more than three weeks before your due date.    It can be hard to know if you are in labor, since the symptoms can be like the normal feelings of pregnancy.  Often, the only difference is the symptoms increase or they don't go away.     Signs of  labor can include:    Change in your vaginal discharge:  You will have more vaginal discharge when you are pregnant and it should be creamy white.  Call the clinic right away if your discharge has a foul odor, pink or bloody,  or if it becomes watery or is more than is normal for you during your pregnancy.    More than 5-6 contractions or tightenings per hour.  Contractions can feel like period cramps or bowel (gas or diarrhea) pain.  You will feel it in the lower part of your abdomen, in your back or as a pressure feeling in your bottom.  It is often regular, coming for 30 seconds or a minute and then going away, only to come back 5 or 10 minutes later. Some contractions are normal during pregnancy, but if you are feeling more than 5-6 in one hour, empty your bladder, then drink 16-24 ounces of water, eat a snack and lay down on your left side. Put your hand on your abdomen to count the contractions.  If after one hour of resting you have still had 5-6 contractions call your clinic.          Follow-ups after your visit        Follow-up notes from your care team     Return in about 2 weeks (around 10/9/2018).      Your next 10 appointments already scheduled     Oct 09, 2018  8:00 AM CDT   ESTABLISHED  PRENATAL with Tatianna Ch, DO   [facility] ([facility])    [address] [address] [place] [address]   [phone]            Oct 23, 2018  1:00 PM CDT   ESTABLISHED PRENATAL with Tatianna Ch, DO   [facility] ([facility])    [address] [address] [facility] [address]   [phone]            Nov 06, 2018  8:00 AM CST   ESTABLISHED PRENATAL with Tatianna Ch, DO   [facility] ([facility])    [address] [place] [address]   [phone]            Nov 13, 2018  8:30 AM CST   ESTABLISHED PRENATAL with Tatianna Ch, DO   [facility] ([facility])    [address] [place] [address]   [phone]            Nov 20, 2018  8:30 AM CST   ESTABLISHED PRENATAL with Tatianna Ch, DO   [facility] ([facility])    [address] [place] [address]   [phone]              Who to contact     If you have questions or need follow up information about today's clinic visit or your schedule please contact [facility] directly at [phone].  Normal or non-critical lab and imaging results will be communicated to you by MyChart, letter or phone within 4 business days after the clinic has received the results. If you do not hear from us within 7 days, please contact the clinic through [employer] ([email or web address]hart or phone. If you have a critical or abnormal lab result, we will notify you by phone as soon as possible.  Submit refill requests through [employer] or call your pharmacy and they will forward the refill request to us. Please allow 3 business days for your refill to be completed.          Additional Information About Your Visit        [employer] Information     [employer] gives you secure access to your electronic health record. If you see a primary care provider, you  can also send messages to your care team and make appointments. If you have questions, please call your primary care clinic.  If you do not have a primary care provider, please call [phone] and they will assist you.        Care EveryWhere ID     This is your Care EveryWhere ID. This could be used by other organizations to access your [facility] medical records  [id number]        Your Vitals Were     Pulse Last Period Pulse Oximetry BMI (Body Mass Index)          89 02/09/2018 98% 22.4 kg/m2         Blood Pressure from Last 3 Encounters:   09/25/18 102/68   09/07/18 96/62   08/28/18 94/63    Weight from Last 3 Encounters:   09/25/18 130 lb 6.4 oz (59.1 kg)   09/07/18 127 lb 14.4 oz (58 kg)   08/28/18 127 lb 12.8 oz (58 kg)              Today, you had the following     No orders found for display       Primary Care Provider Office Phone # Fax #    Kristen M Kehr, PA-C [phone] [address] [facility] [address]        Equal Access to Services     [facility] : Hadii aad ku hadasho Soomaali, waaxda luqadaha, qaybta kaalmada adeegyada, rigoberto akers . So [facility] [phone].    ATENCIÓN: Si habla español, tiene a heredia disposición servicios gratuitos de asistencia lingüística. LlAdams County [facility] [phone].    We comply with applicable federal civil rights laws and [place] laws. We do not discriminate on the basis of race, color, national origin, age, disability, sex, sexual orientation, or gender identity.            Thank you!     Thank you for choosing [facility]  for your care. Our goal is always to provide you with excellent care. Hearing back from our patients is one way we can continue to improve our services. Please take a few minutes to complete the written survey that you may receive in the mail after your visit with us. Thank you!             Your Updated Medication List - Protect others around you: Learn how to safely use, store and throw  away your medicines at www.disposemymeds.org.          This list is accurate as of 9/25/18  8:14 AM.  Always use your most recent med list.                   Brand Name Dispense Instructions for use Diagnosis    DHA PO           levothyroxine 50 MCG tablet    SYNTHROID/LEVOTHROID    90 tablet    TAKE 1 TABLET BY MOUTH EVERY DAY    Hypothyroidism, unspecified type       prenatal multivitamin plus iron 27-0.8 MG Tabs per tablet      Take 1 tablet by mouth daily

## 2018-09-28 ENCOUNTER — TRANSFERRED RECORDS (OUTPATIENT)
Dept: HEALTH INFORMATION MANAGEMENT | Facility: CLINIC | Age: 33
End: 2018-09-28

## 2018-10-09 ENCOUNTER — PRENATAL OFFICE VISIT (OUTPATIENT)
Dept: OBGYN | Facility: CLINIC | Age: 33
End: 2018-10-09
Payer: COMMERCIAL

## 2018-10-09 VITALS
OXYGEN SATURATION: 97 % | SYSTOLIC BLOOD PRESSURE: 104 MMHG | WEIGHT: 133 LBS | BODY MASS INDEX: 22.85 KG/M2 | HEART RATE: 88 BPM | DIASTOLIC BLOOD PRESSURE: 69 MMHG

## 2018-10-09 DIAGNOSIS — E03.9 HYPOTHYROIDISM, UNSPECIFIED TYPE: ICD-10-CM

## 2018-10-09 DIAGNOSIS — Z34.91 NORMAL PREGNANCY IN FIRST TRIMESTER: ICD-10-CM

## 2018-10-09 DIAGNOSIS — K21.9 GASTROESOPHAGEAL REFLUX DISEASE WITHOUT ESOPHAGITIS: ICD-10-CM

## 2018-10-09 DIAGNOSIS — Z34.93 NORMAL PREGNANCY IN THIRD TRIMESTER: Primary | ICD-10-CM

## 2018-10-09 LAB — TSH SERPL DL<=0.005 MIU/L-ACNC: 2.15 MU/L (ref 0.4–4)

## 2018-10-09 PROCEDURE — 36415 COLL VENOUS BLD VENIPUNCTURE: CPT | Performed by: OBSTETRICS & GYNECOLOGY

## 2018-10-09 PROCEDURE — 84443 ASSAY THYROID STIM HORMONE: CPT | Performed by: OBSTETRICS & GYNECOLOGY

## 2018-10-09 PROCEDURE — 99207 ZZC PRENATAL VISIT: CPT | Performed by: OBSTETRICS & GYNECOLOGY

## 2018-10-09 RX ORDER — CETIRIZINE HYDROCHLORIDE 10 MG/1
10 TABLET ORAL
COMMUNITY
End: 2018-11-20

## 2018-10-09 NOTE — PROGRESS NOTES
33 year old  at 33w5d weeks presents to the clinic for a routine prenatal visit.    No concerns.  Patient denies any vaginal bleeding, leakage of fluid, or contractions     Good fetal movement  Fundal height=34cm  DXLk=871  GERD=stable  Hypothyroidism=1.75.  Will check thyroid today  Discussed labor precautions.  RTC 2 weeks    Tatianna Ch

## 2018-10-09 NOTE — MR AVS SNAPSHOT
After Visit Summary   10/9/2018    Lalitha Cortez    MRN: [id number]           Patient Information     Date Of Birth          1985        Visit Information        Provider Department      10/9/2018 8:00 AM Tatianna Ch DO [facility]        Today's Diagnoses     Normal pregnancy in third trimester    -  1    Gastroesophageal reflux disease without esophagitis        Hypothyroidism, unspecified type        Normal pregnancy in first trimester          Care Instructions    SIGNS OF  LABOR    Labor is  if it happens more than three weeks before your due date.    It can be hard to know if you are in labor, since the symptoms can be like the normal feelings of pregnancy.  Often, the only difference is the symptoms increase or they don't go away.     Signs of  labor can include:    Change in your vaginal discharge:  You will have more vaginal discharge when you are pregnant and it should be creamy white.  Call the clinic right away if your discharge has a foul odor, pink or bloody,  or if it becomes watery or is more than is normal for you during your pregnancy.    More than 5-6 contractions or tightenings per hour.  Contractions can feel like period cramps or bowel (gas or diarrhea) pain.  You will feel it in the lower part of your abdomen, in your back or as a pressure feeling in your bottom.  It is often regular, coming for 30 seconds or a minute and then going away, only to come back 5 or 10 minutes later. Some contractions are normal during pregnancy, but if you are feeling more than 5-6 in one hour, empty your bladder, then drink 16-24 ounces of water, eat a snack and lay down on your left side. Put your hand on your abdomen to count the contractions.  If after one hour of resting you have still had 5-6 contractions call your clinic.          Follow-ups after your visit        Follow-up notes from your care team     Return in about 2 weeks (around  10/23/2018).      Your next 10 appointments already scheduled     Oct 23, 2018  1:00 PM CDT   ESTABLISHED PRENATAL with Tatianna Ch, DO   [facility] ([facility])    [address] [place] [address]   [phone]            Nov 06, 2018  8:00 AM CST   ESTABLISHED PRENATAL with Tatianna Ch, DO   [facility] ([facility])    [address] [place] [address]   [phone]            Nov 13, 2018  8:30 AM CST   ESTABLISHED PRENATAL with Tatianna Ch, DO   [facility] ([facility])    [address] [place] [address]   [phone]            Nov 20, 2018  8:30 AM CST   ESTABLISHED PRENATAL with Tatianna Ch,    [facility] ([facility])    [address] [place] [address]   [phone]              Who to contact     If you have questions or need follow up information about today's clinic visit or your schedule please contact [facility] directly at [phone].  Normal or non-critical lab and imaging results will be communicated to you by velingohart, letter or phone within 4 business days after the clinic has received the results. If you do not hear from us within 7 days, please contact the clinic through velingohart or phone. If you have a critical or abnormal lab result, we will notify you by phone as soon as possible.  Submit refill requests through [email or web address] or call your pharmacy and they will forward the refill request to us. Please allow 3 business days for your refill to be completed.          Additional Information About Your Visit        [email or web address] Information     [email or web address] gives you secure access to your electronic health record. If you see a primary care provider, you can also send messages to your care team and make appointments. If you have questions, please call your  primary care clinic.  If you do not have a primary care provider, please call [phone] and they will assist you.        Care EveryWhere ID     This is your Care EveryWhere ID. This could be used by other organizations to access your [place] medical records  [id number]        Your Vitals Were     Pulse Last Period Pulse Oximetry BMI (Body Mass Index)          88 02/09/2018 97% 22.85 kg/m2         Blood Pressure from Last 3 Encounters:   10/09/18 104/69   09/25/18 102/68   09/07/18 96/62    Weight from Last 3 Encounters:   10/09/18 133 lb (60.3 kg)   09/25/18 130 lb 6.4 oz (59.1 kg)   09/07/18 127 lb 14.4 oz (58 kg)              We Performed the Following     TSH        Primary Care Provider Office Phone # Fax #    Kristen M Kehr, PA-C [phone] [address] [facility] [address]        Equal Access to Services     PINKY HOUSER : Hadii aad ku hadasho Soomaali, waaxda luqadaha, qaybta kaalmada adeegyada, waxay idiin hayaan adeeg wilberarachristopher la'christina . So [facility] [phone].    ATENCIÓN: Si habla español, tiene a heredia disposición servicios gratuitos de asistencia lingüística. Llame al [phone].    We comply with applicable federal civil rights laws and [place] laws. We do not discriminate on the basis of race, color, national origin, age, disability, sex, sexual orientation, or gender identity.            Thank you!     Thank you for choosing [facility]  for your [place]. Our goal is always to provide you with excellent care. Hearing back from our patients is one way we can continue to improve our services. Please take a few minutes to complete the written survey that you may receive in the mail after your visit with us. Thank you!             Your Updated Medication List - Protect others around you: Learn how to safely use, store and throw away your medicines at www.disposemymeds.org.          This list is accurate as of 10/9/18  8:22 AM.  Always use your most recent med  list.                   Brand Name Dispense Instructions for use Diagnosis    DHA PO           levothyroxine 50 MCG tablet    SYNTHROID/LEVOTHROID    90 tablet    TAKE 1 TABLET BY MOUTH EVERY DAY    Hypothyroidism, unspecified type       prenatal multivitamin plus iron 27-0.8 MG Tabs per tablet      Take 1 tablet by mouth daily        RA CETIRIZINE 10 MG tablet   Generic drug:  cetirizine      Take 10 mg by mouth

## 2018-10-15 ENCOUNTER — TELEPHONE (OUTPATIENT)
Dept: OBGYN | Facility: CLINIC | Age: 33
End: 2018-10-15

## 2018-10-15 DIAGNOSIS — E03.9 HYPOTHYROIDISM, UNSPECIFIED TYPE: ICD-10-CM

## 2018-10-15 NOTE — TELEPHONE ENCOUNTER
"Requested Prescriptions   Pending Prescriptions Disp Refills     levothyroxine (SYNTHROID/LEVOTHROID) 50 MCG tablet [Pharmacy Med Name: LEVOTHYROXINE 0.05MG (50MCG) TAB] 90 tablet 0     Sig: TAKE 1 TABLET BY MOUTH EVERY DAY    Thyroid Protocol Failed    10/15/2018  3:25 AM       Failed - No active pregnancy on record    If patient is pregnant or has had a positive pregnancy test, please check TSH.         Failed - No positive pregnancy test in past 12 months    If patient is pregnant or has had a positive pregnancy test, please check TSH.         Passed - Patient is 12 years or older       Passed - Recent (12 mo) or future (30 days) visit within the authorizing provider's specialty    Patient had office visit in the last 12 months or has a visit in the next 30 days with authorizing provider or within the authorizing provider's specialty.  See \"Patient Info\" tab in inbasket, or \"Choose Columns\" in Meds & Orders section of the refill encounter.           Passed - Normal TSH on file in past 12 months    Recent Labs   Lab Test  10/09/18   0828   TSH  2.15                    "

## 2018-10-15 NOTE — TELEPHONE ENCOUNTER
[facility] Call Center    Phone Message    May a detailed message be left on voicemail: yes    Reason for Call: Other: Patient is 34 weeks pregnant and states yesterday she bent over to pick something up and felt strange squeezing/rubbing sensation under her R breast. She states it is still sore and is not sure if something happened to her or the baby. She tried to recreate what she did again this morning and did not have the same sensation. She would like a call to discuss.      Action Taken: Message routed to:  [facility] p [phone]

## 2018-10-15 NOTE — TELEPHONE ENCOUNTER
"Return call to patient. Patient stated she was at a play and went to put a water bottle down on the ground and she felt a foot go up under her right breast. Patient stated she felt soreness and continues to be sore rating as 4/10. Patient questioned if the movement \"squished his foot.\" Patient has felt baby move since incident and no PROM, ct or vaginal bleeding. Explained that baby is surrounded in amniotic fld and probably no harm. Explained she could monitor sx's and use ice/heat & Tylenol prn. Offered patient a same day appt and patient declined. Patient stated she feels reassured and will monitor at this time. Patient stated her  also agreed that she did not harm baby. Explained she could very well have hurts her muscles from the bend or be sore inside where infant pushed into her. Verified future appt with Dr. Ch next week. Patient will call back if sx's change. Danii Bond RN, BAN      "

## 2018-10-17 RX ORDER — LEVOTHYROXINE SODIUM 50 UG/1
TABLET ORAL
Qty: 90 TABLET | Refills: 3 | Status: SHIPPED | OUTPATIENT
Start: 2018-10-17 | End: 2020-01-29

## 2018-10-17 NOTE — TELEPHONE ENCOUNTER
"Requested Prescriptions   Pending Prescriptions Disp Refills     levothyroxine (SYNTHROID/LEVOTHROID) 50 MCG tablet [Pharmacy Med Name: LEVOTHYROXINE 0.05MG (50MCG) TAB] 90 tablet 0     Sig: TAKE 1 TABLET BY MOUTH EVERY DAY    Thyroid Protocol Failed    10/17/2018 12:14 PM       Failed - No active pregnancy on record    If patient is pregnant or has had a positive pregnancy test, please check TSH.         Failed - No positive pregnancy test in past 12 months    If patient is pregnant or has had a positive pregnancy test, please check TSH.         Passed - Patient is 12 years or older       Passed - Recent (12 mo) or future (30 days) visit within the authorizing provider's specialty    Patient had office visit in the last 12 months or has a visit in the next 30 days with authorizing provider or within the authorizing provider's specialty.  See \"Patient Info\" tab in inbasket, or \"Choose Columns\" in Meds & Orders section of the refill encounter.             Passed - Normal TSH on file in past 12 months    Recent Labs   Lab Test  10/09/18   0828   TSH  2.15              Routing refill request to provider for review/approval because:  Pt is pregnant.    Jessica Davis RN          "

## 2018-10-17 NOTE — TELEPHONE ENCOUNTER
Patient states she needs this medication refilled today.  She would also like a nurse to call her.  Ok to leave a message.  Thank You.

## 2018-10-23 ENCOUNTER — PRENATAL OFFICE VISIT (OUTPATIENT)
Dept: OBGYN | Facility: CLINIC | Age: 33
End: 2018-10-23
Payer: COMMERCIAL

## 2018-10-23 VITALS
BODY MASS INDEX: 23.52 KG/M2 | OXYGEN SATURATION: 97 % | DIASTOLIC BLOOD PRESSURE: 62 MMHG | WEIGHT: 136.9 LBS | HEART RATE: 84 BPM | SYSTOLIC BLOOD PRESSURE: 97 MMHG

## 2018-10-23 DIAGNOSIS — E03.9 HYPOTHYROIDISM, UNSPECIFIED TYPE: ICD-10-CM

## 2018-10-23 DIAGNOSIS — Z87.59 S/P ECTOPIC PREGNANCY: ICD-10-CM

## 2018-10-23 DIAGNOSIS — Z34.93 NORMAL PREGNANCY IN THIRD TRIMESTER: Primary | ICD-10-CM

## 2018-10-23 DIAGNOSIS — K21.9 GASTROESOPHAGEAL REFLUX DISEASE WITHOUT ESOPHAGITIS: ICD-10-CM

## 2018-10-23 PROCEDURE — 99207 ZZC PRENATAL VISIT: CPT | Performed by: OBSTETRICS & GYNECOLOGY

## 2018-10-23 PROCEDURE — 87186 SC STD MICRODIL/AGAR DIL: CPT | Performed by: OBSTETRICS & GYNECOLOGY

## 2018-10-23 PROCEDURE — 87653 STREP B DNA AMP PROBE: CPT | Performed by: OBSTETRICS & GYNECOLOGY

## 2018-10-23 NOTE — MR AVS SNAPSHOT
After Visit Summary   10/23/2018    Lalitha Cortez    MRN: [id number]           Patient Information     Date Of Birth          1985        Visit Information        Provider Department      10/23/2018 1:00 PM Tatianna Ch DO [facility]        Today's Diagnoses     Normal pregnancy in third trimester    -  1    Gastroesophageal reflux disease without esophagitis        Hypothyroidism, unspecified type        S/P ectopic pregnancy          Care Instructions    What to watch out for are: regular contractions every 5 min, vaginal bleeding, decreased fetal movement, or leakage of fluid.  Please call the office or go to L&D if you develop any of these signs and symptoms.      I will see you for your follow up appointment.  Please feel free to call if you have any questions or concerns.      Thanks,  Tatianna Ch, DO            Follow-ups after your visit        Follow-up notes from your care team     Return in about 1 week (around 10/30/2018).      Your next 10 appointments already scheduled     Oct 30, 2018  8:00 AM CDT   ESTABLISHED PRENATAL with Cephas Mawuena Agbeh, MD   [facility] ([facility])    [address] [facility] [address]   [phone]            Nov 06, 2018  8:00 AM CST   ESTABLISHED PRENATAL with Tatianna Ch DO   [facility] ([facility])    [address] [facility] [address]   [phone]            Nov 13, 2018  8:30 AM CST   ESTABLISHED PRENATAL with Tatianna Ch DO   [facility] ([facility])    [address] [facility] [address]   [phone]            Nov 20, 2018  8:30 AM CST   ESTABLISHED PRENATAL with Tatianna Ch DO   [facility] ([facility])    [address] [facility] [address]   [phone]              Who to contact     If you  have questions or need follow up information about today's clinic visit or your schedule please contact [facility] directly at [phone].  Normal or non-critical lab and imaging results will be communicated to you by MyChart, letter or phone within 4 business days after the clinic has received the results. If you do not hear from us within 7 days, please contact the clinic through BaseKithart or phone. If you have a critical or abnormal lab result, we will notify you by phone as soon as possible.  Submit refill requests through VENNCOMM or call your pharmacy and they will forward the refill request to us. Please allow 3 business days for your refill to be completed.          Additional Information About Your Visit        BaseKitharCopperKey Information     VENNCOMM gives you secure access to your electronic health record. If you see a primary care provider, you can also send messages to your care team and make appointments. If you have questions, please call your primary care clinic.  If you do not have a primary care provider, please call [phone] and they will assist you.        Care EveryWhere ID     This is your Care EveryWhere ID. This could be used by other organizations to access your [place] medical records  [id number]        Your Vitals Were     Pulse Last Period Pulse Oximetry BMI (Body Mass Index)          84 02/09/2018 97% 23.52 kg/m2         Blood Pressure from Last 3 Encounters:   10/23/18 97/62   10/09/18 104/69   09/25/18 102/68    Weight from Last 3 Encounters:   10/23/18 136 lb 14.4 oz (62.1 kg)   10/09/18 133 lb (60.3 kg)   09/25/18 130 lb 6.4 oz (59.1 kg)              Today, you had the following     No orders found for display       Primary Care Provider Office Phone # Fax #    Kristen M Kehr, PA-C [phone]       [address] [facility] [address]        Equal Access to Services     MARLIYN HOUSER AH: Sandi Osorio, myla gaviria, anshul brooks  rigoberto knightmicaelachristopher la'aan ah. Yuliana [facility] [phone].    ATENCIÓN: Si habla lavern, tiene a heredia disposición servicios gratuitos de asistencia lingüística. Nichelle al [phone].    We comply with applicable federal civil rights laws and [place] laws. We do not discriminate on the basis of race, color, national origin, age, disability, sex, sexual orientation, or gender identity.            Thank you!     Thank you for choosing [facility]  for your care. Our goal is always to provide you with excellent care. Hearing back from our patients is one way we can continue to improve our services. Please take a few minutes to complete the written survey that you may receive in the mail after your visit with us. Thank you!             Your Updated Medication List - Protect others around you: Learn how to safely use, store and throw away your medicines at www.disposemymeds.org.          This list is accurate as of 10/23/18  1:37 PM.  Always use your most recent med list.                   Brand Name Dispense Instructions for use Diagnosis    DHA PO           levothyroxine 50 MCG tablet    SYNTHROID/LEVOTHROID    90 tablet    TAKE 1 TABLET BY MOUTH EVERY DAY    Hypothyroidism, unspecified type       prenatal multivitamin plus iron 27-0.8 MG Tabs per tablet      Take 1 tablet by mouth daily        RA CETIRIZINE 10 MG tablet   Generic drug:  cetirizine      Take 10 mg by mouth

## 2018-10-23 NOTE — PROGRESS NOTES
33 year old  at 35w5d weeks presents to the clinic for a routine prenatal visit.  No concerns.  No vaginal bleeding, leakage of fluid, or contractions  Fundal height=35cm  BMMa=991  CX=Cl/Th/-3  Vertex by BSUS  GBS done today  GERD=stable  Hypothyroidism=stable  Labor precautions discussed  RTC 1 week    Tatianna Ch

## 2018-10-24 LAB
GP B STREP DNA SPEC QL NAA+PROBE: POSITIVE
SPECIMEN SOURCE: ABNORMAL

## 2018-10-25 ENCOUNTER — MYC MEDICAL ADVICE (OUTPATIENT)
Dept: OBGYN | Facility: CLINIC | Age: 33
End: 2018-10-25

## 2018-10-25 PROBLEM — O99.820 GBS (GROUP B STREPTOCOCCUS CARRIER), +RV CULTURE, CURRENTLY PREGNANT: Status: ACTIVE | Noted: 2018-10-25

## 2018-10-25 NOTE — TELEPHONE ENCOUNTER
"Entered by Tatianna Ch DO at 10/25/2018  9:30 AM   Read by Lalitha Cortez at 10/25/2018  3:09 PM   Lalitha Cortez            The result of your GBS testing is positive which means you will need an antibiotic in labor.  I will see you for your follow up appointment     Phone call to patient and verified lab as above. Patient stated she saw the results in a meeting and panicked. Explained GBS and patient was very appreciative of call back this evening so she \"sleeps better tonight.\" Danii Bond RN, BAN    "

## 2018-10-27 LAB
BACTERIA SPEC CULT: ABNORMAL
SPECIMEN SOURCE: ABNORMAL

## 2018-10-29 NOTE — PATIENT INSTRUCTIONS
If you have any questions regarding your visit, Please contact your care team.    [facility]: [phone]      [facility] CLINIC HOURS TELEPHONE NUMBER   Cephas Agbeh, M.D.    FRANCIS Ling,     FIORELLA Foy, FIORELLA             Monday-[place]    8:00a.m-4:45 p.m    Tuesday--[place]     8:00a.m-4:45 p.m.    Thursday-[place]    8:00a.m-4:45 p.m.    Friday-[place]    8:00a.m-4:45 p.m    [facility]   [address]   [place] [address] [phone]-Ask for [facility]   Fax [phone]   Nasxxqj-[phone]     [facility] Labor and Delivery   [address] [place] [address] Dr.   [place], [place] [address] [phone]     [facility]   [address] [facility] [address] [phone]   Rqyblpj-[phone]    Urgent Care locations:    [facility]  Monday-Friday   5 pm - 9 pm   Saturday and Sunday    9 am - 5 pm      Monday-Friday    11 pm - 9 pm  Saturday and Sunday   9 am - 5 pm    [phone]     If you need a medication refill, please contact your pharmacy. Please allow 3 business days for your refill to be completed.  As always, Thank you for trusting us with your healthcare needs!

## 2018-10-30 ENCOUNTER — PRENATAL OFFICE VISIT (OUTPATIENT)
Dept: OBGYN | Facility: CLINIC | Age: 33
End: 2018-10-30
Payer: COMMERCIAL

## 2018-10-30 VITALS
SYSTOLIC BLOOD PRESSURE: 101 MMHG | OXYGEN SATURATION: 97 % | HEART RATE: 87 BPM | WEIGHT: 137 LBS | BODY MASS INDEX: 23.53 KG/M2 | DIASTOLIC BLOOD PRESSURE: 63 MMHG

## 2018-10-30 DIAGNOSIS — O99.820 GBS (GROUP B STREPTOCOCCUS CARRIER), +RV CULTURE, CURRENTLY PREGNANT: Primary | ICD-10-CM

## 2018-10-30 DIAGNOSIS — Z34.91 NORMAL PREGNANCY IN FIRST TRIMESTER: ICD-10-CM

## 2018-10-30 PROCEDURE — 99207 ZZC PRENATAL VISIT: CPT | Performed by: OBSTETRICS & GYNECOLOGY

## 2018-10-30 NOTE — PROGRESS NOTES
36w5d  No HA, visual changes, N/V etc.  Labor plan and warning s/s discussed. Routine AG. See flowsheet. RTC 1 wk/prn.  GBS positive. Discussed intrapartum abx..    ICD-10-CM    1. GBS (group B Streptococcus carrier), +RV culture, currently pregnant O99.820    2. Normal pregnancy in first trimester Z34.91      CEPHAS AGBEH, MD.

## 2018-10-30 NOTE — MR AVS SNAPSHOT
After Visit Summary   10/30/2018    Lalitha Cortez    MRN: [id number]           Patient Information     Date Of Birth          1985        Visit Information        Provider Department      10/30/2018 8:00 AM Agbeh, Cephas Mawuena, MD [facility]        Today's Diagnoses     GBS (group B Streptococcus carrier), +RV culture, currently pregnant    -  1    Normal pregnancy in first trimester          Care Instructions                                                        If you have any questions regarding your visit, Please contact your care team.    [facility] Access Services: [phone]      [facility] CLINIC HOURS TELEPHONE NUMBER   Cephas Agbeh, M.D.    FRANCIS Ling,     FIORELLA Foy, FIORELLA             Monday-[place]    8:00a.m-4:45 p.m    Tuesday--[place]     8:00a.m-4:45 p.m.    Thursday-[place]    8:00a.m-4:45 p.m.    Friday-[place]    8:00a.m-4:45 p.m    [facility]   [address] [place] [address]   [phone]-Ask for [facility]   Fax [phone]   Parwgwn-[phone]     [facility] Labor and Delivery   [address] [place] [address] Dr.   [place], [place] [address] [phone]     [facility]   [address] [facility] [address] [phone]   Odiuqkh-[phone]    Urgent Care locations:    [facility]  Monday-Friday   5 pm - 9 pm   Saturday and Sunday    9 am - 5 pm      Monday-Friday    11 pm - 9 pm  Saturday and Sunday   9 am - 5 pm    [phone]     If you need a medication refill, please contact your pharmacy. Please allow 3 business days for your refill to be completed.  As always, Thank you for trusting us with your healthcare needs!            Follow-ups after your visit        Your next 10 appointments already scheduled     Nov 06, 2018  8:00 AM CST   ESTABLISHED PRENATAL with Tatianna Ch,    [facility] ([facility]  [place]    [address] [place] [address]   [phone]            Nov 13, 2018  8:30 AM CST   ESTABLISHED PRENATAL with Tatianna Ch,    [facility] ([facility]    [address] [place] [address]   [phone]            Nov 20, 2018  8:30 AM CST   ESTABLISHED PRENATAL with Tatianna Ch, DO   [facility] ([facility]    [address] [place] [address] [phone]              Who to contact     If you have questions or need follow up information about today's clinic visit or your schedule please contact [facility] directly at [phone].  Normal or non-critical lab and imaging results will be communicated to you by MyChart, letter or phone within 4 business days after the clinic has received the results. If you do not hear from us within 7 days, please contact the clinic through Talentwirehart or phone. If you have a critical or abnormal lab result, we will notify you by phone as soon as possible.  Submit refill requests through Optoro or call your pharmacy and they will forward the refill request to us. Please allow 3 business days for your refill to be completed.          Additional Information About Your Visit        Talentwirehart Information     Optoro gives you secure access to your electronic health record. If you see a primary care provider, you can also send messages to your care team and make appointments. If you have questions, please call your primary care clinic.  If you do not have a primary care provider, please call [phone] and they will assist you.        Care EveryWhere ID     This is your Care EveryWhere ID. This could be used by other organizations to access your [place] medical records  [id number]        Your Vitals Were     Pulse Last Period Pulse Oximetry BMI (Body Mass Index)          87 02/09/2018 97% 23.53 kg/m2         Blood  Pressure from Last 3 Encounters:   10/30/18 101/63   10/23/18 97/62   10/09/18 104/69    Weight from Last 3 Encounters:   10/30/18 137 lb (62.1 kg)   10/23/18 136 lb 14.4 oz (62.1 kg)   10/09/18 133 lb (60.3 kg)              Today, you had the following     No orders found for display       Primary Care Provider Office Phone # Fax #    Kristen M Kehr, PA-C [phone] [address] [facility] [address]        Equal Access to Services     [facility]: Hadii kallie lundberg hadasho Soshayy, waaxda luqadaha, qaybta kaalmada ademikeyaarielle, rigoberto akers . So [facility] [place] [phone].    ATENCIÓN: Si habla español, tiene a heredia disposición servicios gratuitos de asistencia lingüística. LlSelect [facility] [phone].    We comply with applicable federal civil rights laws and [place] laws. We do not discriminate on the basis of race, color, national origin, age, disability, sex, sexual orientation, or gender identity.            Thank you!     Thank you for choosing [facility]  for your care. Our goal is always to provide you with excellent care. Hearing back from our patients is one way we can continue to improve our services. Please take a few minutes to complete the written survey that you may receive in the mail after your visit with us. Thank you!             Your Updated Medication List - Protect others around you: Learn how to safely use, store and throw away your medicines at www.disposemymeds.org.          This list is accurate as of 10/30/18  8:19 AM.  Always use your most recent med list.                   Brand Name Dispense Instructions for use Diagnosis    DHA PO           levothyroxine 50 MCG tablet    SYNTHROID/LEVOTHROID    90 tablet    TAKE 1 TABLET BY MOUTH EVERY DAY    Hypothyroidism, unspecified type       prenatal multivitamin plus iron 27-0.8 MG Tabs per tablet      Take 1 tablet by mouth daily        RA CETIRIZINE 10 MG tablet   Generic drug:  cetirizine       Take 10 mg by mouth

## 2018-11-06 ENCOUNTER — PRENATAL OFFICE VISIT (OUTPATIENT)
Dept: OBGYN | Facility: CLINIC | Age: 33
End: 2018-11-06
Payer: COMMERCIAL

## 2018-11-06 VITALS
HEART RATE: 87 BPM | OXYGEN SATURATION: 97 % | WEIGHT: 138 LBS | BODY MASS INDEX: 23.71 KG/M2 | SYSTOLIC BLOOD PRESSURE: 106 MMHG | DIASTOLIC BLOOD PRESSURE: 64 MMHG

## 2018-11-06 DIAGNOSIS — K21.9 GASTROESOPHAGEAL REFLUX DISEASE WITHOUT ESOPHAGITIS: ICD-10-CM

## 2018-11-06 DIAGNOSIS — O99.820 GBS (GROUP B STREPTOCOCCUS CARRIER), +RV CULTURE, CURRENTLY PREGNANT: ICD-10-CM

## 2018-11-06 DIAGNOSIS — Z34.93 NORMAL PREGNANCY IN THIRD TRIMESTER: Primary | ICD-10-CM

## 2018-11-06 PROCEDURE — 99207 ZZC PRENATAL VISIT: CPT | Performed by: OBSTETRICS & GYNECOLOGY

## 2018-11-06 NOTE — MR AVS SNAPSHOT
After Visit Summary   11/6/2018    Lalitha Cortez    MRN: [id number]           Patient Information     Date Of Birth          1985        Visit Information        Provider Department      11/6/2018 8:00 AM Tatianna Ch DO [facility]        Today's Diagnoses     Normal pregnancy in third trimester    -  1    GBS (group B Streptococcus carrier), +RV culture, currently pregnant        Gastroesophageal reflux disease without esophagitis          Care Instructions    What to watch out for are: regular contractions every 5 min, vaginal bleeding, decreased fetal movement, or leakage of fluid.  Please call the office or go to L&D if you develop any of these signs and symptoms.      I will see you for your follow up appointment.  Please feel free to call if you have any questions or concerns.      Thanks,  Tatianna Ch, DO            Follow-ups after your visit        Follow-up notes from your care team     Return in about 1 week (around 11/13/2018).      Your next 10 appointments already scheduled     Nov 13, 2018  8:30 AM CST   ESTABLISHED PRENATAL with Tatianna Ch DO   [facility] ([facility])    [address] [place] [address] [phone]            Nov 20, 2018  8:30 AM CST   ESTABLISHED PRENATAL with Tatianna Ch DO   [facility] ([facility])    [address] [place] [address] [phone]              Who to contact     If you have questions or need follow up information about today's clinic visit or your schedule please contact [facility] directly at [phone].  Normal or non-critical lab and imaging results will be communicated to you by MyChart, letter or phone within 4 business days after the clinic has received the results. If you do not hear from us within 7 days, please contact the clinic through MyChart or phone. If you  have a critical or abnormal lab result, we will notify you by phone as soon as possible.  Submit refill requests through [employer] or call your pharmacy and they will forward the refill request to us. Please allow 3 business days for your refill to be completed.          Additional Information About Your Visit        MyChart Information     [employer] gives you secure access to your electronic health record. If you see a primary care provider, you can also send messages to your care team and make appointments. If you have questions, please call your primary care clinic.  If you do not have a primary care provider, please call [phone] and they will assist you.        Care EveryWhere ID     This is your Care EveryWhere ID. This could be used by other organizations to access your [place] medical records  [id number]        Your Vitals Were     Pulse Last Period Pulse Oximetry BMI (Body Mass Index)          87 02/09/2018 97% 23.71 kg/m2         Blood Pressure from Last 3 Encounters:   11/06/18 106/64   10/30/18 101/63   10/23/18 97/62    Weight from Last 3 Encounters:   11/06/18 138 lb (62.6 kg)   10/30/18 137 lb (62.1 kg)   10/23/18 136 lb 14.4 oz (62.1 kg)              Today, you had the following     No orders found for display       Primary Care Provider Office Phone # Fax #    Kristen M Kehr, PA-C [phone] [address] [facility] [address]        Equal Access to Services     PINKY HOUSER : Hadii aad ku hadasho Soomaali, waaxda luqadaha, qaybta kaalmada adeegyada, rigoberto yu. So [facility] [phone].    ATENCIÓN: Si habla español, tiene a heredia disposición servicios gratuitos de asistencia lingüística. Nichelle al [phone].    We comply with applicable federal civil rights laws and [place] laws. We do not discriminate on the basis of race, color, national origin, age, disability, sex, sexual orientation, or gender identity.            Thank you!     Thank you  for choosing [facility]  for your care. Our goal is always to provide you with excellent care. Hearing back from our patients is one way we can continue to improve our services. Please take a few minutes to complete the written survey that you may receive in the mail after your visit with us. Thank you!             Your Updated Medication List - Protect others around you: Learn how to safely use, store and throw away your medicines at www.disposemymeds.org.          This list is accurate as of 11/6/18  8:24 AM.  Always use your most recent med list.                   Brand Name Dispense Instructions for use Diagnosis    DHA PO           levothyroxine 50 MCG tablet    SYNTHROID/LEVOTHROID    90 tablet    TAKE 1 TABLET BY MOUTH EVERY DAY    Hypothyroidism, unspecified type       prenatal multivitamin plus iron 27-0.8 MG Tabs per tablet      Take 1 tablet by mouth daily        RA CETIRIZINE 10 MG tablet   Generic drug:  cetirizine      Take 10 mg by mouth

## 2018-11-06 NOTE — PROGRESS NOTES
33 year old  at 37w5d weeks presents to the clinic for a routine prenatal visit.  No concerns.  No vaginal bleeding, leakage of fluid, or contractions  Fundal height=38cm  CYZu=605  CX=Cl/50/-2  GBS=positive  Labor precautions discussed  Hypothyroidism=stable  GERD=stable  RTC 1 week    Tatianna Ch

## 2018-11-13 ENCOUNTER — PRENATAL OFFICE VISIT (OUTPATIENT)
Dept: OBGYN | Facility: CLINIC | Age: 33
End: 2018-11-13
Payer: COMMERCIAL

## 2018-11-13 VITALS
BODY MASS INDEX: 23.69 KG/M2 | DIASTOLIC BLOOD PRESSURE: 67 MMHG | WEIGHT: 137.9 LBS | SYSTOLIC BLOOD PRESSURE: 100 MMHG | OXYGEN SATURATION: 97 % | HEART RATE: 91 BPM

## 2018-11-13 DIAGNOSIS — O99.820 GBS (GROUP B STREPTOCOCCUS CARRIER), +RV CULTURE, CURRENTLY PREGNANT: ICD-10-CM

## 2018-11-13 DIAGNOSIS — O48.0 POST-TERM PREGNANCY, 40-42 WEEKS OF GESTATION: ICD-10-CM

## 2018-11-13 DIAGNOSIS — K21.9 GASTROESOPHAGEAL REFLUX DISEASE WITHOUT ESOPHAGITIS: ICD-10-CM

## 2018-11-13 DIAGNOSIS — Z34.93 NORMAL PREGNANCY IN THIRD TRIMESTER: Primary | ICD-10-CM

## 2018-11-13 PROCEDURE — 99207 ZZC PRENATAL VISIT: CPT | Performed by: OBSTETRICS & GYNECOLOGY

## 2018-11-13 NOTE — PROGRESS NOTES
33 year old  at 38w5d weeks presents to the clinic for a routine prenatal visit.  No concerns.  Complains of feeling more pressure.  No vaginal bleeding, leakage of fluid, or contractions   Fundal height=39cm  HBJx=360  CX=deferred per patient request  Discussed labor precautions  RTC 1 week  Hypothyroidism=last TSH was 2.15   GERD=stable  GBS=Positive    Tatianna Ch

## 2018-11-13 NOTE — MR AVS SNAPSHOT
After Visit Summary   11/13/2018    Lalitha Dey    MRN: [id number]           Patient Information     Date Of Birth          1985        Visit Information        Provider Department      11/13/2018 8:30 AM Tatianna Ch DO [facility] [place]        Today's Diagnoses     Normal pregnancy in third trimester    -  1    GBS (group B Streptococcus carrier), +RV culture, currently pregnant        Gastroesophageal reflux disease without esophagitis        Post-term pregnancy, 40-42 weeks of gestation          Care Instructions    What to watch out for are: regular contractions every 5 min, vaginal bleeding, decreased fetal movement, or leakage of fluid.  Please call the office or go to L&D if you develop any of these signs and symptoms.      I will see you for your follow up appointment.  Please feel free to call if you have any questions or concerns.      Thanks,  Tatianna Ch, DO            Follow-ups after your visit        Follow-up notes from your care team     Return in about 1 week (around 11/20/2018).      Your next 10 appointments already scheduled     Nov 20, 2018  8:30 AM CST   ESTABLISHED PRENATAL with Tatianna Ch DO   [facility] [place] ([facility] [place])    [address] [place] [id number] [phone]              Future tests that were ordered for you today     Open Future Orders        Priority Expected Expires Ordered    US Fetal Biophys Prof w/o Non Stress Test Routine  11/13/2019 11/13/2018            Who to contact     If you have questions or need follow up information about today's clinic visit or your schedule please contact [facility] directly at [phone].  Normal or non-critical lab and imaging results will be communicated to you by MyChart, letter or phone within 4 business days after the clinic has received the results. If you do not hear from us within 7 days, please contact the  clinic through [employer] or phone. If you have a critical or abnormal lab result, we will notify you by phone as soon as possible.  Submit refill requests through [employer] or call your pharmacy and they will forward the refill request to us. Please allow 3 business days for your refill to be completed.          Additional Information About Your Visit        iFlexMehart Information     [employer] gives you secure access to your electronic health record. If you see a primary care provider, you can also send messages to your care team and make appointments. If you have questions, please call your primary care clinic.  If you do not have a primary care provider, please call [phone] and they will assist you.        Care EveryWhere ID     This is your Care EveryWhere ID. This could be used by other organizations to access your [place] medical records  [id number]        Your Vitals Were     Pulse Last Period Pulse Oximetry BMI (Body Mass Index)          91 02/09/2018 97% 23.69 kg/m2         Blood Pressure from Last 3 Encounters:   11/13/18 100/67   11/06/18 106/64   10/30/18 101/63    Weight from Last 3 Encounters:   11/13/18 137 lb 14.4 oz (62.6 kg)   11/06/18 138 lb (62.6 kg)   10/30/18 137 lb (62.1 kg)               Primary Care Provider Office Phone # Fax #    Kristen M Kehr, PA-C [phone] [address] [facility] [address]        Equal Access to Services     PINKY HOUSER AH: Hadii aad ku hadasho Soomaali, waaxda luqadaha, qaybta kaalmada adeegyada, rigoberto yu. So [facility] [place] [facility] [phone].    ATENCIÓN: Si habla español, tiene a heredia disposición servicios gratuitos de asistencia lingüística. Llame al [phone].    We comply with applicable federal civil rights laws and [place] laws. We do not discriminate on the basis of race, color, national origin, age, disability, sex, sexual orientation, or gender identity.            Thank you!     Thank you for choosing [facility]  for your care. Our goal is always to provide you with excellent care. Hearing back from our patients is one way we can continue to improve our services. Please take a few minutes to complete the written survey that you may receive in the mail after your visit with us. Thank you!             Your Updated Medication List - Protect others around you: Learn how to safely use, store and throw away your medicines at www.disposemymeds.org.          This list is accurate as of 11/13/18  8:54 AM.  Always use your most recent med list.                   Brand Name Dispense Instructions for use Diagnosis    DHA PO           levothyroxine 50 MCG tablet    SYNTHROID/LEVOTHROID    90 tablet    TAKE 1 TABLET BY MOUTH EVERY DAY    Hypothyroidism, unspecified type       prenatal multivitamin plus iron 27-0.8 MG Tabs per tablet      Take 1 tablet by mouth daily        RA CETIRIZINE 10 MG tablet   Generic drug:  cetirizine      Take 10 mg by mouth

## 2018-11-20 ENCOUNTER — PRENATAL OFFICE VISIT (OUTPATIENT)
Dept: OBGYN | Facility: CLINIC | Age: 33
End: 2018-11-20
Payer: COMMERCIAL

## 2018-11-20 VITALS
OXYGEN SATURATION: 98 % | SYSTOLIC BLOOD PRESSURE: 109 MMHG | WEIGHT: 138.6 LBS | DIASTOLIC BLOOD PRESSURE: 65 MMHG | HEART RATE: 88 BPM | BODY MASS INDEX: 23.81 KG/M2

## 2018-11-20 DIAGNOSIS — E03.9 HYPOTHYROIDISM, UNSPECIFIED TYPE: ICD-10-CM

## 2018-11-20 DIAGNOSIS — K21.9 GASTROESOPHAGEAL REFLUX DISEASE WITHOUT ESOPHAGITIS: ICD-10-CM

## 2018-11-20 DIAGNOSIS — Z87.59 S/P ECTOPIC PREGNANCY: ICD-10-CM

## 2018-11-20 DIAGNOSIS — Z34.93 NORMAL PREGNANCY IN THIRD TRIMESTER: Primary | ICD-10-CM

## 2018-11-20 DIAGNOSIS — O36.8130 DECREASED FETAL MOVEMENTS IN THIRD TRIMESTER, SINGLE OR UNSPECIFIED FETUS: ICD-10-CM

## 2018-11-20 DIAGNOSIS — O99.820 GBS (GROUP B STREPTOCOCCUS CARRIER), +RV CULTURE, CURRENTLY PREGNANT: ICD-10-CM

## 2018-11-20 PROCEDURE — 59025 FETAL NON-STRESS TEST: CPT | Performed by: OBSTETRICS & GYNECOLOGY

## 2018-11-20 PROCEDURE — 99207 ZZC PRENATAL VISIT: CPT | Performed by: OBSTETRICS & GYNECOLOGY

## 2018-11-20 NOTE — MR AVS SNAPSHOT
After Visit Summary   11/20/2018    Lalitha Dey    MRN: [id number]           Patient Information     Date Of Birth          1985        Visit Information        Provider Department      11/20/2018 8:30 AM Tatianna Ch DO [facility]        Today's Diagnoses     Normal pregnancy in third trimester    -  1    GBS (group B Streptococcus carrier), +RV culture, currently pregnant        Gastroesophageal reflux disease without esophagitis        S/P ectopic pregnancy        Hypothyroidism, unspecified type        Decreased fetal movements in third trimester, single or unspecified fetus          Care Instructions    Go to L&D          Follow-ups after your visit        Follow-up notes from your care team     Return in about 1 week (around 11/27/2018).      Your next 10 appointments already scheduled     Nov 23, 2018  8:15 AM CST   (Arrive by 8:00 AM)   US FETAL BIOPHYS PROFILE W/O NON STRESS TEST with MGUS1, MG US TECH   [facility]    [id number] [place] [address] [phone]           How do I prepare for my exam? (Food and drink instructions) Drink four 8-ounce glasses of fluid. Finish drinking an hour before your exam, so you have a full bladder. If you need to empty your bladder before your exam, try to release only a little urine. Then, drink another glass of fluid.  How do I prepare for my exam? (Other instructions) You may have up to two family members in the exam room. If you bring a small child, an adult must be there to care for him or her. No video or camera photography during the procedure.  What should I wear: Wear comfortable clothes.  How long does the exam take: Most ultrasounds take 30 to 60 minutes.  What should I bring: Bring a list of your medicines, including vitamins, minerals and over-the-counter drugs. It is safest to leave personal items at home.  Do I need a :  No  is  needed.  What do I need to tell my doctor: Tell your doctor about any allergies you may have.  What should I do after the exam: No restrictions, you may resume normal activities.  What is this test: An ultrasound uses sound waves to make pictures of the body. Sound waves do not cause pain. The only discomfort may be the pressure of the wand against your skin or full bladder.  Who should I call with questions: If you have any questions, please call the Imaging Department where you will have your exam. Directions, parking instructions, and other information are available on our website, [email or web address].            Nov 27, 2018  9:00 AM CST   ESTABLISHED PRENATAL with Tatianna Ch, DO   [facility] ([facility])    [address] [place] [address] [phone]              Who to contact     If you have questions or need follow up information about today's clinic visit or your schedule please contact [facility] directly at [phone].  Normal or non-critical lab and imaging results will be communicated to you by [employer], letter or phone within 4 business days after the clinic has received the results. If you do not hear from us within 7 days, please contact the clinic through [employer] or phone. If you have a critical or abnormal lab result, we will notify you by phone as soon as possible.  Submit refill requests through [employer] or call your pharmacy and they will forward the refill request to us. Please allow 3 business days for your refill to be completed.          Additional Information About Your Visit        [employer] Information     [employer] gives you secure access to your electronic health record. If you see a primary care provider, you can also send messages to your care team and make appointments. If you have questions, please call your primary care clinic.  If you do not have a primary care provider, please call [phone] and  they will assist you.        Care EveryWhere ID     This is your Care EveryWhere ID. This could be used by other organizations to access your [place] medical records  [id number]        Your Vitals Were     Pulse Last Period Pulse Oximetry BMI (Body Mass Index)          88 02/09/2018 98% 23.81 kg/m2         Blood Pressure from Last 3 Encounters:   11/20/18 109/65   11/13/18 100/67   11/06/18 106/64    Weight from Last 3 Encounters:   11/20/18 138 lb 9.6 oz (62.9 kg)   11/13/18 137 lb 14.4 oz (62.6 kg)   11/06/18 138 lb (62.6 kg)              We Performed the Following     FETAL NON-STRESS TEST        Primary Care Provider Office Phone # Fax #    Kristen M Kehr, PA-C [phone] [address] [address] [facility] [address]        Equal Access to Services     PINKY HOUSER : Hadii aad ku hadasho Soomaali, waaxda luqadaha, qaybta kaalmada adeegyada, waxay idiin hayaan jacques akers . So [facility] [phone].    ATENCIÓN: Si habla español, tiene a heredia disposición servicios gratuitos de asistencia lingüística. Nichelle al [phone].    We comply with applicable federal civil rights laws and [place] laws. We do not discriminate on the basis of race, color, national origin, age, disability, sex, sexual orientation, or gender identity.            Thank you!     Thank you for choosing [facility]  for your care. Our goal is always to provide you with excellent care. Hearing back from our patients is one way we can continue to improve our services. Please take a few minutes to complete the written survey that you may receive in the mail after your visit with us. Thank you!             Your Updated Medication List - Protect others around you: Learn how to safely use, store and throw away your medicines at www.disposemymeds.org.          This list is accurate as of 11/20/18  9:18 AM.  Always use your most recent med list.                   Brand Name Dispense Instructions for use Diagnosis     DHA PO           levothyroxine 50 MCG tablet    SYNTHROID/LEVOTHROID    90 tablet    TAKE 1 TABLET BY MOUTH EVERY DAY    Hypothyroidism, unspecified type       prenatal multivitamin plus iron 27-0.8 MG Tabs per tablet      Take 1 tablet by mouth daily

## 2018-11-20 NOTE — PROGRESS NOTES
33 year old  at 39w5d weeks presents to the clinic for a routine prenatal visit.  Decreased fetal movement  Complains of feeling more pressure.  No vaginal bleeding, leakage of fluid, or contractions   Fundal height=39cm  RNPs=108  CX=/-3  Discussed labor precautions  BPP on Friday  Hypothyroidism=stable  GERD=stable  GBS=Positive    Decreased Fetal Movement  NST IS:  Non Reactive (<2 accl in 20 min)  NST Baseline Rate 140's  Variability:  Average  Accelerations:Present  Variable Decelerations:No  Other Decelerations:Yes - describe:  Possible late deceleration with a long contraction  Contractions: one large contractions     I recommended patient go to L&D for prolonged monitoring and a BPP.  I called and spoke with Charge RNVangie.  All questions answered and patient verbalizes understanding.    Tatianna Ch

## 2018-11-21 ENCOUNTER — NURSE TRIAGE (OUTPATIENT)
Dept: NURSING | Facility: CLINIC | Age: 33
End: 2018-11-21

## 2018-11-21 NOTE — TELEPHONE ENCOUNTER
She said her mother had back pain with labor of her and her brother.  She had some brown vaginal discharge from stripping of her membranes.  She had an hard time sleeping the last two nights.  Kasie Michelle RN-[facility]      Additional Information    Negative: Shock suspected (e.g., cold/pale/clammy skin, too weak to stand, low BP, rapid pulse)    Negative: SEVERE abdominal pain    Negative: Sounds like a life-threatening emergency to the triager    Negative: Major injury to the back (e.g., MVA, fall > 10 feet or 3 meters, penetrating injury, etc.)    Negative: Followed a tailbone injury    Negative: [1] Having contractions or other symptoms of labor AND [2] >= 37 weeks pregnant (i.e., term pregnancy)    Negative: [1] Having contractions or other symptoms of labor AND [2] < 37 weeks pregnant (i.e., )    Negative: [1] Abdominal pain AND [2] pregnant > 20 weeks    Negative: [1] Abdominal pain AND [2] pregnant < 20 weeks    Negative: [1] Pain in the upper back AND [2] overlies the rib cage    Negative: [1] Pain in the upper back AND [2] worsened by coughing (or clearly increases with breathing)    Negative: [1] Unable to urinate (or only a few drops) > 4 hours AND     [2] bladder feels very full (e.g., palpable bladder or strong urge to urinate)    Negative: Numbness in groin or rectal area (i.e., loss of sensation)    Negative: Leakage of fluid from vagina    Negative: [1] Pregnant 23 or more weeks AND [2] baby is moving less today (e.g., kick count < 5 in 1 hour or < 10 in 2 hours)    Negative: Weakness of a leg or foot (e.g., unable to bear weight, dragging foot)    Negative: Unable to walk    Negative: Patient sounds very sick or weak to the triager    Negative: [1] SEVERE back pain (e.g., excruciating, unable to do any normal activities) AND [2] not improved 2 hours after pain medicine     Pain at 6    Negative: [1] Pain radiates into the thigh or further down the leg AND [2] both  "legs    Negative: [1] Flank pain (i.e., in side, below ribs and above hip) AND [2] fever > 100.5 F (38.1 C)    Negative: Pain or burning with urination    Negative: Numbness in a leg or foot (i.e., loss of sensation)    Negative: High-risk adult (e.g., history of cancer, HIV, or IV drug abuse)    Negative: Flank pain  (Exception: pain that is only present with movement)    Negative: Rash in same area as pain (may be described as \"small blisters\")    Negative: Blood in urine (red, pink, or tea-colored)    Negative: [1] Increase in vaginal discharge AND [2] < 37 weeks pregnant (i.e., )    Negative: [1] MODERATE back pain (e.g., interferes with normal activities or sleep) AND [2] present > 3 days    Negative: [1] Pain radiates into the thigh or further down the leg AND [2] one leg    Negative: Back pain present > 2 weeks    Negative: Caused by a twisting, bending, or lifting injury (all triage questions negative)    Negative: Caused by overuse from recent vigorous activity (e.g., exercise, gardening, lifting and carrying, sports)  (all triage questions negative)    Back pain  (all triage questions negative)    Protocols used: PREGNANCY - BACK PAIN-ADULT-      "

## 2018-11-23 ENCOUNTER — RADIANT APPOINTMENT (OUTPATIENT)
Dept: ULTRASOUND IMAGING | Facility: CLINIC | Age: 33
End: 2018-11-23
Attending: OBSTETRICS & GYNECOLOGY
Payer: COMMERCIAL

## 2018-11-23 DIAGNOSIS — O48.0 POST-TERM PREGNANCY, 40-42 WEEKS OF GESTATION: ICD-10-CM

## 2018-11-23 PROCEDURE — 76819 FETAL BIOPHYS PROFIL W/O NST: CPT | Performed by: STUDENT IN AN ORGANIZED HEALTH CARE EDUCATION/TRAINING PROGRAM

## 2018-11-26 ENCOUNTER — MYC MEDICAL ADVICE (OUTPATIENT)
Dept: OBGYN | Facility: OTHER | Age: 33
End: 2018-11-26

## 2018-11-26 ENCOUNTER — NURSE TRIAGE (OUTPATIENT)
Dept: NURSING | Facility: CLINIC | Age: 33
End: 2018-11-26

## 2018-11-26 ENCOUNTER — TELEPHONE (OUTPATIENT)
Dept: OBGYN | Facility: CLINIC | Age: 33
End: 2018-11-26

## 2018-11-26 ENCOUNTER — PRENATAL OFFICE VISIT (OUTPATIENT)
Dept: OBGYN | Facility: OTHER | Age: 33
End: 2018-11-26
Payer: COMMERCIAL

## 2018-11-26 VITALS
WEIGHT: 138 LBS | DIASTOLIC BLOOD PRESSURE: 62 MMHG | HEART RATE: 80 BPM | SYSTOLIC BLOOD PRESSURE: 110 MMHG | BODY MASS INDEX: 23.71 KG/M2

## 2018-11-26 DIAGNOSIS — K21.9 GASTROESOPHAGEAL REFLUX DISEASE WITHOUT ESOPHAGITIS: ICD-10-CM

## 2018-11-26 DIAGNOSIS — E03.9 HYPOTHYROIDISM, UNSPECIFIED TYPE: ICD-10-CM

## 2018-11-26 DIAGNOSIS — O28.8 NON-REACTIVE NST (NON-STRESS TEST): ICD-10-CM

## 2018-11-26 DIAGNOSIS — O99.820 GBS (GROUP B STREPTOCOCCUS CARRIER), +RV CULTURE, CURRENTLY PREGNANT: ICD-10-CM

## 2018-11-26 DIAGNOSIS — O48.0 POST-TERM PREGNANCY, 40-42 WEEKS OF GESTATION: Primary | ICD-10-CM

## 2018-11-26 PROCEDURE — 99207 ZZC PRENATAL VISIT: CPT | Performed by: OBSTETRICS & GYNECOLOGY

## 2018-11-26 PROCEDURE — 59025 FETAL NON-STRESS TEST: CPT | Performed by: OBSTETRICS & GYNECOLOGY

## 2018-11-26 NOTE — TELEPHONE ENCOUNTER
[facility]    Phone Message  Spoke to the doctor on call this morning at 2, he said to monitor it - progressed was supposed to come in - now she is just having severe diarrhea instead of gas pains -  just doesn't feel good at all, wants to make sure she isn't hurting the baby - 40 weeks and 4 days -   May a detailed message be left on voicemail: yes    Reason for Call: Other: Spoke to the doctor on call this morning at 2, he said to monitor it - progressed was supposed to come in - now she is just having severe diarrhea instead of gas pains -  just doesn't feel good at all, wants to make sure she isn't hurting the baby - 40 weeks and 4 days -      Action Taken: Message routed to:  [facility] p [phone]

## 2018-11-26 NOTE — MR AVS SNAPSHOT
After Visit Summary   11/26/2018    Lalitha Dey    MRN: [id number]           Patient Information     Date Of Birth          1985        Visit Information        Provider Department      11/26/2018 1:00 PM Tatianna Ch DO [facility]        Today's Diagnoses     Post-term pregnancy, 40-42 weeks of gestation    -  1    Non-reactive NST (non-stress test)        Gastroesophageal reflux disease without esophagitis        GBS (group B Streptococcus carrier), +RV culture, currently pregnant        Hypothyroidism, unspecified type          Care Instructions    Go to L&D          Follow-ups after your visit        Future tests that were ordered for you today     Open Future Orders        Priority Expected Expires Ordered    US Fetal Biophys Prof w/o Non Stress Test Routine  11/26/2019 11/26/2018            Who to contact     If you have questions or need follow up information about today's clinic visit or your schedule please contact [facility] directly at [phone].  Normal or non-critical lab and imaging results will be communicated to you by [employer], letter or phone within 4 business days after the clinic has received the results. If you do not hear from us within 7 days, please contact the clinic through [employer] or phone. If you have a critical or abnormal lab result, we will notify you by phone as soon as possible.  Submit refill requests through [employer] or call your pharmacy and they will forward the refill request to us. Please allow 3 business days for your refill to be completed.          Additional Information About Your Visit        [employer] Information     [employer] gives you secure access to your electronic health record. If you see a primary care provider, you can also send messages to your care team and make appointments. If you have questions, please call your primary care clinic.  If you do not have a primary care provider, please call  [phone] and they will assist you.        Care EveryWhere ID     This is your Care EveryWhere ID. This could be used by other organizations to access your [place] medical records  [id number]        Your Vitals Were     Pulse Last Period BMI (Body Mass Index)             80 02/09/2018 23.71 kg/m2          Blood Pressure from Last 3 Encounters:   11/26/18 110/62   11/20/18 109/65   11/13/18 100/67    Weight from Last 3 Encounters:   11/26/18 138 lb (62.6 kg)   11/20/18 138 lb 9.6 oz (62.9 kg)   11/13/18 137 lb 14.4 oz (62.6 kg)              We Performed the Following     FETAL NON-STRESS TEST        Primary Care Provider Office Phone # Fax #    Kristen M Kehr, PA-C [phone] [address] [facility] [address]        Equal Access to Services     PINKY HOUSER : Hadii aad ku hadasho Soomaali, waaxda luqadaha, qaybta kaalmada adeegyada, rigoberto akers . So [facility] [phone].    ATENCIÓN: Si habla español, tiene a heredia disposición servicios gratuitos de asistencia lingüística. Llame al [phone].    We comply with applicable federal civil rights laws and [place] laws. We do not discriminate on the basis of race, color, national origin, age, disability, sex, sexual orientation, or gender identity.            Thank you!     Thank you for choosing [facility]  for your care. Our goal is always to provide you with excellent care. Hearing back from our patients is one way we can continue to improve our services. Please take a few minutes to complete the written survey that you may receive in the mail after your visit with us. Thank you!             Your Updated Medication List - Protect others around you: Learn how to safely use, store and throw away your medicines at www.disposemymeds.org.          This list is accurate as of 11/26/18  2:11 PM.  Always use your most recent med list.                   Brand Name Dispense Instructions for use Diagnosis     DHA PO           levothyroxine 50 MCG tablet    SYNTHROID/LEVOTHROID    90 tablet    TAKE 1 TABLET BY MOUTH EVERY DAY    Hypothyroidism, unspecified type       prenatal multivitamin plus iron 27-0.8 MG Tabs per tablet      Take 1 tablet by mouth daily

## 2018-11-26 NOTE — TELEPHONE ENCOUNTER
Return call to patient. Patient reported at 1230 she woke with abdominal pain that caused her stomach to tighten and thought they were ctx. Patient stated she was timing the cramping and was happening q 5 mins. Patient stated she spoke to on call MD. Patient stated was advised to monitor sx's and see if she could fall back to sleep. Reported taking a bath & Tums and then btwn 1406-7045 diarrhea started and no longer having flatus. Now patient realizes she was having gas pains earlier and they are lessening. Reported having diarrhea 8 times-about 80% water. No pus, mucous, blood, frothy, green stool. Mostly brown with a little yellow. Afebrile. Patient does not think she ate bad food but did attempt spicy food in attempt to precipitate labor. Reported lost mucous plug 2 days ago and then saw a bit more yesterday. Patient reports  is ill with vomiting. Baby active. Explained no red sx's. Encouraged BRAT diet, increase fld in take to stay hydrated, rest as she is able, monitor for fever or stool that could indicate infection, call back if sx's worsen or change. Verified appt with Dr. Ch tomorrow. Patient agreed to follow plan. Danii Bond RN, BAN

## 2018-11-26 NOTE — TELEPHONE ENCOUNTER
"  Reason for Disposition    MODERATE-SEVERE abdominal pain    Additional Information    Negative: Passed out (i.e., lost consciousness, collapsed and was not responding)    Negative: Shock suspected (e.g., cold/pale/clammy skin, too weak to stand, low BP, rapid pulse)    Negative: Difficult to awaken or acting confused  (e.g., disoriented, slurred speech)    Negative: [1] SEVERE abdominal pain (e.g., excruciating) AND [2] constant AND [3] present > 1 hour    Negative: Severe bleeding (e.g., continuous red blood from vagina, or large blood clots)    Negative: Umbilical cord hanging out of the vagina (shiny, white, curled appearance, \"like telephone cord\")    Negative: Uncontrollable urge to push (i.e., feels like baby is coming out now)    Negative: Can see baby    Negative: Sounds like a life-threatening emergency to the triager    Negative: Pregnant < 37 weeks (i.e., )    Negative: [1] Uncertain delivery date AND [2] possibly pregnant < 37 weeks (i.e., )    Negative: [1] First baby (primipara) AND [2] contractions < 6 minutes apart  AND [3] present 2 hours    Negative: [1] History of prior delivery (multipara) AND [2] contractions < 10 minutes apart AND [3] present 1 hour    Negative: [1] History of rapid prior delivery AND [2] contractions < 10 minutes apart    Negative: [1] Leakage of fluid from vagina AND [2] green or brown in color    Negative: [1] Leakage of fluid from vagina AND [2] leakage started > 4 hours ago    Negative: Vaginal bleeding or spotting    Negative: Baby moving less today (e.g., kick count < 5 in 1 hour or < 10 in 2 hours)    Negative: New hand or face swelling    Protocols used: PREGNANCY - LABOR-ADULT-  Caller is pregnant, NINA is 20. Caller states she is having pain in her abdominal area for over 1.5 hours. Caller states she is not sure if it is gas or contractions. Triage guidelines recommend to call provider. Triager called out to answering service, left message for " on call provider Dr. Jernigan to call patient at [phone]. Caller advised to call back within 20 minutes if no return call. Caller verbalized and understands directives.

## 2018-11-26 NOTE — PROGRESS NOTES
33 year old  at 40w4d weeks presents to the clinic for a routine prenatal visit.  Complains of feeling more pressure.  No vaginal bleeding or leakage of fluid.  She did have regular contractions last night.  Fundal height=40cm  FYNg=773's by NST  CX=2/80/-3  Discussed labor precautions  Hypothyroidism=stable  GERD=stable  GBS=Positive    Postdates  NST IS:  Non Reactive (<2 accl in 20 min)  NST Baseline Rate 130's  Variability:  Average  Accelerations:Present  Variable Decelerations:No  Other Decelerations:No  Contractions: One    I discussed with patient the need for a BPP due to NRNST.  No ultrasound appts available here today so patient will go to L&D.  We discussed possible induction if her BPP is not 8/8.  Otherwise, patient will likely be induced on Wednesday.      Tatianna Ch

## 2018-12-13 ENCOUNTER — MYC MEDICAL ADVICE (OUTPATIENT)
Dept: OBGYN | Facility: CLINIC | Age: 33
End: 2018-12-13

## 2018-12-17 ENCOUNTER — MYC MEDICAL ADVICE (OUTPATIENT)
Dept: OBGYN | Facility: CLINIC | Age: 33
End: 2018-12-17

## 2018-12-17 NOTE — TELEPHONE ENCOUNTER
Left message asking pt to call back to clinic if she would like to be seen by Dr. Day today at 1430. Explained if she feels comfortable waiting until her appt tomorrow with Dr. Ch tomorrow then there is no reason to call back. If she wants to speak to a nurse she can call back. Left clinic call back phone number and RN hours. Danii Bond RN, [facility]

## 2018-12-17 NOTE — TELEPHONE ENCOUNTER
Will close encounter since patient has been taken care of    Toyin Gutiérrez  [facility]

## 2018-12-17 NOTE — TELEPHONE ENCOUNTER
Rn's to triage-    Left message for return call, when call is returned please inform that we were able to work patient in tomorrow with Dr. Ch at 8:30 in         Toyin Gutiérrez  Women[facility]

## 2018-12-18 ENCOUNTER — OFFICE VISIT (OUTPATIENT)
Dept: OBGYN | Facility: CLINIC | Age: 33
End: 2018-12-18
Payer: COMMERCIAL

## 2018-12-18 ENCOUNTER — TELEPHONE (OUTPATIENT)
Dept: OBGYN | Facility: CLINIC | Age: 33
End: 2018-12-18

## 2018-12-18 VITALS
DIASTOLIC BLOOD PRESSURE: 63 MMHG | WEIGHT: 124.56 LBS | HEART RATE: 71 BPM | SYSTOLIC BLOOD PRESSURE: 97 MMHG | BODY MASS INDEX: 21.4 KG/M2

## 2018-12-18 DIAGNOSIS — M25.551 HIP PAIN, RIGHT: Primary | ICD-10-CM

## 2018-12-18 DIAGNOSIS — Z98.891 S/P CESAREAN SECTION: ICD-10-CM

## 2018-12-18 PROBLEM — O99.820 GBS (GROUP B STREPTOCOCCUS CARRIER), +RV CULTURE, CURRENTLY PREGNANT: Status: RESOLVED | Noted: 2018-10-25 | Resolved: 2018-12-18

## 2018-12-18 PROBLEM — O48.0 POST-TERM PREGNANCY, 40-42 WEEKS OF GESTATION: Status: RESOLVED | Noted: 2018-11-13 | Resolved: 2018-12-18

## 2018-12-18 PROBLEM — Z34.91 NORMAL PREGNANCY IN FIRST TRIMESTER: Status: RESOLVED | Noted: 2018-04-19 | Resolved: 2018-12-18

## 2018-12-18 PROCEDURE — 99213 OFFICE O/P EST LOW 20 MIN: CPT | Performed by: OBSTETRICS & GYNECOLOGY

## 2018-12-18 NOTE — PATIENT INSTRUCTIONS
Please call if you any questions.    [facility]  [address] [place]   [address]  [phone]        Tatianna Ch

## 2018-12-18 NOTE — PROGRESS NOTES
Subjective  33 year old non-pregnant female presents today as a post-op from a c section on 11/27/18.  Patient states she has had hip pain ever since a week post partum.  She has a lot of stairs in her house and that makes the pain worse.   5/10 pain with ambulation.  Patient is taking Tylenol which is not really helping.  No pain with sitting.  Heat helps.  She does wear the abdominal binder and she feels that that helps somewhat.  She has to brace herself when she stands up because she feels unsteady.  No pain at the site of the epidural.  No rash or trauma.  I recommended physical therapy and patient is in agreement.  We discussed possible referral to Ortho if the pain does not improve.      ROS: 10 point ROS neg other than the symptoms noted above in the HPI.  Past Medical History:   Diagnosis Date     GERD (gastroesophageal reflux disease)      Hypothyroid      Intradermal nevus 03/27/2007    right arm-irritated intradermal nevus     Past Surgical History:   Procedure Laterality Date     NO HISTORY OF SURGERY       SALPINGECTOMY Left 11/28/2017    Ectopic pregnancy     Family History   Problem Relation Age of Onset     Neurologic Disorder Mother         MSAND LUPUS     Thyroid Disease Mother      Depression Father      Thyroid Disease Father      Depression Brother      Thyroid Disease Maternal Grandmother      Cancer Maternal Grandmother      Thyroid Disease Maternal Grandfather      Thyroid Disease Paternal Grandmother      Thyroid Disease Paternal Grandfather      Diabetes No family hx of      Hypertension No family hx of      Cerebrovascular Disease No family hx of      Glaucoma No family hx of      Macular Degeneration No family hx of      Social History     Tobacco Use     Smoking status: Former Smoker     Last attempt to quit: 8/1/2008     Years since quitting: 10.3     Smokeless tobacco: Never Used     Tobacco comment: Lives in smoke free household   Substance Use Topics     Alcohol use: No          Objective  Vitals: BP 97/63 (BP Location: Right arm, Patient Position: Chair, Cuff Size: Adult Regular)   Pulse 71   Wt 56.5 kg (124 lb 9 oz)   LMP 02/09/2018   BMI 21.40 kg/m    BMI= Body mass index is 21.4 kg/m .    Musculo=some discomfort over right SI joint       Assessment  1.)  S/p Primary c section on 11/27 for FTP  2.)  Right hip pain      Plan  1.)  Physical Therapy referral placed  2.)  Follow-up for post partum visit      Tatianna Ch

## 2018-12-20 ENCOUNTER — THERAPY VISIT (OUTPATIENT)
Dept: PHYSICAL THERAPY | Facility: CLINIC | Age: 33
End: 2018-12-20
Payer: COMMERCIAL

## 2018-12-20 DIAGNOSIS — M25.551 HIP PAIN, RIGHT: ICD-10-CM

## 2018-12-20 DIAGNOSIS — M53.3 SACROILIAC JOINT PAIN: ICD-10-CM

## 2018-12-20 PROCEDURE — 97110 THERAPEUTIC EXERCISES: CPT | Mod: GP | Performed by: PHYSICAL THERAPIST

## 2018-12-20 PROCEDURE — 97161 PT EVAL LOW COMPLEX 20 MIN: CPT | Mod: GP | Performed by: PHYSICAL THERAPIST

## 2018-12-20 PROCEDURE — 97140 MANUAL THERAPY 1/> REGIONS: CPT | Mod: GP | Performed by: PHYSICAL THERAPIST

## 2018-12-20 ASSESSMENT — ACTIVITIES OF DAILY LIVING (ADL)
WALKING_INITIALLY: EXTREME DIFFICULTY
WALKING_DOWN_STEEP_HILLS: EXTREME DIFFICULTY
GOING_DOWN_1_FLIGHT_OF_STAIRS: MODERATE DIFFICULTY
PUTTING_ON_SOCKS_AND_SHOES: MODERATE DIFFICULTY
WALKING_UP_STEEP_HILLS: EXTREME DIFFICULTY
ROLLING_OVER_IN_BED: EXTREME DIFFICULTY
GETTING_INTO_AND_OUT_OF_AN_AVERAGE_CAR: MODERATE DIFFICULTY
HOS_ADL_ITEM_SCORE_TOTAL: 22
TWISTING/PIVOTING_ON_INVOLVED_LEG: EXTREME DIFFICULTY
WALKING_APPROXIMATELY_10_MINUTES: EXTREME DIFFICULTY
WALKING_15_MINUTES_OR_GREATER: MODERATE DIFFICULTY
HOS_ADL_HIGHEST_POTENTIAL_SCORE: 68
HOS_ADL_COUNT: 17
HOW_WOULD_YOU_RATE_YOUR_CURRENT_LEVEL_OF_FUNCTION_DURING_YOUR_USUAL_ACTIVITIES_OF_DAILY_LIVING_FROM_0_TO_100_WITH_100_BEING_YOUR_LEVEL_OF_FUNCTION_PRIOR_TO_YOUR_HIP_PROBLEM_AND_0_BEING_THE_INABILITY_TO_PERFORM_ANY_OF_YOUR_USUAL_DAILY_ACTIVITIES?: 60
LIGHT_TO_MODERATE_WORK: EXTREME DIFFICULTY
DEEP_SQUATTING: EXTREME DIFFICULTY
HEAVY_WORK: EXTREME DIFFICULTY
STANDING_FOR_15_MINUTES: MODERATE DIFFICULTY
SITTING_FOR_15_MINUTES: NO DIFFICULTY AT ALL
GETTING_INTO_AND_OUT_OF_A_BATHTUB: EXTREME DIFFICULTY
STEPPING_UP_AND_DOWN_CURBS: MODERATE DIFFICULTY
GOING_UP_1_FLIGHT_OF_STAIRS: EXTREME DIFFICULTY
RECREATIONAL_ACTIVITIES: EXTREME DIFFICULTY
HOS_ADL_SCORE(%): 32.35

## 2018-12-20 NOTE — PROGRESS NOTES
Patient did not return for follow up treatments.  Goal status and current objective information is therefore unknown.  Discharge from PT services at this time for this episode of treatment. Please see attached documentation under this episode of care for further information including dates of service, start of care date, referring physician, Dx, treatment plan, treatments, etc.    Please contact me with any questions or concerns.    Thank you for your referral.  Melani Morris, PT, T      [facility] Initial Evaluation  Subjective:  The history is provided by the patient. No  was used.   Lalitha Dey is a 33 year old female with a right hip condition.  Condition occurred with:  Other reason.  Condition occurred: other.  This is a new condition  11/27/2018 had C section  .    Site of Pain: R SI joint, radiates into the R lower leg, lateral side.  Radiates to:  Lower leg.  Pain is described as sharp and aching and is constant and intermittent and reported as 5/10.   Pain is worse during the day.  Symptoms are exacerbated by descending stairs, ascending stairs, certain positions and activity and relieved by rest.          General health as reported by patient is good.  Past medical history: thyroid problems.  Medical allergies: no.  Other surgeries include:  Other (C section 11/27/2018).    Current occupation is - off work for current maternity leave  .    Primary job tasks include:  Prolonged sitting.    Barriers include:  None as reported by the patient.    Red flags:  None as reported by the patient.                        Objective:  System         Lumbar/SI Evaluation              Lumbar Palpation:      Tenderness not present at Left:    PSIS or ASIS  Tenderness present at Right: PSIS  Tenderness not present at Right:  ASIS    Lumbar Provocation:  Lumbar provocation: + SI provocation test of thigh thrust, sacral thrust         SI joint/Sacrum:             Sacral conclusion left:  Posterior inominate  Sacral conclusion right:  Anterior inominate                                               Ronda Lumbar Evaluation    Posture:  Sitting: fair  Standing: fair    Lateral Shift: no      Movement Loss:  Flexion (Flex): min  Extension (EXT): mod      Test Movements:  FIS: During: no effect  After: no effect    Repeat FIS: During: no effect  After: no effect    EIS: During: no effect  After: no effect    Repeat EIS: During: no effect  After: no effect  Mechanical Response: IncROM              Principle of Treatment:          Other: SI joint localized pain, other treatment model                                       ROS    Assessment/Plan:    Patient is a 33 year old female with right side hip complaints.    Patient has the following significant findings with corresponding treatment plan.                Diagnosis 1:  SI Joint Pain  Pain -  hot/cold therapy, electric stimulation, manual therapy, self management, education, directional preference exercise and home program  Decreased ROM/flexibility - manual therapy and therapeutic exercise  Decreased joint mobility - manual therapy and therapeutic exercise  Impaired gait - gait training, assistive devices and home program    Therapy Evaluation Codes:   1) History comprised of:   Personal factors that impact the plan of care:      None.    Comorbidity factors that impact the plan of care are:      recent , thyroid.     Medications impacting care: pain, thyroid.  2) Examination of Body Systems comprised of:   Body structures and functions that impact the plan of care:      R SI Joint.   Activity limitations that impact the plan of care are:      standing, walking, lifting, going up/down stairs.  3) Clinical presentation characteristics are:   Stable/Uncomplicated.  4) Decision-Making    Low complexity using standardized patient assessment instrument and/or measureable assessment of functional  outcome.  Cumulative Therapy Evaluation is: Low complexity.    Previous and current functional limitations:  (See Goal Flow Sheet for this information)    Short term and Long term goals: (See Goal Flow Sheet for this information)     Communication ability:  Patient appears to be able to clearly communicate and understand verbal and written communication and follow directions correctly.  Treatment Explanation - The following has been discussed with the patient:   RX ordered/plan of care  Anticipated outcomes  Possible risks and side effects  This patient would benefit from PT intervention to resume normal activities.   Rehab potential is good.    Frequency:  1 X week, once daily  Duration:  for 6 weeks  Discharge Plan:  Achieve all LTG.  Independent in home treatment program.  Reach maximal therapeutic benefit.    Please refer to the daily flowsheet for treatment today, total treatment time and time spent performing 1:1 timed codes.

## 2019-01-02 ENCOUNTER — ANCILLARY PROCEDURE (OUTPATIENT)
Dept: GENERAL RADIOLOGY | Facility: CLINIC | Age: 34
End: 2019-01-02
Payer: COMMERCIAL

## 2019-01-02 ENCOUNTER — NURSE TRIAGE (OUTPATIENT)
Dept: NURSING | Facility: CLINIC | Age: 34
End: 2019-01-02

## 2019-01-02 ENCOUNTER — OFFICE VISIT (OUTPATIENT)
Dept: FAMILY MEDICINE | Facility: CLINIC | Age: 34
End: 2019-01-02
Payer: COMMERCIAL

## 2019-01-02 VITALS
WEIGHT: 119 LBS | HEART RATE: 66 BPM | HEIGHT: 64 IN | TEMPERATURE: 97.7 F | OXYGEN SATURATION: 96 % | SYSTOLIC BLOOD PRESSURE: 92 MMHG | DIASTOLIC BLOOD PRESSURE: 60 MMHG | RESPIRATION RATE: 16 BRPM | BODY MASS INDEX: 20.32 KG/M2

## 2019-01-02 DIAGNOSIS — B37.31 YEAST INFECTION OF THE VAGINA: ICD-10-CM

## 2019-01-02 DIAGNOSIS — R10.31 RLQ ABDOMINAL PAIN: Primary | ICD-10-CM

## 2019-01-02 DIAGNOSIS — R10.31 RLQ ABDOMINAL PAIN: ICD-10-CM

## 2019-01-02 LAB
BASOPHILS # BLD AUTO: 0 10E9/L (ref 0–0.2)
BASOPHILS NFR BLD AUTO: 0.1 %
DIFFERENTIAL METHOD BLD: ABNORMAL
EOSINOPHIL # BLD AUTO: 0.1 10E9/L (ref 0–0.7)
EOSINOPHIL NFR BLD AUTO: 1.6 %
ERYTHROCYTE [DISTWIDTH] IN BLOOD BY AUTOMATED COUNT: 13.8 % (ref 10–15)
HCT VFR BLD AUTO: 37.1 % (ref 35–47)
HGB BLD-MCNC: 11.6 G/DL (ref 11.7–15.7)
LYMPHOCYTES # BLD AUTO: 1.7 10E9/L (ref 0.8–5.3)
LYMPHOCYTES NFR BLD AUTO: 25 %
MCH RBC QN AUTO: 27 PG (ref 26.5–33)
MCHC RBC AUTO-ENTMCNC: 31.3 G/DL (ref 31.5–36.5)
MCV RBC AUTO: 87 FL (ref 78–100)
MONOCYTES # BLD AUTO: 0.5 10E9/L (ref 0–1.3)
MONOCYTES NFR BLD AUTO: 6.8 %
NEUTROPHILS # BLD AUTO: 4.6 10E9/L (ref 1.6–8.3)
NEUTROPHILS NFR BLD AUTO: 66.5 %
PLATELET # BLD AUTO: 223 10E9/L (ref 150–450)
RBC # BLD AUTO: 4.29 10E12/L (ref 3.8–5.2)
SPECIMEN SOURCE: ABNORMAL
WBC # BLD AUTO: 6.9 10E9/L (ref 4–11)
WET PREP SPEC: ABNORMAL

## 2019-01-02 PROCEDURE — 36415 COLL VENOUS BLD VENIPUNCTURE: CPT | Performed by: FAMILY MEDICINE

## 2019-01-02 PROCEDURE — 74019 RADEX ABDOMEN 2 VIEWS: CPT | Mod: FY

## 2019-01-02 PROCEDURE — 85025 COMPLETE CBC W/AUTO DIFF WBC: CPT | Performed by: FAMILY MEDICINE

## 2019-01-02 PROCEDURE — 87210 SMEAR WET MOUNT SALINE/INK: CPT | Performed by: FAMILY MEDICINE

## 2019-01-02 PROCEDURE — 99214 OFFICE O/P EST MOD 30 MIN: CPT | Performed by: FAMILY MEDICINE

## 2019-01-02 RX ORDER — FLUCONAZOLE 150 MG/1
150 TABLET ORAL ONCE
Qty: 2 TABLET | Refills: 0 | Status: SHIPPED | OUTPATIENT
Start: 2019-01-02 | End: 2019-01-02

## 2019-01-02 ASSESSMENT — MIFFLIN-ST. JEOR: SCORE: 1239.78

## 2019-01-02 NOTE — PROGRESS NOTES
SUBJECTIVE:   Lalitha Dey is a 33 year old female who presents to clinic today for the following health issues:    Concern - Pelvic pain   Onset: noticed it last night; no trauma. Pt. reported she had a , noting that.     Description:   Right pelvic/groin area     Intensity: 7.5/10 when it occurred last night, 3-4 now with ibuprofen/tylenol use     Progression of Symptoms:  Constant, intermittent     Accompanying Signs & Symptoms:  (Low grade fever) Chills, sweats, difficulty moving around    Previous history of similar problem:   No.     Therapies Tried and outcome: Hot showers, ibuprofen and tylenol - helped.     -Patient woke up with chills, nausea, and right lower quadrant pain last night rating at 7/10, took some tyelenol and fell back to sleep. She woke up a few hours later feeling hot. She had a  five weeks ago and is wondering if her pain could be related to that. This was patient's first delivery. She did not fully dilate so they did . She has been doing physical therapy for tailbone pain after the delivery. Has not had intercourse since delivery, patient is breastfeeding   -Pain is described as constant and patient reports feeling like she has a bruise. Flexion of right leg relieves pain, deep inspiration exacerbates pain   -Has noticed watery yellow vaginal discharge over the last week. She had bleeding s/p  for about three weeks that stopped and then this discharge started   -Complains of pelvic cramping/back cramps over the past week but those are gone now  -Patient has had constipation since giving birth five weeks ago, has bowel movements every 2-3 days, last BM over 2 days ago  -Denies SOB, appetite changes,  symptoms, diarrhea, hematochezia, rash   -Has appointment with OBGYN for next week post-partum visit  -Has history of left ectopic pregnancy one year ago  -Patient has history of stomach ulcer over ten years ago but these symptoms feel very different     -Denies recent ill contact or travel   - she is breast feeding around the clock    Problem list and histories reviewed & adjusted, as indicated.  Additional history: as documented    Patient Active Problem List   Diagnosis     CARDIOVASCULAR SCREENING; LDL GOAL LESS THAN 160     Seasonal allergic rhinitis     Acne     Conjunctivitis, allergic     Dry eyes     Blepharitis     Hypothyroidism     S/P ectopic pregnancy     Need for Tdap vaccination     Gastroesophageal reflux disease without esophagitis     S/P  section     Sacroiliac joint pain     Past Surgical History:   Procedure Laterality Date     NO HISTORY OF SURGERY       SALPINGECTOMY Left 2017    Ectopic pregnancy       Social History     Tobacco Use     Smoking status: Former Smoker     Last attempt to quit: 2008     Years since quitting: 10.4     Smokeless tobacco: Never Used     Tobacco comment: Lives in smoke free household   Substance Use Topics     Alcohol use: No     Family History   Problem Relation Age of Onset     Neurologic Disorder Mother         MSAND LUPUS     Thyroid Disease Mother      Depression Father      Thyroid Disease Father      Depression Brother      Thyroid Disease Maternal Grandmother      Cancer Maternal Grandmother      Thyroid Disease Maternal Grandfather      Thyroid Disease Paternal Grandmother      Thyroid Disease Paternal Grandfather      Diabetes No family hx of      Hypertension No family hx of      Cerebrovascular Disease No family hx of      Glaucoma No family hx of      Macular Degeneration No family hx of            Reviewed and updated as needed this visit by clinical staff  Tobacco  Allergies  Meds       Reviewed and updated as needed this visit by Provider         ROS:  Constitutional, HEENT, cardiovascular, pulmonary, gi and gu systems are negative, except as otherwise noted.    This document serves as a record of the services and decisions personally performed by GARCIA FINK.  "It was created on his/her behalf by Zoraida Chavez, a trained medical scribe. The creation of this document is based on the provider's statements to the medical scribe. Zoraida Chavez, 2019 3:32 PM  OBJECTIVE:     BP 92/60 (BP Location: Right arm, Patient Position: Sitting, Cuff Size: Adult Small)   Pulse 66   Temp 97.7  F (36.5  C) (Oral)   Resp 16   Ht 1.626 m (5' 4\")   Wt 54 kg (119 lb)   SpO2 96%   BMI 20.43 kg/m    Body mass index is 20.43 kg/m .  GENERAL: healthy, alert and no distress  NECK: no adenopathy, no asymmetry, masses, or scars and thyroid normal to palpation  RESP: lungs clear to auscultation - no rales, rhonchi or wheezes  CV: regular rate and rhythm, normal S1 S2, no S3 or S4, no murmur, click or rub, no peripheral edema and peripheral pulses strong  ABDOMEN: soft, moderate tender upper abdomen, suprapubic and RLQ w/ no guarding or rebound.  scar clean, dry intact and pain not directly in this region. no hepatosplenomegaly, no masses and bowel sounds normal   (female): mild vaginal erythema, normal female external genitalia, normal urethral meatus, vaginal mucosa, normal cervix/adnexa/uterus without masses. Clear yellowish discharge. No adnexal or cervical tenderness, mild suprauterine tenderness on bimanual exam.   MS: no gross musculoskeletal defects noted, no edema  PSYCH: mentation appears normal, affect normal/bright    Abd XR: moderate amount of stool in lower abdomen.  Xray personally reviewed and evaluated by me    Diagnostic Test Results:  Results for orders placed or performed in visit on 19 (from the past 24 hour(s))   CBC with platelets differential   Result Value Ref Range    WBC 6.9 4.0 - 11.0 10e9/L    RBC Count 4.29 3.8 - 5.2 10e12/L    Hemoglobin 11.6 (L) 11.7 - 15.7 g/dL    Hematocrit 37.1 35.0 - 47.0 %    MCV 87 78 - 100 fl    MCH 27.0 26.5 - 33.0 pg    MCHC 31.3 (L) 31.5 - 36.5 g/dL    RDW 13.8 10.0 - 15.0 %    Platelet Count 223 150 - 450 " 10e9/L    % Neutrophils 66.5 %    % Lymphocytes 25.0 %    % Monocytes 6.8 %    % Eosinophils 1.6 %    % Basophils 0.1 %    Absolute Neutrophil 4.6 1.6 - 8.3 10e9/L    Absolute Lymphocytes 1.7 0.8 - 5.3 10e9/L    Absolute Monocytes 0.5 0.0 - 1.3 10e9/L    Absolute Eosinophils 0.1 0.0 - 0.7 10e9/L    Absolute Basophils 0.0 0.0 - 0.2 10e9/L    Diff Method Automated Method    Wet prep   Result Value Ref Range    Specimen Description Cervical     Wet Prep No clue cells seen     Wet Prep No Trichomonas seen     Wet Prep Yeast seen (A)      ASSESSMENT/PLAN:     1. RLQ abdominal pain  Does not appear to have an acute abd this evening and nl wbc. It is possible constipation could be triggering pain, but a bit unusual that it would wake her from sleep. DDX includes post-op infection/endometritis/bowel obstruction/early appendicitis/nephrolithiasis/etc. If pain does not improve with laxative, will need CT tomorrow. Might need serial exams. She will update us if ongoing sx's tomorrow.  Discussed red flag symptoms necessitating urgent follow-up in ER tonight.   - XR Abdomen 2 Views; Future  - CBC with platelets differential  - Wet prep    2. Yeast infection of the vagina  - fluconazole (DIFLUCAN) 150 MG tablet; Take 1 tablet (150 mg) by mouth once for 1 dose May repeat in 72 hours if symptoms are not yet resolved  Dispense: 2 tablet; Refill: 0    Will send note to GYN as fyi    Patient Instructions   Use senna tonight. You can buy this over the counter in the constipation/laxative section of the drugstore. Take a couple tablets tonight. If you are still having pain tomorrow, give us a call and we will order a CT. If you spike a fever or your pain worsens, you will need to go to the emergency department.     Take a stool softener daily. You can take over the counter docusate. Take 100 mg daily.        Length of visit was 32 minutes with more than 50 percent of that time used for discussing medical concerns and education    The  information in this document, created by the medical scribe for me, accurately reflects the services I personally performed and the decisions made by me. I have reviewed and approved this document for accuracy.   Flaquita Ramsey MD  [facility]

## 2019-01-02 NOTE — TELEPHONE ENCOUNTER
"Patient transferred from [facility] requesting triage for patient. Reporting patient is 5 weeks post . Patient stating symptoms started yesterday with right lower quadrant pain. \"It felt like I was getting my period.\" Reporting right lower side constant pain that increases with movement or deep breath. Rating pain \"7\" on 0-10 pain scale. Temp 99.6 Oral this morning.Vaginal discharge yellow watery.   Per guidelines advised to be seen with in 24 hours. Caller verbalized understanding. Denies further questions.    Vangie Burch RN  [facility]       Reason for Disposition    [1] MILD-MODERATE post-op pain (e.g., pain scale 1-7) AND [2] not controlled with pain medications    Additional Information    Negative: Sounds like a life-threatening emergency to the triager    Negative: Chest pain    Negative: Difficulty breathing    Negative: Abdominal pain (not incision pain) is main symptom    Negative: Surgical incision symptoms and questions    Negative: Discomfort (pain, burning or stinging) when passing urine    Negative: Constipation    Negative: Calf pain    Negative: Leg swelling    Negative: Blurred vision or visual changes    Negative: Medication question    Negative: [1] Widespread rash AND [2] bright red, sunburn-like    Negative: Fever > 100.4 F (38.0 C)    Negative: [1] SEVERE pain AND [2] not relieved by pain medications    Negative: [1] SEVERE headache AND [2] not relieved with pain medications (e.g., acetaminophen/Tylenol)    Negative: [1] Vomiting AND [2] persists > 4 hours    Negative: [1] Vomiting AND [2] abdomen is getting more swollen    Negative: [1] Drinking very little AND [2] dehydration suspected (e.g., no urine > 12 hours, very dry mouth, very lightheaded)    Negative: Patient sounds very sick or weak to the triager    Negative: Foul smelling vaginal discharge (i.e., lochia)    Negative: Caller has URGENT question and triager unable to answer question    Negative: [1] Headache " AND [2] after spinal (epidural) anesthesia AND [3] not severe    Negative: [1] Daily fever > 99.5 F (37.5 C) AND [2] persists > 3 days    Protocols used: POSTPARTUM -  SYMPTOMS-ADULT-AH

## 2019-01-02 NOTE — PATIENT INSTRUCTIONS
Use senna tonight. You can buy this over the counter in the constipation/laxative section of the drugstore. Take a couple tablets tonight. If you are still having pain tomorrow, give us a call and we will order a CT. If you spike a fever, start vomiting or your pain worsens, you will need to go to the emergency department.     Take a stool softener daily. You can take over the counter docusate. Take 100 mg daily.

## 2019-01-03 ENCOUNTER — MYC MEDICAL ADVICE (OUTPATIENT)
Dept: FAMILY MEDICINE | Facility: CLINIC | Age: 34
End: 2019-01-03

## 2019-01-08 ENCOUNTER — PRENATAL OFFICE VISIT (OUTPATIENT)
Dept: OBGYN | Facility: CLINIC | Age: 34
End: 2019-01-08
Payer: COMMERCIAL

## 2019-01-08 VITALS
TEMPERATURE: 97.8 F | DIASTOLIC BLOOD PRESSURE: 67 MMHG | BODY MASS INDEX: 20.8 KG/M2 | SYSTOLIC BLOOD PRESSURE: 98 MMHG | OXYGEN SATURATION: 96 % | WEIGHT: 121.2 LBS | HEART RATE: 64 BPM

## 2019-01-08 DIAGNOSIS — Z30.011 ENCOUNTER FOR INITIAL PRESCRIPTION OF CONTRACEPTIVE PILLS: ICD-10-CM

## 2019-01-08 PROBLEM — Z23 NEED FOR TDAP VACCINATION: Status: RESOLVED | Noted: 2018-04-19 | Resolved: 2019-01-08

## 2019-01-08 PROCEDURE — 99207 ZZC POST PARTUM EXAM: CPT | Performed by: OBSTETRICS & GYNECOLOGY

## 2019-01-08 PROCEDURE — G0145 SCR C/V CYTO,THINLAYER,RESCR: HCPCS | Performed by: OBSTETRICS & GYNECOLOGY

## 2019-01-08 PROCEDURE — 87624 HPV HI-RISK TYP POOLED RSLT: CPT | Performed by: OBSTETRICS & GYNECOLOGY

## 2019-01-08 RX ORDER — ACETAMINOPHEN AND CODEINE PHOSPHATE 120; 12 MG/5ML; MG/5ML
0.35 SOLUTION ORAL DAILY
Qty: 90 TABLET | Refills: 3 | Status: SHIPPED | OUTPATIENT
Start: 2019-01-08 | End: 2020-02-28

## 2019-01-08 ASSESSMENT — PATIENT HEALTH QUESTIONNAIRE - PHQ9: SUM OF ALL RESPONSES TO PHQ QUESTIONS 1-9: 0

## 2019-01-08 NOTE — PATIENT INSTRUCTIONS
Please call if you any questions.    [facility]  [address] [place]   [address]  [phone]        Tatianna Ch

## 2019-01-08 NOTE — PROGRESS NOTES
Subjective  33 year old non-pregnant female presents today for a postpartum visit.  Patient had a primary c section on 11/27/18 for FTP.  No pain.  No vaginal bleeding.  No problems urinating.  Normal bowel movements with stool softeners.  Patient was seen last week and was found to have a yeast infection.  She is taking Diflucan.  Patient is breast feeding.  No signs and symptoms of ppd.  Patient scored a 0 on the PHQ-9.  No thoughts of suicide or infanticide.  Patient is due for a pap smear today.  Patient is wanting to do an oral contractive pills for birth control.   Will do Micronor because she is breast feeding.  She has hemorrhoids that we discussed treating with over the counter medications at this time.  Patient has been going to Physical Therapy to help with hip pain post partum which she feels is working well.        ROS: 10 point ROS neg other than the symptoms noted above in the HPI.  Past Medical History:   Diagnosis Date     GERD (gastroesophageal reflux disease)      Hypothyroid      Intradermal nevus 03/27/2007    right arm-irritated intradermal nevus     Past Surgical History:   Procedure Laterality Date     NO HISTORY OF SURGERY       SALPINGECTOMY Left 11/28/2017    Ectopic pregnancy     Family History   Problem Relation Age of Onset     Neurologic Disorder Mother         MSAND LUPUS     Thyroid Disease Mother      Depression Father      Thyroid Disease Father      Depression Brother      Thyroid Disease Maternal Grandmother      Cancer Maternal Grandmother      Thyroid Disease Maternal Grandfather      Thyroid Disease Paternal Grandmother      Thyroid Disease Paternal Grandfather      Diabetes No family hx of      Hypertension No family hx of      Cerebrovascular Disease No family hx of      Glaucoma No family hx of      Macular Degeneration No family hx of      Social History     Tobacco Use     Smoking status: Former Smoker     Last attempt to quit: 8/1/2008     Years since quitting: 10.4      Smokeless tobacco: Never Used     Tobacco comment: Lives in smoke free household   Substance Use Topics     Alcohol use: No         Objective  Vitals: BP 98/67   Pulse 64   Temp 97.8  F (36.6  C) (Oral)   Wt 55 kg (121 lb 3.2 oz)   SpO2 96%   Breastfeeding? Yes   BMI 20.80 kg/m    BMI= Body mass index is 20.8 kg/m .         Abd=soft, Nontender/nondistended, incision=healed well  PELVIC:    External genitalia: normal without lesion  Vagina: normal mucosa and rugae, no discharge, no odor   Cervix: normal without lesion, healthy, pap smear obtained  Uterus: small, mobile, nontender.  Adnexa: non tender, without masses  Rectal: deffered, small hemorrhoids noted  Ext=no clubbing or cyanosis      Assessment  1.)  Post partum visit  2.)  S/p primary c section on 11/27/18 due to FTP  3.)  Birth control   4.)  Due for pap smear  5.)  Hemorrhoids       Plan  1.)  Oral contractive pills ordered  2.)  Pap smear  3.)  Over the counter hemorrhoidal cream      Tatianna Ch

## 2019-01-11 LAB
COPATH REPORT: NORMAL
PAP: NORMAL

## 2019-01-15 LAB
FINAL DIAGNOSIS: NORMAL
HPV HR 12 DNA CVX QL NAA+PROBE: NEGATIVE
HPV16 DNA SPEC QL NAA+PROBE: NEGATIVE
HPV18 DNA SPEC QL NAA+PROBE: NEGATIVE
SPECIMEN DESCRIPTION: NORMAL
SPECIMEN SOURCE CVX/VAG CYTO: NORMAL

## 2019-01-21 ENCOUNTER — OFFICE VISIT (OUTPATIENT)
Dept: FAMILY MEDICINE | Facility: CLINIC | Age: 34
End: 2019-01-21
Payer: COMMERCIAL

## 2019-01-21 VITALS
TEMPERATURE: 98.2 F | HEIGHT: 64 IN | WEIGHT: 121 LBS | BODY MASS INDEX: 20.66 KG/M2 | OXYGEN SATURATION: 98 % | RESPIRATION RATE: 16 BRPM | SYSTOLIC BLOOD PRESSURE: 96 MMHG | HEART RATE: 66 BPM | DIASTOLIC BLOOD PRESSURE: 62 MMHG

## 2019-01-21 DIAGNOSIS — N89.8 VAGINAL DISCHARGE: Primary | ICD-10-CM

## 2019-01-21 LAB
SPECIMEN SOURCE: NORMAL
WET PREP SPEC: NORMAL

## 2019-01-21 PROCEDURE — 87210 SMEAR WET MOUNT SALINE/INK: CPT | Performed by: FAMILY MEDICINE

## 2019-01-21 PROCEDURE — 99213 OFFICE O/P EST LOW 20 MIN: CPT | Performed by: FAMILY MEDICINE

## 2019-01-21 ASSESSMENT — MIFFLIN-ST. JEOR: SCORE: 1248.85

## 2019-01-21 NOTE — PROGRESS NOTES
SUBJECTIVE:   Lalitha Dey is a 33 year old female who presents to clinic today for the following health issues:    Yeast infection per Dr. Ramsey    Description:  Vaginal Discharge: Patient is still having yellow discharge    Itching (Pruritis): no   Burning sensation:  no   Odor: no     Accompanying Signs & Symptoms:  Pain with Urination: no   Abdominal Pain: no   Fever: no     History:   Sexually active: not since given birth    Precipitating factors:   Recent Antibiotic Use: YES- finished antibiotics    The discharge does not have an odor to it that she can tell. She was given two doses of diflucan, but the discharge is still present following this treatment. She had a baby at the end of November via C/S. She does have some abnormal urination, but isn't sure if this is normal post-pregnancy or not. Her right lower quadrant pain has resolved, and she believes this was from constipation in pregnancy.   She was on antibiotics going into labor because she was GBS positive.     Therapies Tried and outcome: improved but not resolved       Problem list and histories reviewed & adjusted, as indicated.  Additional history: as documented    Patient Active Problem List   Diagnosis     CARDIOVASCULAR SCREENING; LDL GOAL LESS THAN 160     Seasonal allergic rhinitis     Acne     Conjunctivitis, allergic     Dry eyes     Blepharitis     Hypothyroidism     S/P ectopic pregnancy     Gastroesophageal reflux disease without esophagitis     S/P  section     Sacroiliac joint pain     Encounter for initial prescription of contraceptive pills     Past Surgical History:   Procedure Laterality Date     NO HISTORY OF SURGERY       SALPINGECTOMY Left 2017    Ectopic pregnancy       Social History     Tobacco Use     Smoking status: Former Smoker     Last attempt to quit: 2008     Years since quitting: 10.4     Smokeless tobacco: Never Used     Tobacco comment: Lives in smoke free household   Substance Use  Topics     Alcohol use: No     Family History   Problem Relation Age of Onset     Neurologic Disorder Mother         MSAND LUPUS     Thyroid Disease Mother      Depression Father      Thyroid Disease Father      Depression Brother      Thyroid Disease Maternal Grandmother      Cancer Maternal Grandmother      Thyroid Disease Maternal Grandfather      Thyroid Disease Paternal Grandmother      Thyroid Disease Paternal Grandfather      Diabetes No family hx of      Hypertension No family hx of      Cerebrovascular Disease No family hx of      Glaucoma No family hx of      Macular Degeneration No family hx of          Current Outpatient Medications   Medication Sig Dispense Refill     levothyroxine (SYNTHROID/LEVOTHROID) 50 MCG tablet TAKE 1 TABLET BY MOUTH EVERY DAY 90 tablet 3     norethindrone (MICRONOR) 0.35 MG tablet Take 1 tablet (0.35 mg) by mouth daily 90 tablet 3     Prenatal Vit-Fe Fumarate-FA (PRENATAL MULTIVITAMIN PLUS IRON) 27-0.8 MG TABS per tablet Take 1 tablet by mouth daily       Allergies   Allergen Reactions     Nkda [No Known Drug Allergies]      Seasonal Allergies      BP Readings from Last 3 Encounters:   01/21/19 96/62   01/08/19 98/67   01/02/19 92/60    Wt Readings from Last 3 Encounters:   01/21/19 54.9 kg (121 lb)   01/08/19 55 kg (121 lb 3.2 oz)   01/02/19 54 kg (119 lb)         Reviewed and updated as needed this visit by clinical staff  Tobacco  Allergies  Meds  Med Hx  Surg Hx  Fam Hx  Soc Hx      Reviewed and updated as needed this visit by Provider  Tobacco  Allergies  Meds  Med Hx  Surg Hx  Fam Hx  Soc Hx        ROS:  Constitutional, HEENT, cardiovascular, pulmonary, GI, , musculoskeletal, neuro, skin, endocrine and psych systems are negative, except as otherwise noted.    This document serves as a record of the services and decisions personally performed and made by Chapis Goodrich MD. It was created on her behalf by Love Mtz, a trained medical scribe. The creation of  "this document is based the provider's statements to the medical scribe.  Love Mtz January 21, 2019 6:01 PM      OBJECTIVE:     BP 96/62 (BP Location: Right arm, Patient Position: Sitting, Cuff Size: Adult Regular)   Pulse 66   Temp 98.2  F (36.8  C) (Oral)   Resp 16   Ht 1.626 m (5' 4\")   Wt 54.9 kg (121 lb)   SpO2 98%   BMI 20.77 kg/m    Body mass index is 20.77 kg/m .  GENERAL: healthy, alert and no distress  EYES: Eyes grossly normal to inspection, PERRL and conjunctivae and sclerae normal  RESP: lungs clear to auscultation - no rales, rhonchi or wheezes  CV: regular rate and rhythm, normal S1 S2, no S3 or S4, no murmur, click or rub, no peripheral edema and peripheral pulses strong  ABDOMEN: soft, nontender, no hepatosplenomegaly, no masses    (female): normal female external genitalia, normal urethral meatus, vaginal mucosa pink, moist, well rugated, and normal cervix/adnexa/uterus without masses.  Thin clear discharge, ho odor detected  MS: no gross musculoskeletal defects noted, no edema  SKIN: no suspicious lesions or rashes  NEURO: Normal strength and tone, mentation intact and speech normal  PSYCH: mentation appears normal, affect normal/bright    Diagnostic Test Results:  Results for orders placed or performed in visit on 01/21/19 (from the past 24 hour(s))   Wet prep   Result Value Ref Range    Specimen Description Vagina     Wet Prep No Trichomonas seen     Wet Prep No clue cells seen     Wet Prep No yeast seen          ASSESSMENT/PLAN:     1. Vaginal discharge  Her wet prep is clear today, so this is likely still just hormonal fluctuations post-pregnancy. Anticipate that it will normalize over the next several weeks.   - Wet prep    Patient Instructions   No infection noted on the vaginal testing.  The discharge you are seeing is likely related to the change of hormones.  This will fluctuate over the next 4-6 weeks.        The information in this document, created by the medical scribe for " me, accurately reflects the services I personally performed and the decisions made by me. I have reviewed and approved this document for accuracy.     Chapis Goodrich MD  [facility]

## 2019-01-22 NOTE — RESULT ENCOUNTER NOTE
Vaginal testing negative for infection as discussed in office.  Please call or MyChart message me if you have any questions.   PSK

## 2019-01-22 NOTE — PATIENT INSTRUCTIONS
No infection noted on the vaginal testing.  The discharge you are seeing is likely related to the change of hormones.  This will fluctuate over the next 4-6 weeks.

## 2019-02-03 NOTE — MR AVS SNAPSHOT
After Visit Summary   9/7/2018    Lalitha Cortez    MRN: [id number]           Patient Information     Date Of Birth          1985        Visit Information        Provider Department      9/7/2018 9:00 AM Tatianna Ch DO [facility]        Today's Diagnoses     Normal pregnancy in third trimester    -  1    Hypothyroidism, unspecified type          Care Instructions    What to watch out for are: regular contractions every 5 min, vaginal bleeding, decreased fetal movement, or leakage of fluid.  Please call the office or go to L&D if you develop any of these signs and symptoms.      I will see you for your follow up appointment.  Please feel free to call if you have any questions or concerns.      Thanks,  Tatianna Ch, DO          Follow-ups after your visit        Follow-up notes from your care team     Return in about 2 weeks (around 9/21/2018).      Your next 10 appointments already scheduled     Sep 25, 2018  8:00 AM CDT   ESTABLISHED PRENATAL with Tatianna Ch DO   [facility] ([facility]    [address] [facility] [address]   [phone]            Oct 09, 2018  8:00 AM CDT   ESTABLISHED PRENATAL with Tatianna Ch DO   [facility] ([facility])    [address] [facility] [address]   [phone]            Oct 23, 2018  1:00 PM CDT   ESTABLISHED PRENATAL with Tatianna Ch DO   [facility] ([facility])    [address] [facility] [address]   [phone]            Nov 06, 2018  8:00 AM CST   ESTABLISHED PRENATAL with Tatianna Ch DO   [facility] ([facility])    [address] [facility] [address]   [phone]            Nov 13, 2018  8:30 AM CST   ESTABLISHED PRENATAL with Tatianna Ch DO   [facility] ([facility])    [address] [place] [address] [phone]            Nov 20, 2018  8:30 AM CST   ESTABLISHED PRENATAL with Tatianna Ch, DO   [facility] ([facility]    [address] [place] [address] [phone]              Who to contact     If you have questions or need follow up information about today's clinic visit or your schedule please contact [facility] directly at [phone].  Normal or non-critical lab and imaging results will be communicated to you by [employer], letter or phone within 4 business days after the clinic has received the results. If you do not hear from us within 7 days, please contact the clinic through [employer] or phone. If you have a critical or abnormal lab result, we will notify you by phone as soon as possible.  Submit refill requests through [employer] or call your pharmacy and they will forward the refill request to us. Please allow 3 business days for your refill to be completed.          Additional Information About Your Visit        [employer] Information     [employer] gives you secure access to your electronic health record. If you see a primary care provider, you can also send messages to your care team and make appointments. If you have questions, please call your primary care clinic.  If you do not have a primary care provider, please call [phone] and they will assist you.        Care EveryWhere ID     This is your Care EveryWhere ID. This could be used by other organizations to access your [facility] medical records  [id number]        Your Vitals Were     Pulse Last Period BMI (Body Mass Index)             76 02/09/2018 21.97 kg/m2          Blood Pressure from Last 3 Encounters:   09/07/18 96/62   08/28/18 94/63   07/31/18 91/51    Weight from Last 3 Encounters:   09/07/18 127 lb 14.4 oz (58 kg)   08/28/18 127 lb 12.8 oz (58 kg)   07/31/18 124 lb 8 oz (56.5 kg)              Today, you  had the following     No orders found for display       Primary Care Provider Office Phone # Fax #    Kristen M Kehr, PA-C [phone] [address] [facility] [address]        Equal Access to Services     MARILYN HOUSER : Hadii aad ku hadralfo Soomaali, waaxda luqadaha, qaybta kaalmada adeegyada, rigoberto adánin hayaan bessymike mora oswald . So [facility] [phone].    ATENCIÓN: Si habla español, tiene a heredia disposición servicios gratuitos de asistencia lingüística. Llame al [phone].    We comply with applicable federal civil rights laws and [place] laws. We do not discriminate on the basis of race, color, national origin, age, disability, sex, sexual orientation, or gender identity.            Thank you!     Thank you for choosing [facility]  for your care. Our goal is always to provide you with excellent care. Hearing back from our patients is one way we can continue to improve our services. Please take a few minutes to complete the written survey that you may receive in the mail after your visit with us. Thank you!             Your Updated Medication List - Protect others around you: Learn how to safely use, store and throw away your medicines at www.disposemymeds.org.          This list is accurate as of 9/7/18  9:19 AM.  Always use your most recent med list.                   Brand Name Dispense Instructions for use Diagnosis    DHA PO           levothyroxine 50 MCG tablet    SYNTHROID/LEVOTHROID    90 tablet    TAKE 1 TABLET BY MOUTH EVERY DAY    Hypothyroidism, unspecified type       prenatal multivitamin plus iron 27-0.8 MG Tabs per tablet      Take 1 tablet by mouth daily           cont. ARVs

## 2019-03-06 NOTE — TELEPHONE ENCOUNTER
LM for the patient to return call to the clinic to discuss the below. Will await to hear from patient. Erin Leigh RN, BSN    Patient is having issues/concerns and told by Madhuri to call if this happens

## 2019-03-15 ENCOUNTER — MYC MEDICAL ADVICE (OUTPATIENT)
Dept: OBGYN | Facility: CLINIC | Age: 34
End: 2019-03-15

## 2019-04-15 ENCOUNTER — MYC MEDICAL ADVICE (OUTPATIENT)
Dept: OBGYN | Facility: CLINIC | Age: 34
End: 2019-04-15

## 2019-04-16 DIAGNOSIS — E03.9 ACQUIRED HYPOTHYROIDISM: Primary | ICD-10-CM

## 2019-04-16 NOTE — TELEPHONE ENCOUNTER
I would reassure patient that this sounds very normal post partum especially 6-9 months postpartum.  I did order a thyroid lab test so she can have that drawn if she wants. I will let her know the results when I get them.  Thanks.    Tatianna Ch

## 2019-06-14 PROBLEM — M53.3 SACROILIAC JOINT PAIN: Status: RESOLVED | Noted: 2018-12-20 | Resolved: 2019-06-14

## 2019-06-19 ENCOUNTER — NURSE TRIAGE (OUTPATIENT)
Dept: NURSING | Facility: CLINIC | Age: 34
End: 2019-06-19

## 2019-06-19 NOTE — TELEPHONE ENCOUNTER
Lalitha is having chest pain today.  Pain has been present for one week on left side.  Pain is constant.  Denies difficulty breathing.  Denies injury.      Additional Information    Negative: Severe difficulty breathing (e.g., struggling for each breath, speaks in single words)    Negative: Passed out (i.e., fainted, collapsed and was not responding)    Negative: Chest pain lasting longer than 5 minutes and ANY of the following:* Over 50 years old* Over 30 years old and at least one cardiac risk factor (i.e., high blood pressure, diabetes, high cholesterol, obesity, smoker or strong family history of heart disease)* Pain is crushing, pressure-like, or heavy * Took nitroglycerin and chest pain was not relieved* History of heart disease (i.e., angina, heart attack, bypass surgery, angioplasty, CHF)    Negative: Visible sweat on face or sweat dripping down face    Negative: Sounds like a life-threatening emergency to the triager    Negative: SEVERE chest pain    Negative: Pain also present in shoulder(s) or arm(s) or jaw    Negative: Difficulty breathing    Negative: Cocaine use within last 3 days    Negative: History of prior 'blood clot' in leg or lungs (i.e., deep vein thrombosis, pulmonary embolism)    Negative: Recent illness requiring prolonged bed rest (i.e., immobilization)    Negative: Hip or leg fracture in past 2 months (e.g, or had cast on leg or ankle)    Negative: Major surgery in the past month    Negative: Recent long-distance travel with prolonged time in car, bus, plane, or train (i.e., within past 2 weeks; 6 or more hours duration)    Negative: Heart beating irregularly or very rapidly    Chest pain lasting longer than 5 minutes    Protocols used: CHEST PAIN-A-OH

## 2019-06-21 ENCOUNTER — OFFICE VISIT (OUTPATIENT)
Dept: PEDIATRICS | Facility: CLINIC | Age: 34
End: 2019-06-21
Payer: COMMERCIAL

## 2019-06-21 VITALS
BODY MASS INDEX: 18.2 KG/M2 | DIASTOLIC BLOOD PRESSURE: 68 MMHG | WEIGHT: 106.6 LBS | HEIGHT: 64 IN | HEART RATE: 69 BPM | TEMPERATURE: 99.1 F | SYSTOLIC BLOOD PRESSURE: 104 MMHG | OXYGEN SATURATION: 98 %

## 2019-06-21 DIAGNOSIS — M79.622 PAIN OF LEFT UPPER ARM: ICD-10-CM

## 2019-06-21 DIAGNOSIS — R07.89 CHEST WALL PAIN: Primary | ICD-10-CM

## 2019-06-21 PROCEDURE — 99214 OFFICE O/P EST MOD 30 MIN: CPT | Performed by: INTERNAL MEDICINE

## 2019-06-21 RX ORDER — CEPHALEXIN 500 MG/1
500 CAPSULE ORAL
COMMUNITY
Start: 2019-06-19 | End: 2020-02-11

## 2019-06-21 ASSESSMENT — MIFFLIN-ST. JEOR: SCORE: 1178.53

## 2019-06-21 NOTE — PROGRESS NOTES
Emergency room follow up    Lalitha Dey is a 34 year old female who presents to clinic today for the following health issues:    HPI   ED/UC Followup:    Facility:  [facility]  Date of visit: 19  Reason for visit: Chest pain, mastitis  Current Status: Started antibiotic right away and feels like the mastitis is getting worse, she can still feel lumps on the left side of her chest/shoulder area.  Has a numbing sensation going down her left arm to her left hand (4th and 5th digits),  Constant pain.  Using heating pads     34-year-old 7-month postpartum lady who is still breast-feeding and currently on birth control presented for follow-up following recent emergency room visit for chest pain.  About a week and half ago she started experiencing pain in the mid chest which then would radiate up to the left shoulder region.  She had no associated shortness of breath.  No nausea vomiting or heartburn.  She went to the emergency room and EKG, chest x-ray, troponin I, d-dimer, CBC and and BMP were found to be within normal range.  The physician at that time felt that she may have early mastitis of the left breast.  The patient was not necessary experiencing breast pain but more so pain in the left upper chest region.  No history of trauma.  In the past few days she also has noticed some numbness of the left hand ring finger and little finger with some numbness pain or discomfort left arm left side of the neck.  She denies neck pain.      Patient Active Problem List   Diagnosis     CARDIOVASCULAR SCREENING; LDL GOAL LESS THAN 160     Seasonal allergic rhinitis     Acne     Conjunctivitis, allergic     Dry eyes     Blepharitis     Hypothyroidism     S/P ectopic pregnancy     Gastroesophageal reflux disease without esophagitis     S/P  section     Encounter for initial prescription of contraceptive pills     Past Surgical History:   Procedure Laterality Date     NO HISTORY OF  SURGERY       SALPINGECTOMY Left 11/28/2017    Ectopic pregnancy       Social History     Tobacco Use     Smoking status: Former Smoker     Last attempt to quit: 8/1/2008     Years since quitting: 10.8     Smokeless tobacco: Never Used     Tobacco comment: Lives in smoke free household   Substance Use Topics     Alcohol use: No     Family History   Problem Relation Age of Onset     Neurologic Disorder Mother         MSAND LUPUS     Thyroid Disease Mother      Depression Father      Thyroid Disease Father      Depression Brother      Thyroid Disease Maternal Grandmother      Cancer Maternal Grandmother      Thyroid Disease Maternal Grandfather      Thyroid Disease Paternal Grandmother      Thyroid Disease Paternal Grandfather      Diabetes No family hx of      Hypertension No family hx of      Cerebrovascular Disease No family hx of      Glaucoma No family hx of      Macular Degeneration No family hx of          Current Outpatient Medications   Medication Sig Dispense Refill     cephALEXin (KEFLEX) 500 MG capsule Take 500 mg by mouth       levothyroxine (SYNTHROID/LEVOTHROID) 50 MCG tablet TAKE 1 TABLET BY MOUTH EVERY DAY 90 tablet 3     norethindrone (MICRONOR) 0.35 MG tablet Take 1 tablet (0.35 mg) by mouth daily 90 tablet 3     Prenatal Vit-Fe Fumarate-FA (PRENATAL MULTIVITAMIN PLUS IRON) 27-0.8 MG TABS per tablet Take 1 tablet by mouth daily       Allergies   Allergen Reactions     Nkda [No Known Drug Allergies]      Seasonal Allergies      Recent Labs   Lab Test 10/09/18  0828 07/31/18  1613  07/27/11  0758   LDL  --   --   --  56   HDL  --   --   --  73   TRIG  --   --   --  103   TSH 2.15 1.75   < > 2.27    < > = values in this interval not displayed.          Reviewed and updated as needed this visit by Provider         Review of Systems   ROS COMP: Constitutional, HEENT, cardiovascular, pulmonary, gi and gu systems are negative, except as otherwise noted.      Objective    There were no vitals taken for this  visit.  There is no height or weight on file to calculate BMI.  Physical Exam   GENERAL: healthy, alert and no distress  HENT: ear canals and TM's normal, nose and mouth without ulcers or lesions  NECK: no adenopathy, no asymmetry, masses, or scars and thyroid normal to palpation  RESP: lungs clear to auscultation - no rales, rhonchi or wheezes  CV: regular rate and rhythm, normal S1 S2, no S3 or S4, no murmur, click or rub, no peripheral edema and peripheral pulses strong  MS: Range of motion of the neck is normal.  Range of motion of left shoulder elbow and wrist is normal.  There is tenderness of the left side chest wall particularly the pectoral muscle close to the shoulder.  The anterior axillary muscle is tender to touch.  No lymphadenopathy in the axilla.  Some tenderness over the left costochondral junctions.  Breast exam: The right breast examination performed both in supine and sitting position.  No discrete lump felt in either breast.  No tenderness areas identified.  No expressible discharge from the nipples.  The breast exam was essentially normal.  Neurological exam: Deep tendon reflexes upper extremity normal bilaterally symmetrical.  Pain and touch perception intact.  No neurological deficit identified.    Diagnostic Test Results:  Labs reviewed in Epic        Assessment & Plan     1.  Left upper chest wall pain.  Patient informed that I did not see any evidence of mastitis noted I feel that the pain was related to breast.  She was prescribed Keflex in the ER for possible mastitis and I told her to discontinue it.  She has been massaging that area of the chest and I told her to stop doing that as well.  She will take Tylenol as needed and I expect this symptom to self resolved.    2.  Left arm pain suggestive of ulnar nerve distribution.  There is no neurological deficit.  Patient reassured.  It is not related to her chest wall pain.             No follow-ups on file.    Leonardo See MD  [facility]  [facility]

## 2019-08-02 ENCOUNTER — OFFICE VISIT (OUTPATIENT)
Dept: OBGYN | Facility: CLINIC | Age: 34
End: 2019-08-02
Payer: COMMERCIAL

## 2019-08-02 VITALS
SYSTOLIC BLOOD PRESSURE: 100 MMHG | BODY MASS INDEX: 17.42 KG/M2 | WEIGHT: 101.5 LBS | DIASTOLIC BLOOD PRESSURE: 74 MMHG | HEART RATE: 72 BPM

## 2019-08-02 DIAGNOSIS — E03.9 HYPOTHYROIDISM, UNSPECIFIED TYPE: Primary | ICD-10-CM

## 2019-08-02 DIAGNOSIS — N89.8 VAGINAL DISCHARGE: ICD-10-CM

## 2019-08-02 DIAGNOSIS — E03.9 HYPOTHYROIDISM, UNSPECIFIED TYPE: ICD-10-CM

## 2019-08-02 DIAGNOSIS — N63.0 LUMP OR MASS IN BREAST: Primary | ICD-10-CM

## 2019-08-02 DIAGNOSIS — R63.4 WEIGHT LOSS: ICD-10-CM

## 2019-08-02 LAB
SPECIMEN SOURCE: NORMAL
TSH SERPL DL<=0.005 MIU/L-ACNC: 5.43 MU/L (ref 0.4–4)
WET PREP SPEC: NORMAL

## 2019-08-02 PROCEDURE — 84443 ASSAY THYROID STIM HORMONE: CPT | Performed by: OBSTETRICS & GYNECOLOGY

## 2019-08-02 PROCEDURE — 36415 COLL VENOUS BLD VENIPUNCTURE: CPT | Performed by: OBSTETRICS & GYNECOLOGY

## 2019-08-02 PROCEDURE — 99214 OFFICE O/P EST MOD 30 MIN: CPT | Performed by: OBSTETRICS & GYNECOLOGY

## 2019-08-02 PROCEDURE — 87210 SMEAR WET MOUNT SALINE/INK: CPT | Performed by: OBSTETRICS & GYNECOLOGY

## 2019-08-02 RX ORDER — LEVOTHYROXINE SODIUM 75 UG/1
75 TABLET ORAL DAILY
Qty: 90 TABLET | Refills: 1 | Status: SHIPPED | OUTPATIENT
Start: 2019-08-02 | End: 2020-01-29

## 2019-08-02 NOTE — PROGRESS NOTES
Subjective  34 year old non-pregnant female presents today with many concerns.  Patient was seen in the ED for chest pain in June. Patient had called the office complaining of chest pain and was told to go to the ED.  Patient was dx with mastitis in the ED.  She followed up with an FP after the ED and was told it wasn't mastitis and to stop the Keflex so she did.  He told her it was chest wall pain.  Patient still feels a lump in her left breast.  Patient had a CXR done in the ED and it was negative.  Patient stopped her birth control in June because she was told it might be a blood clot before she was seen in the ED.  Her EKG was normal as well in the ED.  She denies any redness on the breast.  It is sore however.  She is still breastfeeding.  The pain radiates up and around her breast.  No fever or chills.  No abnormal discharge.  She complains of a possible yeast infection as well.  Some vaginal discharge.  No odor.  Patient is not really sexually active now.  She will use condoms when she is.   Patient also complains of weight loss however she is not trying.  She has lost 20 lbs since Jan.  She has been eating more sweets then usual.  She has been eating fast food lately too so is very concerned about this weight loss.  I recommended patient follow up with Endocrinology.  She also complains of significant hair loss.  This is likely due to normal postpartum hormones.        ROS: 10 point ROS neg other than the symptoms noted above in the HPI.  Past Medical History:   Diagnosis Date     GERD (gastroesophageal reflux disease)      Hypothyroid      Intradermal nevus 03/27/2007    right arm-irritated intradermal nevus     Past Surgical History:   Procedure Laterality Date     NO HISTORY OF SURGERY       SALPINGECTOMY Left 11/28/2017    Ectopic pregnancy     Family History   Problem Relation Age of Onset     Neurologic Disorder Mother         MSAND LUPUS     Thyroid Disease Mother      Depression Father      Thyroid  Disease Father      Depression Brother      Thyroid Disease Maternal Grandmother      Cancer Maternal Grandmother      Thyroid Disease Maternal Grandfather      Thyroid Disease Paternal Grandmother      Thyroid Disease Paternal Grandfather      Diabetes No family hx of      Hypertension No family hx of      Cerebrovascular Disease No family hx of      Glaucoma No family hx of      Macular Degeneration No family hx of      Social History     Tobacco Use     Smoking status: Former Smoker     Last attempt to quit: 2008     Years since quittin.0     Smokeless tobacco: Never Used     Tobacco comment: Lives in smoke free household   Substance Use Topics     Alcohol use: No         Objective  Vitals: /74   Pulse 72   Wt 46 kg (101 lb 8 oz)   BMI 17.42 kg/m    BMI= Body mass index is 17.42 kg/m .        General appearance=well developed, well-nourished female  Psych=mood is stable, alert and oriented x3  Skin=no rashes or lesions seen  Breast:  Benign exam, no masses palpated however tender to palpation from the 11-1:00 position, No skin changes, no axillary lymphadenopathy, no nipple discharge.  Axilla feel completely normal, no lymph node enlargement  Neck=overall appearance is normal  Heart=RRR, no murmurs, no swelling noted  Thyroid=normal, no masses, no TTP, no enlargement  Lungs=non-labored breathing, no use of accessory muscles, clear to ausculation bilaterally  Abd=soft, Nontender/nondistended, no masses, no signs of hernias, no evidence of hepatosplenomegaly  PELVIC:    External genitalia: normal without lesions or masses  Urethral meatus: no lesions or prolapse noted, normal size  Urethra: no masses, non tender  Bladder: non tender, no fullness  Vagina: normal mucosa and rugae, moderate amount of white discharge, wet prep obtained  Cervix: normal without lesion, no cervical motion tenderness, healthy, multiparous  Uterus: small, mobile, nontender.  Adnexa: non tender, without masses  Rectal:  deferred  Ext=no clubbing or cyanosis, no swelling      Assessment  1.)  Breast pain  2.)  Vaginal discharge  3.)  Weight loss  4.)  Hair loss  5.)  Hypothyroidism      Plan  1.)  Breast ultrasound  2.)  Wet prep  3.)  Follow-up with Endocrinology  4.)  TSH      35 minutes was spent face to face with the patient today discussing her history, diagnosis, and follow-up plan as noted above.  Over 50% of the visit was spent in counseling and coordination of care.      Nursing notes read and reviewed    Tatianna Ch

## 2019-08-02 NOTE — PATIENT INSTRUCTIONS
Please call if you any questions.    [place]  [address] [facility]  [place]   [id number]  [phone]        Tatianna Ch

## 2019-08-05 ENCOUNTER — ANCILLARY PROCEDURE (OUTPATIENT)
Dept: ULTRASOUND IMAGING | Facility: CLINIC | Age: 34
End: 2019-08-05
Attending: OBSTETRICS & GYNECOLOGY
Payer: COMMERCIAL

## 2019-08-05 ENCOUNTER — ANCILLARY PROCEDURE (OUTPATIENT)
Dept: MAMMOGRAPHY | Facility: CLINIC | Age: 34
End: 2019-08-05
Attending: OBSTETRICS & GYNECOLOGY
Payer: COMMERCIAL

## 2019-08-05 DIAGNOSIS — N63.0 LUMP OR MASS IN BREAST: ICD-10-CM

## 2019-08-05 PROCEDURE — 76642 ULTRASOUND BREAST LIMITED: CPT | Mod: LT | Performed by: RADIOLOGY

## 2019-08-05 PROCEDURE — G0279 TOMOSYNTHESIS, MAMMO: HCPCS | Performed by: RADIOLOGY

## 2019-08-05 PROCEDURE — 77066 DX MAMMO INCL CAD BI: CPT | Performed by: RADIOLOGY

## 2019-08-27 ENCOUNTER — PRE VISIT (OUTPATIENT)
Dept: ENDOCRINOLOGY | Facility: CLINIC | Age: 34
End: 2019-08-27

## 2019-08-27 NOTE — TELEPHONE ENCOUNTER
Left message for pt to call back to clinic to go over Pre-Visit questions for upcoming appointment on 9/9/19 with Dr Carlson.   Vangie Guevara, CMA

## 2019-08-27 NOTE — TELEPHONE ENCOUNTER
Pt returned voicemail. Writer advised via katerina, care team unavailable. Pt will await clinic's call back.

## 2019-08-27 NOTE — TELEPHONE ENCOUNTER
PREVISIT INFORMATION                                                    Lalitha Dey scheduled for future visit at [facility] specialty clinics.    Patient is scheduled to see Dr Carlson on 9/9/19  Reason for visit: Weight loss  Referring provider Dr Ch  Has patient seen previous specialist? No  Medical Records:  Available in chart.  Patient was previously seen at a [facility] or [facility] facility.    REVIEW                                                      New patient packet mailed to patient: No  Medication reconciliation complete: Yes      Current Outpatient Medications   Medication Sig Dispense Refill     cephALEXin (KEFLEX) 500 MG capsule Take 500 mg by mouth       levothyroxine (SYNTHROID/LEVOTHROID) 50 MCG tablet TAKE 1 TABLET BY MOUTH EVERY DAY 90 tablet 3     levothyroxine (SYNTHROID/LEVOTHROID) 75 MCG tablet Take 1 tablet (75 mcg) by mouth daily 90 tablet 1     norethindrone (MICRONOR) 0.35 MG tablet Take 1 tablet (0.35 mg) by mouth daily (Patient not taking: Reported on 8/2/2019) 90 tablet 3     Prenatal Vit-Fe Fumarate-FA (PRENATAL MULTIVITAMIN PLUS IRON) 27-0.8 MG TABS per tablet Take 1 tablet by mouth daily         Allergies: Nkda [no known drug allergies] and Seasonal allergies        PLAN/FOLLOW-UP NEEDED                                                      Previsit review complete.  Patient will see provider at future scheduled appointment.     Patient Reminders Given:  Please, make sure you bring an updated list of your medications.   If you are having a procedure, please, present 15 minutes early.  If you need to cancel or reschedule,please call [phone].    Vangie Guevara, [facility]

## 2019-09-09 ENCOUNTER — OFFICE VISIT (OUTPATIENT)
Dept: ENDOCRINOLOGY | Facility: CLINIC | Age: 34
End: 2019-09-09
Payer: COMMERCIAL

## 2019-09-09 VITALS
BODY MASS INDEX: 17.85 KG/M2 | OXYGEN SATURATION: 98 % | SYSTOLIC BLOOD PRESSURE: 112 MMHG | HEART RATE: 69 BPM | DIASTOLIC BLOOD PRESSURE: 72 MMHG | WEIGHT: 104 LBS

## 2019-09-09 DIAGNOSIS — R63.4 WEIGHT LOSS: Primary | ICD-10-CM

## 2019-09-09 LAB
ALBUMIN SERPL-MCNC: 3.9 G/DL (ref 3.4–5)
ALP SERPL-CCNC: 70 U/L (ref 40–150)
ALT SERPL W P-5'-P-CCNC: 18 U/L (ref 0–50)
ANION GAP SERPL CALCULATED.3IONS-SCNC: 5 MMOL/L (ref 3–14)
AST SERPL W P-5'-P-CCNC: 14 U/L (ref 0–45)
BILIRUB SERPL-MCNC: 0.3 MG/DL (ref 0.2–1.3)
BUN SERPL-MCNC: 14 MG/DL (ref 7–30)
CALCIUM SERPL-MCNC: 8.6 MG/DL (ref 8.5–10.1)
CHLORIDE SERPL-SCNC: 108 MMOL/L (ref 94–109)
CO2 SERPL-SCNC: 31 MMOL/L (ref 20–32)
CREAT SERPL-MCNC: 0.78 MG/DL (ref 0.52–1.04)
ERYTHROCYTE [DISTWIDTH] IN BLOOD BY AUTOMATED COUNT: 11.9 % (ref 10–15)
GFR SERPL CREATININE-BSD FRML MDRD: >90 ML/MIN/{1.73_M2}
GLUCOSE SERPL-MCNC: 70 MG/DL (ref 70–99)
HCT VFR BLD AUTO: 42.3 % (ref 35–47)
HGB BLD-MCNC: 13.4 G/DL (ref 11.7–15.7)
MCH RBC QN AUTO: 27.1 PG (ref 26.5–33)
MCHC RBC AUTO-ENTMCNC: 31.7 G/DL (ref 31.5–36.5)
MCV RBC AUTO: 86 FL (ref 78–100)
PLATELET # BLD AUTO: 229 10E9/L (ref 150–450)
POTASSIUM SERPL-SCNC: 3.8 MMOL/L (ref 3.4–5.3)
PROT SERPL-MCNC: 7.6 G/DL (ref 6.8–8.8)
RBC # BLD AUTO: 4.94 10E12/L (ref 3.8–5.2)
SODIUM SERPL-SCNC: 144 MMOL/L (ref 133–144)
T4 FREE SERPL-MCNC: 1.13 NG/DL (ref 0.76–1.46)
TSH SERPL DL<=0.005 MIU/L-ACNC: 2.23 MU/L (ref 0.4–4)
WBC # BLD AUTO: 5.1 10E9/L (ref 4–11)

## 2019-09-09 PROCEDURE — 84439 ASSAY OF FREE THYROXINE: CPT | Performed by: INTERNAL MEDICINE

## 2019-09-09 PROCEDURE — 86376 MICROSOMAL ANTIBODY EACH: CPT | Performed by: INTERNAL MEDICINE

## 2019-09-09 PROCEDURE — 99214 OFFICE O/P EST MOD 30 MIN: CPT | Performed by: INTERNAL MEDICINE

## 2019-09-09 PROCEDURE — 80053 COMPREHEN METABOLIC PANEL: CPT | Performed by: INTERNAL MEDICINE

## 2019-09-09 PROCEDURE — 84443 ASSAY THYROID STIM HORMONE: CPT | Performed by: INTERNAL MEDICINE

## 2019-09-09 PROCEDURE — 85027 COMPLETE CBC AUTOMATED: CPT | Performed by: INTERNAL MEDICINE

## 2019-09-09 PROCEDURE — 36415 COLL VENOUS BLD VENIPUNCTURE: CPT | Performed by: INTERNAL MEDICINE

## 2019-09-09 NOTE — PROGRESS NOTES
Endocrinology Clinic Visit    Chief Complaint: Weight Loss (Patient is currently nursing)     Information obtained from:Patient    Subjective:         HPI: Lalitha eDy is a 34 year old female with history of thyroid ds who is seen in consultation at No ref. provider found's request for weight loss.     64-year-old female patient who is here today for concern regarding weight loss.  Patient delivered her first baby in November 2018.  Prior to that her baseline weight is around 114 pounds.  She noted recently during her emergency room visit that her weight was down to 101 pounds which alarmed her.  She was seen in the emergency room with the working diagnosis of chest wall pain and mastitis.  She was followed up in clinic the doctor and she was told to have possible breast mass which led to breast imaging studies,mammography and ultrasound of the breast.  Both mammography and ultrasound findings were within the normal limits.  ENDO VITALS-[facility] 9/9/2019 8/2/2019 6/21/2019 1/21/2019   Weight 104 lb 101 lb 8 oz 106 lb 9.6 oz 121 lb     [facility] 1/8/2019   Weight 121 lb 3.2 oz   After delivery she went back to work in March 2019.  She reports that her appetite is good and she usually eats or most of the day.  Sometimes unhealthy food in order to gain more weight.     She was diagnosed with underactive thyroid or hypothyroidism when she was a child and she has been taking levothyroxine 50 mcg daily since then.  Recently she had a thyroid level checked which showed elevated TSH and was done that love her levothyroxine dose was increased to 75 mcg daily.  She has been on this dose since 8/2/2019.  ENDO THYROID LABS-[facility] Latest Ref Rng & Units 8/2/2019   TSH 0.40 - 4.00 mU/L 5.43 (H)      A detailed review of system was done today and she denies nausea, vomiting, diarrhea, palpitations, cough, shortness of breath, any urine changes, headache, vision changes, and her appetite is good.  She has not gotten her menses  back since delivery.  She is noticed increased bruising.  Reports that she has been more emotional and more anxious since delivery; she added which is expected and a new mom.      Allergies   Allergen Reactions     Nkda [No Known Drug Allergies]      Seasonal Allergies        Current Outpatient Medications   Medication Sig Dispense Refill     levothyroxine (SYNTHROID/LEVOTHROID) 75 MCG tablet Take 1 tablet (75 mcg) by mouth daily 90 tablet 1     Prenatal Vit-Fe Fumarate-FA (PRENATAL MULTIVITAMIN PLUS IRON) 27-0.8 MG TABS per tablet Take 1 tablet by mouth daily       cephALEXin (KEFLEX) 500 MG capsule Take 500 mg by mouth       levothyroxine (SYNTHROID/LEVOTHROID) 50 MCG tablet TAKE 1 TABLET BY MOUTH EVERY DAY (Patient not taking: Reported on 8/27/2019) 90 tablet 3     norethindrone (MICRONOR) 0.35 MG tablet Take 1 tablet (0.35 mg) by mouth daily (Patient not taking: Reported on 8/2/2019) 90 tablet 3       Review of Systems     11 point review system (Constitutional, HENT, Eyes, Respiratory, Cardiovascular, Gastrointestinal, Genitourinary, Musculoskeletal,Neurological, Psychiatric/Behavioural, Endocrine) is negative or is as per HPI above    Past Medical History:   Diagnosis Date     GERD (gastroesophageal reflux disease)      Hypothyroid      Intradermal nevus 03/27/2007    right arm-irritated intradermal nevus       Past Surgical History:   Procedure Laterality Date     NO HISTORY OF SURGERY       SALPINGECTOMY Left 11/28/2017    Ectopic pregnancy       Family History   Problem Relation Age of Onset     Neurologic Disorder Mother         MSAND LUPUS     Thyroid Disease Mother      Depression Father      Thyroid Disease Father      Depression Brother      Thyroid Disease Maternal Grandmother      Cancer Maternal Grandmother      Thyroid Disease Maternal Grandfather      Thyroid Disease Paternal Grandmother      Thyroid Disease Paternal Grandfather      Diabetes No family hx of      Hypertension No family hx of       Cerebrovascular Disease No family hx of      Glaucoma No family hx of      Macular Degeneration No family hx of        Social History     Socioeconomic History     Marital status:      Spouse name: None     Number of children: None     Years of education: None     Highest education level: None   Occupational History     None   Social Needs     Financial resource strain: None     Food insecurity:     Worry: None     Inability: None     Transportation needs:     Medical: None     Non-medical: None   Tobacco Use     Smoking status: Former Smoker     Last attempt to quit: 2008     Years since quittin.1     Smokeless tobacco: Never Used     Tobacco comment: Lives in smoke free household   Substance and Sexual Activity     Alcohol use: No     Drug use: No     Sexual activity: Yes     Partners: Male   Lifestyle     Physical activity:     Days per week: None     Minutes per session: None     Stress: None   Relationships     Social connections:     Talks on phone: None     Gets together: None     Attends [employer] service: None     Active member of club or organization: None     Attends meetings of clubs or organizations: None     Relationship status: None     Intimate partner violence:     Fear of current or ex partner: None     Emotionally abused: None     Physically abused: None     Forced sexual activity: None   Other Topics Concern     Parent/sibling w/ CABG, MI or angioplasty before 65F 55M? Not Asked   Social History Narrative     None       Objective:   /72 (BP Location: Left arm, Patient Position: Chair, Cuff Size: Adult Regular)   Pulse 69   Wt 47.2 kg (104 lb)   SpO2 98%   BMI 17.85 kg/m    Constitutional: Pleasant no acute cardiopulmonary distress.   EYES: anicteric, normal extra-ocular movements, no lid lag or retraction, is equal and reactive to light bilaterally.  HEENT: Mouth/Throat: Mucous membrane is moist. Oropharynx is clear.  Thyroid examination: Normal size and no nodules  appreciated.  Cardiovascular: RRR, S1, S2 normal.   Pulmonary/Chest: CTAB. No wheezing or rales.   Abdominal: +BS. Non tender to palpation.  Stretch marks: None.  Neurological: Alert and oriented.  No tremor and reflexes are symmetrical bilaterally and within the normal limits. Muscle strength 5/5.   Extremities: No edema.  Skin: normal texture, color.  Psychological: appropriate mood and affect     In House Labs:       TSH   Date Value Ref Range Status   08/02/2019 5.43 (H) 0.40 - 4.00 mU/L Final   10/09/2018 2.15 0.40 - 4.00 mU/L Final   07/31/2018 1.75 0.40 - 4.00 mU/L Final   04/12/2018 2.74 0.40 - 4.00 mU/L Final   03/23/2017 2.22 0.40 - 4.00 mU/L Final       Creatinine   Date Value Ref Range Status   04/22/2009 0.85 0.52 - 1.04 mg/dL Final     Comment:     New IDMS-traceable calibration  beginning 5/1/08   ]    Results for LIZA SEBASTIAN (MRN [id number]) as of 9/10/2019 10:40   Ref. Range 9/9/2019 14:27   Sodium Latest Ref Range: 133 - 144 mmol/L 144   Potassium Latest Ref Range: 3.4 - 5.3 mmol/L 3.8   Chloride Latest Ref Range: 94 - 109 mmol/L 108   Carbon Dioxide Latest Ref Range: 20 - 32 mmol/L 31   Urea Nitrogen Latest Ref Range: 7 - 30 mg/dL 14   Creatinine Latest Ref Range: 0.52 - 1.04 mg/dL 0.78   GFR Estimate Latest Ref Range: >60 mL/min/1.73_m2 >90   GFR Estimate If Black Latest Ref Range: >60 mL/min/1.73_m2 >90   Calcium Latest Ref Range: 8.5 - 10.1 mg/dL 8.6   Anion Gap Latest Ref Range: 3 - 14 mmol/L 5   Albumin Latest Ref Range: 3.4 - 5.0 g/dL 3.9   Protein Total Latest Ref Range: 6.8 - 8.8 g/dL 7.6   Bilirubin Total Latest Ref Range: 0.2 - 1.3 mg/dL 0.3   Alkaline Phosphatase Latest Ref Range: 40 - 150 U/L 70   ALT Latest Ref Range: 0 - 50 U/L 18   AST Latest Ref Range: 0 - 45 U/L 14   T4 Free Latest Ref Range: 0.76 - 1.46 ng/dL 1.13   TSH Latest Ref Range: 0.40 - 4.00 mU/L 2.23   Glucose Latest Ref Range: 70 - 99 mg/dL 70   WBC Latest Ref Range: 4.0 - 11.0 10e9/L 5.1   Hemoglobin Latest Ref  Range: 11.7 - 15.7 g/dL 13.4   Hematocrit Latest Ref Range: 35.0 - 47.0 % 42.3   Platelet Count Latest Ref Range: 150 - 450 10e9/L 229   RBC Count Latest Ref Range: 3.8 - 5.2 10e12/L 4.94   MCV Latest Ref Range: 78 - 100 fl 86   MCH Latest Ref Range: 26.5 - 33.0 pg 27.1   MCHC Latest Ref Range: 31.5 - 36.5 g/dL 31.7   RDW Latest Ref Range: 10.0 - 15.0 % 11.9     Assessment/Treatment Plan:      Weight loss: Baseline weight reported to be 114 pounds.  Delivery in November 2018; immediately after delivery she was 119 pounds.  In January 2019 weight 121 pounds and since then June , 106 pounds, August 101 pounds and September 9; today's visit 104 pounds.  March 2019; started working after delivery.  Between January 2019 to June 2019 she has lost more than 10 percent of body weight.  During this period of time; has started working.  Weight loss might be related to the lifestyle changes in addition to breast-feeding.  Weight has been stable since June 2019 and since August 2019 she has gained about 2 pounds.  In order to rule out other possible underlying pathology; we checked complete metabolic panel including liver and kidney function tests, complete blood count and thyroid labs including TSH and free T4.  The results were noted.  She has a normal sodium and potassium and other electrolytes including normal kidney function test.  Her LFTs were within the normal limits.  Her free T4 and TSH were within the normal limits.  Complete blood count is within the normal limits.    Further plan based on results.  She does not have signs and symptoms of adrenal insufficiency or [place]'s disease at this time and work-up is not indicated at this point in time.  However, if her weight continues to drop we will screen her for adrenal insufficiency.  She does not have respiratory symptoms at this point in time; chest x-ray is part of work-up for weight loss and this was ordered after the blood work results.    History of  hypothyroidism on replacement since childhood: She was diagnosed with underactive thyroid or hypothyroidism when she was a child and she has been taking levothyroxine 50 mcg daily since then.  Recently she had a thyroid level checked which showed elevated TSH and was done that love her levothyroxine dose was increased to 75 mcg daily.  She has been on this dose since 8/2/2019. Check TSH, T4/  Strong family history of thyroid disease and will check TPO as well.  As noted above her TFT; is within the normal limits at this point in time.  Continue current dose of levothyroxine.  The TPO is still pending.     Patient Instructions   Blood work and we will contact you with the results.       I will contact the patient with the test results.  Return to clinic in 2 months.    Test and/or medications prescribed today:  Orders Placed This Encounter   Procedures     TSH     T4 free     Comprehensive metabolic panel     Thyroid peroxidase antibody     CBC with platelets         Ivelisse Carlson MD  Staff Endocrinologist    [phone]  Division of Endocrinology and Diabetes

## 2019-09-09 NOTE — LETTER
9/9/2019         RE: Lalitha Dey  [id number] [address] [place], [place] [id number]        Dear Colleague,    Thank you for referring your patient, Lalitha Dey, to the [facility]. Please see a copy of my visit note below.    Endocrinology Clinic Visit    Chief Complaint: Weight Loss (Patient is currently nursing)     Information obtained from:Patient    Subjective:         HPI: Lalitha Dey is a 34 year old female with history of thyroid ds who is seen in consultation at No ref. provider found's request for weight loss.     64-year-old female patient who is here today for concern regarding weight loss.  Patient delivered her first baby in November 2018.  Prior to that her baseline weight is around 114 pounds.  She noted recently during her emergency room visit that her weight was down to 101 pounds which alarmed her.  She was seen in the emergency room with the working diagnosis of chest wall pain and mastitis.  She was followed up in clinic the doctor and she was told to have possible breast mass which led to breast imaging studies,mammography and ultrasound of the breast.  Both mammography and ultrasound findings were within the normal limits.  [facility]O VITALS-[facility] 9/9/2019 8/2/2019 6/21/2019 1/21/2019   Weight 104 lb 101 lb 8 oz 106 lb 9.6 oz 121 lb     [facility] 1/8/2019   Weight 121 lb 3.2 oz   After delivery she went back to work in March 2019.  She reports that her appetite is good and she usually eats or most of the day.  Sometimes unhealthy food in order to gain more weight.     She was diagnosed with underactive thyroid or hypothyroidism when she was a child and she has been taking levothyroxine 50 mcg daily since then.  Recently she had a thyroid level checked which showed elevated TSH and was done that love her levothyroxine dose was increased to 75 mcg daily.  She has been on this dose since 8/2/2019.  ENDO THYROID LABS-[facility] Latest Ref Rng & Units 8/2/2019   TSH 0.40 - 4.00  mU/L 5.43 (H)      A detailed review of system was done today and she denies nausea, vomiting, diarrhea, palpitations, cough, shortness of breath, any urine changes, headache, vision changes, and her appetite is good.  She has not gotten her menses back since delivery.  She is noticed increased bruising.  Reports that she has been more emotional and more anxious since delivery; she added which is expected and a new mom.      Allergies   Allergen Reactions     Nkda [No Known Drug Allergies]      Seasonal Allergies        Current Outpatient Medications   Medication Sig Dispense Refill     levothyroxine (SYNTHROID/LEVOTHROID) 75 MCG tablet Take 1 tablet (75 mcg) by mouth daily 90 tablet 1     Prenatal Vit-Fe Fumarate-FA (PRENATAL MULTIVITAMIN PLUS IRON) 27-0.8 MG TABS per tablet Take 1 tablet by mouth daily       cephALEXin (KEFLEX) 500 MG capsule Take 500 mg by mouth       levothyroxine (SYNTHROID/LEVOTHROID) 50 MCG tablet TAKE 1 TABLET BY MOUTH EVERY DAY (Patient not taking: Reported on 8/27/2019) 90 tablet 3     norethindrone (MICRONOR) 0.35 MG tablet Take 1 tablet (0.35 mg) by mouth daily (Patient not taking: Reported on 8/2/2019) 90 tablet 3       Review of Systems     11 point review system (Constitutional, HENT, Eyes, Respiratory, Cardiovascular, Gastrointestinal, Genitourinary, Musculoskeletal,Neurological, Psychiatric/Behavioural, Endocrine) is negative or is as per HPI above    Past Medical History:   Diagnosis Date     GERD (gastroesophageal reflux disease)      Hypothyroid      Intradermal nevus 03/27/2007    right arm-irritated intradermal nevus       Past Surgical History:   Procedure Laterality Date     NO HISTORY OF SURGERY       SALPINGECTOMY Left 11/28/2017    Ectopic pregnancy       Family History   Problem Relation Age of Onset     Neurologic Disorder Mother         MSAND LUPUS     Thyroid Disease Mother      Depression Father      Thyroid Disease Father      Depression Brother      Thyroid Disease  Maternal Grandmother      Cancer Maternal Grandmother      Thyroid Disease Maternal Grandfather      Thyroid Disease Paternal Grandmother      Thyroid Disease Paternal Grandfather      Diabetes No family hx of      Hypertension No family hx of      Cerebrovascular Disease No family hx of      Glaucoma No family hx of      Macular Degeneration No family hx of        Social History     Socioeconomic History     Marital status:      Spouse name: None     Number of children: None     Years of education: None     Highest education level: None   Occupational History     None   Social Needs     Financial resource strain: None     Food insecurity:     Worry: None     Inability: None     Transportation needs:     Medical: None     Non-medical: None   Tobacco Use     Smoking status: Former Smoker     Last attempt to quit: 2008     Years since quittin.1     Smokeless tobacco: Never Used     Tobacco comment: Lives in smoke free household   Substance and Sexual Activity     Alcohol use: No     Drug use: No     Sexual activity: Yes     Partners: Male   Lifestyle     Physical activity:     Days per week: None     Minutes per session: None     Stress: None   Relationships     Social connections:     Talks on phone: None     Gets together: None     Attends [employer] service: None     Active member of club or organization: None     Attends meetings of clubs or organizations: None     Relationship status: None     Intimate partner violence:     Fear of current or ex partner: None     Emotionally abused: None     Physically abused: None     Forced sexual activity: None   Other Topics Concern     Parent/sibling w/ CABG, MI or angioplasty before 65F 55M? Not Asked   Social History Narrative     None       Objective:   /72 (BP Location: Left arm, Patient Position: Chair, Cuff Size: Adult Regular)   Pulse 69   Wt 47.2 kg (104 lb)   SpO2 98%   BMI 17.85 kg/m     Constitutional: Pleasant no acute cardiopulmonary  distress.   EYES: anicteric, normal extra-ocular movements, no lid lag or retraction, is equal and reactive to light bilaterally.  HEENT: Mouth/Throat: Mucous membrane is moist. Oropharynx is clear.  Thyroid examination: Normal size and no nodules appreciated.  Cardiovascular: RRR, S1, S2 normal.   Pulmonary/Chest: CTAB. No wheezing or rales.   Abdominal: +BS. Non tender to palpation.  Stretch marks: None.  Neurological: Alert and oriented.  No tremor and reflexes are symmetrical bilaterally and within the normal limits. Muscle strength 5/5.   Extremities: No edema.  Skin: normal texture, color.  Psychological: appropriate mood and affect     In House Labs:       TSH   Date Value Ref Range Status   08/02/2019 5.43 (H) 0.40 - 4.00 mU/L Final   10/09/2018 2.15 0.40 - 4.00 mU/L Final   07/31/2018 1.75 0.40 - 4.00 mU/L Final   04/12/2018 2.74 0.40 - 4.00 mU/L Final   03/23/2017 2.22 0.40 - 4.00 mU/L Final       Creatinine   Date Value Ref Range Status   04/22/2009 0.85 0.52 - 1.04 mg/dL Final     Comment:     New IDMS-traceable calibration  beginning 5/1/08   ]    Results for LIZA SEBASTIAN (MRN [id number]) as of 9/10/2019 10:40   Ref. Range 9/9/2019 14:27   Sodium Latest Ref Range: 133 - 144 mmol/L 144   Potassium Latest Ref Range: 3.4 - 5.3 mmol/L 3.8   Chloride Latest Ref Range: 94 - 109 mmol/L 108   Carbon Dioxide Latest Ref Range: 20 - 32 mmol/L 31   Urea Nitrogen Latest Ref Range: 7 - 30 mg/dL 14   Creatinine Latest Ref Range: 0.52 - 1.04 mg/dL 0.78   GFR Estimate Latest Ref Range: >60 mL/min/1.73_m2 >90   GFR Estimate If Black Latest Ref Range: >60 mL/min/1.73_m2 >90   Calcium Latest Ref Range: 8.5 - 10.1 mg/dL 8.6   Anion Gap Latest Ref Range: 3 - 14 mmol/L 5   Albumin Latest Ref Range: 3.4 - 5.0 g/dL 3.9   Protein Total Latest Ref Range: 6.8 - 8.8 g/dL 7.6   Bilirubin Total Latest Ref Range: 0.2 - 1.3 mg/dL 0.3   Alkaline Phosphatase Latest Ref Range: 40 - 150 U/L 70   ALT Latest Ref Range: 0 - 50 U/L 18    AST Latest Ref Range: 0 - 45 U/L 14   T4 Free Latest Ref Range: 0.76 - 1.46 ng/dL 1.13   TSH Latest Ref Range: 0.40 - 4.00 mU/L 2.23   Glucose Latest Ref Range: 70 - 99 mg/dL 70   WBC Latest Ref Range: 4.0 - 11.0 10e9/L 5.1   Hemoglobin Latest Ref Range: 11.7 - 15.7 g/dL 13.4   Hematocrit Latest Ref Range: 35.0 - 47.0 % 42.3   Platelet Count Latest Ref Range: 150 - 450 10e9/L 229   RBC Count Latest Ref Range: 3.8 - 5.2 10e12/L 4.94   MCV Latest Ref Range: 78 - 100 fl 86   MCH Latest Ref Range: 26.5 - 33.0 pg 27.1   MCHC Latest Ref Range: 31.5 - 36.5 g/dL 31.7   RDW Latest Ref Range: 10.0 - 15.0 % 11.9     Assessment/Treatment Plan:      Weight loss: Baseline weight reported to be 114 pounds.  Delivery in November 2018; immediately after delivery she was 119 pounds.  In January 2019 weight 121 pounds and since then June , 106 pounds, August 101 pounds and September 9; today's visit 104 pounds.  March 2019; started working after delivery.  Between January 2019 to June 2019 she has lost more than 10 percent of body weight.  During this period of time; has started working.  Weight loss might be related to the lifestyle changes in addition to breast-feeding.  Weight has been stable since June 2019 and since August 2019 she has gained about 2 pounds.  In order to rule out other possible underlying pathology; we checked complete metabolic panel including liver and kidney function tests, complete blood count and thyroid labs including TSH and free T4.  The results were noted.  She has a normal sodium and potassium and other electrolytes including normal kidney function test.  Her LFTs were within the normal limits.  Her free T4 and TSH were within the normal limits.  Complete blood count is within the normal limits.    Further plan based on results.  She does not have signs and symptoms of adrenal insufficiency or Pranay's disease at this time and work-up is not indicated at this point in time.  However, if her weight  continues to drop we will screen her for adrenal insufficiency.  She does not have respiratory symptoms at this point in time; chest x-ray is part of work-up for weight loss and this was ordered after the blood work results.    History of hypothyroidism on replacement since childhood: She was diagnosed with underactive thyroid or hypothyroidism when she was a child and she has been taking levothyroxine 50 mcg daily since then.  Recently she had a thyroid level checked which showed elevated TSH and was done that love her levothyroxine dose was increased to 75 mcg daily.  She has been on this dose since 8/2/2019. Check TSH, T4/  Strong family history of thyroid disease and will check TPO as well.  As noted above her TFT; is within the normal limits at this point in time.  Continue current dose of levothyroxine.  The TPO is still pending.     Patient Instructions   Blood work and we will contact you with the results.       I will contact the patient with the test results.  Return to clinic in 2 months.    Test and/or medications prescribed today:  Orders Placed This Encounter   Procedures     TSH     T4 free     Comprehensive metabolic panel     Thyroid peroxidase antibody     CBC with platelets         Ivelisse Carlson MD  Staff Endocrinologist    [phone]  Division of Endocrinology and Diabetes            Again, thank you for allowing me to participate in the care of your patient.        Sincerely,        Ivelisse Carlson MD

## 2019-09-10 LAB — THYROPEROXIDASE AB SERPL-ACNC: 484 IU/ML

## 2019-09-10 NOTE — RESULT ENCOUNTER NOTE
Lalitha,    Please find blood work results which were done at the time of your endocrinology clinic visit.  1.  TSH and free T4 levels are within normal limits.  Please continue your current dose of levothyroxine without any change.  2.  Electrolytes including sodium, potassium, kidney function and liver function test results are within the normal limits.  The complete blood count that included hemoglobin is also within the normal limits.    Based on the blood work we have so far; we did not find any explanation for weight loss.  We will continue to monitor closely.  A chest x-ray is part of work-up for weight loss; when we do not get an explanation from the blood work.  Please have the chest x-ray completed at your convenience.  This can be done at the [facility] closer to your house.    As we have discussed at the time of your visit, please send us an update on your weight in 3-4 weeks.    Please let me know if you have any questions at this time.    Ivelisse Carlson MD

## 2019-10-29 ENCOUNTER — TELEPHONE (OUTPATIENT)
Dept: OBGYN | Facility: CLINIC | Age: 34
End: 2019-10-29

## 2019-10-29 DIAGNOSIS — E03.9 HYPOTHYROIDISM, UNSPECIFIED TYPE: ICD-10-CM

## 2019-11-03 ENCOUNTER — HEALTH MAINTENANCE LETTER (OUTPATIENT)
Age: 34
End: 2019-11-03

## 2020-01-28 DIAGNOSIS — E03.9 HYPOTHYROIDISM, UNSPECIFIED TYPE: ICD-10-CM

## 2020-01-28 DIAGNOSIS — Z53.9 ERRONEOUS ENCOUNTER--DISREGARD: Primary | ICD-10-CM

## 2020-01-28 RX ORDER — LEVOTHYROXINE SODIUM 75 UG/1
75 TABLET ORAL DAILY
Qty: 90 TABLET | Refills: 1 | Status: CANCELLED | OUTPATIENT
Start: 2020-01-28

## 2020-01-28 NOTE — TELEPHONE ENCOUNTER
Medication:   Levothyroxine  Last Written Prescription Date:  8/2/19  Last Fill Quantity: 90,   # refills: 1  Last Office Visit: 8/2/19  Future Office visit:  No appts scheduled     Elham Pearce

## 2020-01-28 NOTE — TELEPHONE ENCOUNTER
"Thyroid Protocol Passed   levothyroxine (SYNTHROID/LEVOTHROID) 75 MCG tablet   Rerun Protocol (1/28/2020 2:39 PM)      Patient is 12 years or older         Recent (12 mo) or future (30 days) visit within the authorizing provider's specialty      Patient has had an office visit with the authorizing provider or a provider within the authorizing providers department within the previous 12 mos or has a future within next 30 days. See \"Patient Info\" tab in inbasket, or \"Choose Columns\" in Meds & Orders section of the refill encounter.              Medication is active on med list         Normal TSH on file in past 12 months          Recent Labs   Lab Test 09/09/19  1427   TSH 2.23             No active pregnancy on record      If patient is pregnant or has had a positive pregnancy test, please check TSH.         No positive pregnancy test in past 12 months      If patient is pregnant or has had a positive pregnancy test, please check TSH.                  "

## 2020-01-28 NOTE — PROGRESS NOTES
Medication:   Levothyroxine  Last Written Prescription Date:  8/2/19  Last Fill Quantity: 90   # refills: 1  Last Office Visit: 8/2/19  Future Office visit:  Nothing scheduled        Elham Pearce

## 2020-01-29 RX ORDER — LEVOTHYROXINE SODIUM 75 UG/1
75 TABLET ORAL DAILY
Qty: 90 TABLET | Refills: 1 | Status: SHIPPED | OUTPATIENT
Start: 2020-01-29 | End: 2021-01-25

## 2020-01-29 NOTE — TELEPHONE ENCOUNTER
Patient was seen in clinic with Dr. Ch on 8/2/2020, Synthroid ordered and patient was advised to follow up with Endocrinology. Patient saw Endocrinology on 9/9/2010 and was told to follow up in clinic in 2 months.    RN will route to Dr. Carlson' team to see if they would like to take over ordering of Synthroid at this time.     Bianka Biggs RN on 1/29/2020 at 9:49 AM

## 2020-02-10 ENCOUNTER — HEALTH MAINTENANCE LETTER (OUTPATIENT)
Age: 35
End: 2020-02-10

## 2020-02-11 ENCOUNTER — OFFICE VISIT (OUTPATIENT)
Dept: ENDOCRINOLOGY | Facility: CLINIC | Age: 35
End: 2020-02-11
Payer: COMMERCIAL

## 2020-02-11 VITALS
DIASTOLIC BLOOD PRESSURE: 66 MMHG | HEIGHT: 64 IN | WEIGHT: 105.6 LBS | BODY MASS INDEX: 18.03 KG/M2 | HEART RATE: 78 BPM | SYSTOLIC BLOOD PRESSURE: 108 MMHG | OXYGEN SATURATION: 97 %

## 2020-02-11 DIAGNOSIS — E06.3 HYPOTHYROIDISM DUE TO HASHIMOTO'S THYROIDITIS: Primary | ICD-10-CM

## 2020-02-11 DIAGNOSIS — R59.9 REACTIVE LYMPHADENOPATHY: ICD-10-CM

## 2020-02-11 LAB
T4 FREE SERPL-MCNC: 1.19 NG/DL (ref 0.76–1.46)
TSH SERPL DL<=0.005 MIU/L-ACNC: 1.54 MU/L (ref 0.4–4)

## 2020-02-11 PROCEDURE — 84439 ASSAY OF FREE THYROXINE: CPT | Performed by: INTERNAL MEDICINE

## 2020-02-11 PROCEDURE — 36415 COLL VENOUS BLD VENIPUNCTURE: CPT | Performed by: INTERNAL MEDICINE

## 2020-02-11 PROCEDURE — 84443 ASSAY THYROID STIM HORMONE: CPT | Performed by: INTERNAL MEDICINE

## 2020-02-11 PROCEDURE — 99214 OFFICE O/P EST MOD 30 MIN: CPT | Performed by: INTERNAL MEDICINE

## 2020-02-11 ASSESSMENT — MIFFLIN-ST. JEOR: SCORE: 1176.13

## 2020-02-11 NOTE — PROGRESS NOTES
Endocrinology Clinic Visit    Chief Complaint: Thyroid Disease     Information obtained from:Patient    Subjective:         HPI: Lalitha Dey is a 34 year old female with history of thyroid ds who is here for routine follow up.    64-year-old female patient who is here today for concern regarding weight loss.  Patient delivered her first baby in November 2018.  Prior to that her baseline weight is around 114 pounds.  She dropped to 101 pounds which alarmed her and that is when she came to follow up with us.   She currently weighs 105 pounds.  Reports normal appetite.  No nausea or vomiting.  She has been physically very active due to activity with her 2-year-old.  And she attributes her not gaining weight further and she attributes not getting with secondary to that.    [facility]ALS-[facility] 2/11/2020 9/9/2019 8/2/2019 6/21/2019   Weight 105 lb 9.6 oz 104 lb 101 lb 8 oz 106 lb 9.6 oz     After delivery she went back to work in March 2019.      Hypothyroidism secondary to Hashimoto thyroiditis currently on levothyroxine 75 mcg daily.  Reports that she has been losing more hair since her last visit last fall.  She would like her thyroid checked.  No palpitations, heat intolerance or GI changes.       A detailed review of system was done today and she denies nausea, vomiting, diarrhea, palpitations, cough, shortness of breath, any urine changes, headache, vision changes, and her appetite is good.  She has had menses for the months of December and January which was regular..     She reports swollen cannot which was sore few days ago involving the left side of the neck just below the chin.    Allergies   Allergen Reactions     Nkda [No Known Drug Allergies]      Seasonal Allergies        Current Outpatient Medications   Medication Sig Dispense Refill     levothyroxine (SYNTHROID/LEVOTHROID) 75 MCG tablet Take 1 tablet (75 mcg) by mouth daily 90 tablet 1     Prenatal Vit-Fe Fumarate-FA (PRENATAL MULTIVITAMIN PLUS IRON)  27-0.8 MG TABS per tablet Take 1 tablet by mouth daily       norethindrone (MICRONOR) 0.35 MG tablet Take 1 tablet (0.35 mg) by mouth daily (Patient not taking: Reported on 2019) 90 tablet 3       Review of Systems     9 she reports that she felt point review system (Constitutional, HENT, Eyes, Respiratory, Cardiovascular, Gastrointestinal, Genitourinary, Musculoskeletal,Neurological, Psychiatric/Behavioural, Endocrine) is negative or is as per HPI above    Past Medical History:   Diagnosis Date     GERD (gastroesophageal reflux disease)      Hypothyroid      Intradermal nevus 2007    right arm-irritated intradermal nevus       Past Surgical History:   Procedure Laterality Date     NO HISTORY OF SURGERY       SALPINGECTOMY Left 2017    Ectopic pregnancy       Family History   Problem Relation Age of Onset     Neurologic Disorder Mother         MSAND LUPUS     Thyroid Disease Mother      Depression Father      Thyroid Disease Father      Depression Brother      Thyroid Disease Maternal Grandmother      Cancer Maternal Grandmother      Thyroid Disease Maternal Grandfather      Thyroid Disease Paternal Grandmother      Thyroid Disease Paternal Grandfather      Diabetes No family hx of      Hypertension No family hx of      Cerebrovascular Disease No family hx of      Glaucoma No family hx of      Macular Degeneration No family hx of        Social History     Socioeconomic History     Marital status:      Spouse name: None     Number of children: None     Years of education: None     Highest education level: None   Occupational History     None   Social Needs     Financial resource strain: None     Food insecurity:     Worry: None     Inability: None     Transportation needs:     Medical: None     Non-medical: None   Tobacco Use     Smoking status: Former Smoker     Last attempt to quit: 2008     Years since quittin.1     Smokeless tobacco: Never Used     Tobacco comment: Lives in smoke  "free household   Substance and Sexual Activity     Alcohol use: No     Drug use: No     Sexual activity: Yes     Partners: Male   Lifestyle     Physical activity:     Days per week: None     Minutes per session: None     Stress: None   Relationships     Social connections:     Talks on phone: None     Gets together: None     Attends Jain service: None     Active member of club or organization: None     Attends meetings of clubs or organizations: None     Relationship status: None     Intimate partner violence:     Fear of current or ex partner: None     Emotionally abused: None     Physically abused: None     Forced sexual activity: None   Other Topics Concern     Parent/sibling w/ CABG, MI or angioplasty before 65F 55M? Not Asked   Social History Narrative     None       Objective:   /66 (BP Location: Left arm, Patient Position: Sitting, Cuff Size: Adult Small)   Pulse 78   Ht 1.629 m (5' 4.13\")   Wt 47.9 kg (105 lb 9.6 oz)   SpO2 97%   BMI 18.05 kg/m    Constitutional: Pleasant no acute cardiopulmonary distress.   EYES: anicteric, normal extra-ocular movements, no lid lag or retraction, is equal and reactive to light bilaterally.  HEENT: Mouth/Throat: Mucous membrane is moist. Oropharynx is clear.  Thyroid examination: Normal size and no nodules appreciated.  Cervical lymph node level 2 left side about 0.5 cm, mobile and nontender.  Cardiovascular: RRR, S1, S2 normal.   Pulmonary/Chest: CTAB. No wheezing or rales.   Abdominal: +BS. Non tender to palpation.  Stretch marks: None.  Neurological: Alert and oriented.  No tremor and reflexes are symmetrical bilaterally and within the normal limits. Muscle strength 5/5.   Extremities: No edema.  Skin: normal texture, color.  Psychological: appropriate mood and affect     In House Labs:       TSH   Date Value Ref Range Status   02/11/2020 1.54 0.40 - 4.00 mU/L Final   09/09/2019 2.23 0.40 - 4.00 mU/L Final   08/02/2019 5.43 (H) 0.40 - 4.00 mU/L Final "   10/09/2018 2.15 0.40 - 4.00 mU/L Final   07/31/2018 1.75 0.40 - 4.00 mU/L Final     T4 Free   Date Value Ref Range Status   02/11/2020 1.19 0.76 - 1.46 ng/dL Final   09/09/2019 1.13 0.76 - 1.46 ng/dL Final       Creatinine   Date Value Ref Range Status   09/09/2019 0.78 0.52 - 1.04 mg/dL Final   ]    Results for LIZA SEBASTIAN (MRN [id number]) as of 9/10/2019 10:40   Ref. Range 9/9/2019 14:27   Sodium Latest Ref Range: 133 - 144 mmol/L 144   Potassium Latest Ref Range: 3.4 - 5.3 mmol/L 3.8   Chloride Latest Ref Range: 94 - 109 mmol/L 108   Carbon Dioxide Latest Ref Range: 20 - 32 mmol/L 31   Urea Nitrogen Latest Ref Range: 7 - 30 mg/dL 14   Creatinine Latest Ref Range: 0.52 - 1.04 mg/dL 0.78   GFR Estimate Latest Ref Range: >60 mL/min/1.73_m2 >90   GFR Estimate If Black Latest Ref Range: >60 mL/min/1.73_m2 >90   Calcium Latest Ref Range: 8.5 - 10.1 mg/dL 8.6   Anion Gap Latest Ref Range: 3 - 14 mmol/L 5   Albumin Latest Ref Range: 3.4 - 5.0 g/dL 3.9   Protein Total Latest Ref Range: 6.8 - 8.8 g/dL 7.6   Bilirubin Total Latest Ref Range: 0.2 - 1.3 mg/dL 0.3   Alkaline Phosphatase Latest Ref Range: 40 - 150 U/L 70   ALT Latest Ref Range: 0 - 50 U/L 18   AST Latest Ref Range: 0 - 45 U/L 14   T4 Free Latest Ref Range: 0.76 - 1.46 ng/dL 1.13   TSH Latest Ref Range: 0.40 - 4.00 mU/L 2.23   Glucose Latest Ref Range: 70 - 99 mg/dL 70   WBC Latest Ref Range: 4.0 - 11.0 10e9/L 5.1   Hemoglobin Latest Ref Range: 11.7 - 15.7 g/dL 13.4   Hematocrit Latest Ref Range: 35.0 - 47.0 % 42.3   Platelet Count Latest Ref Range: 150 - 450 10e9/L 229   RBC Count Latest Ref Range: 3.8 - 5.2 10e12/L 4.94   MCV Latest Ref Range: 78 - 100 fl 86   MCH Latest Ref Range: 26.5 - 33.0 pg 27.1   MCHC Latest Ref Range: 31.5 - 36.5 g/dL 31.7   RDW Latest Ref Range: 10.0 - 15.0 % 11.9     Assessment/Treatment Plan:      Weight stable now:   [facility] 2/11/2020 9/9/2019 8/2/2019 6/21/2019   Weight 105 lb 9.6 oz 104 lb 101 lb 8 oz 106 lb 9.6 oz    Baseline weight reported to be 114 pounds.  Her weight has been stable over the last 6 months which is reassuring.  Part of the reason she is not gaining weight is; she is physically more active due to her 2-year-old.  We will continue to monitor her weight however the fact that is been stable over the last 6 months is reassuring that the patient is comfortable with plan.      In order to rule out other possible underlying pathology; we checked complete metabolic panel including liver and kidney function tests, complete blood count and thyroid labs including TSH and free T4.  The results were noted.  She has a normal sodium and potassium and other electrolytes including normal kidney function test.  Her LFTs were within the normal limits.  Her free T4 and TSH were within the normal limits.  Complete blood count is within the normal limits.    Hypothyroidism secondary to Hashimoto thyroiditis: She was diagnosed with underactive thyroid or hypothyroidism when she was a child and she has been taking levothyroxine 50 mcg daily since then.  ew months ago she had a thyroid level checked which showed elevated TSH and  levothyroxine dose was increased to 75 mcg daily.      ENDO THYROID LABS-[facility] Latest Ref Rng & Units 2/11/2020   TSH 0.40 - 4.00 mU/L 1.54   T4 FREE 0.76 - 1.46 ng/dL 1.19   Thyroid labs checked today within the normal limits. Continue without any change.     Reactive cervical lymphadenopathy/resolving: Upper neck region on the left side and it was tender few weeks ago per report.  Now with interval improvement.  On exam today no worrisome lymphadenopathy.  On the left side she has 0.5 centimeters cervical lymph node at level 2.  It was nontender.  I have advised her to follow-up with her primary care physician if enlargement or persistence of nodule.  Overall, based on history likely reactive lymphadenopathy which is resolving.       I will contact the patient with the test results.  Return to clinic in 12  months.    Test and/or medications prescribed today:  Orders Placed This Encounter   Procedures     TSH     T4 free         Ivelisse Carlson MD  Staff Endocrinologist    [phone]  Division of Endocrinology and Diabetes

## 2020-02-11 NOTE — NURSING NOTE
"Lalitha Dey's goals for this visit include:   Chief Complaint   Patient presents with     Thyroid Disease     She requests these members of her care team be copied on today's visit information: Yes    PCP: Kehr, Kristen M    Referring Provider:  Kristen M Kehr, PA-C  [address] [place] [address]    /66 (BP Location: Left arm, Patient Position: Sitting, Cuff Size: Adult Small)   Pulse 78   Ht 1.629 m (5' 4.13\")   Wt 47.9 kg (105 lb 9.6 oz)   SpO2 97%   BMI 18.05 kg/m      Do you need any medication refills at today's visit? No    "

## 2020-02-11 NOTE — LETTER
2/11/2020         RE: Lalitha Dey  [id number] [address] [place] [id number]        Dear Colleague,    Thank you for referring your patient, Lalitha Dey, to the [facility]. Please see a copy of my visit note below.    No notes on file    Again, thank you for allowing me to participate in the care of your patient.        Sincerely,        Ivelisse Carlson MD

## 2020-02-28 ENCOUNTER — OFFICE VISIT (OUTPATIENT)
Dept: PEDIATRICS | Facility: CLINIC | Age: 35
End: 2020-02-28
Payer: COMMERCIAL

## 2020-02-28 VITALS
DIASTOLIC BLOOD PRESSURE: 62 MMHG | HEART RATE: 74 BPM | TEMPERATURE: 97.2 F | SYSTOLIC BLOOD PRESSURE: 112 MMHG | OXYGEN SATURATION: 97 %

## 2020-02-28 DIAGNOSIS — J02.9 ACUTE PHARYNGITIS, UNSPECIFIED ETIOLOGY: Primary | ICD-10-CM

## 2020-02-28 LAB
DEPRECATED S PYO AG THROAT QL EIA: NEGATIVE
SPECIMEN SOURCE: NORMAL
SPECIMEN SOURCE: NORMAL
STREP GROUP A PCR: NOT DETECTED

## 2020-02-28 PROCEDURE — 87651 STREP A DNA AMP PROBE: CPT | Performed by: FAMILY MEDICINE

## 2020-02-28 PROCEDURE — 99213 OFFICE O/P EST LOW 20 MIN: CPT | Performed by: FAMILY MEDICINE

## 2020-02-28 PROCEDURE — 40001204 ZZHCL STATISTIC STREP A RAPID: Performed by: FAMILY MEDICINE

## 2020-02-28 RX ORDER — LEVOTHYROXINE SODIUM 50 UG/1
TABLET ORAL
Refills: 2 | COMMUNITY
Start: 2019-10-25 | End: 2020-02-28

## 2020-02-28 ASSESSMENT — PAIN SCALES - GENERAL: PAINLEVEL: NO PAIN (0)

## 2020-02-28 NOTE — PATIENT INSTRUCTIONS
Patient Education     Pharyngitis (Sore Throat), Report Pending    Pharyngitis (sore throat) is often due to a virus. It can also be caused by streptococcus (strep), bacteria. This is often called strep throat. Both viral and strep infections can cause throat pain that is worse when swallowing, aching all over, headache, and fever. Both types of infections are contagious. They may be spread by coughing, kissing, or touching others after touching your mouth or nose.  A test has been done to find out if you or your child have strep throat. Call this facility or your healthcare provider if you were not given your test results. If the test is positive for strep infection, you will need to take antibiotic medicines. A prescription can be called into your pharmacy at that time. If the test is negative, you probably have a viral pharyngitis. This does not need to be treated with antibiotics. Until you receive the results of the strep test, you should stay home from work. If your child is being tested, he or she should stay home from school.  Home care    Rest at home. Drink plenty of fluids so you won't get dehydrated.    If the test is positive for strep, you or your child should not go to work or school for the first 2 days of taking the antibiotics. After this time, you or your child will not be contagious. You or your child can then return to work or school when feeling better.     Use the antibiotic medicine for the full 10 days. Do not stop the medicine even if you or your child feel better. This is very important to make sure the infection is fully treated. It is also important to prevent medicine-resistant germs from growing. If you or your child were given an antibiotic shot, no more antibiotics are needed.    Use throat lozenges or numbing throat sprays to help reduce pain. Gargling with warm salt water will also help reduce throat pain. Dissolve 1/2 teaspoon of salt in 1 glass of warm water. Children can sip on  juice or a popsicle. Children 5 years and older can also suck on a lollipop or hard candy.    Don't eat salty or spicy foods or give them to your child. These can irritate the throat.  Other medicine for a child: You can give your child acetaminophen for fever, fussiness, or discomfort. In babies over 6 months of age, you may use ibuprofen instead of acetaminophen. If your child has chronic liver or kidney disease or ever had a stomach ulcer or GI bleeding, talk with your child s healthcare provider before giving these medicines. Aspirin should never be used by any child under 18 years of age who has a fever. It may cause severe liver damage.  Other medicine for an adult: You may use acetaminophen or ibuprofen to control pain or fever, unless another medicine was prescribed for this. If you have chronic liver or kidney disease or ever had a stomach ulcer or GI bleeding, talk with your healthcare provider before using these medicines.  Follow-up care  Follow up with your healthcare provider or our staff if you or your child don't get better over the next week.  When to seek medical advice  Call your healthcare provider right away if any of these occur:    Fever as directed by your healthcare provider. For children, seek care if:  ? Your child is of any age and has repeated fevers above 104 F (40 C).  ? Your child is younger than 2 years of age and has a fever of 100.4 F (38 C) for more than 1 day.  ? Your child is 2 years old or older and has a fever of 100.4 F (38 C) for more than 3 days.    New or worsening ear pain, sinus pain, or headache    Painful lumps in the back of neck    Stiff neck    Lymph nodes are getting larger     Can t swallow liquids, a lot of drooling, or can t open mouth wide due to throat pain    Signs of dehydration, such as very dark urine or no urine, sunken eyes, dizziness    Trouble breathing or noisy breathing    Muffled voice    New rash    Other symptoms getting worse  Prevention  Here  are steps you can take to help prevent an infection:    Keep good hand washing habits.    Don t have close contact with people who have sore throats, colds, or other upper respiratory infections.    Don t smoke, and stay away from secondhand smoke.    Stay up to date with of your vaccines.  Date Last Reviewed: 11/1/2017 2000-2019 [employer]. [address] [place], [place] [id number]. All rights reserved. This information is not intended as a substitute for professional medical care. Always follow your healthcare professional's instructions.

## 2020-02-28 NOTE — PROGRESS NOTES
Subjective     Lalitha Dey is a 34 year old female who presents to clinic today for the following health issues:    HPI   Sore throat      Duration: a couple months    Description (location/character/radiation): erythematous, seen at the dentist and with other provider    Intensity:  mild    Accompanying signs and symptoms: none    History (similar episodes/previous evaluation): None    Precipitating or alleviating factors: None    Therapies tried and outcome: None       Patient Active Problem List   Diagnosis     CARDIOVASCULAR SCREENING; LDL GOAL LESS THAN 160     Seasonal allergic rhinitis     Acne     Conjunctivitis, allergic     Dry eyes     Blepharitis     Hypothyroidism     S/P ectopic pregnancy     Gastroesophageal reflux disease without esophagitis     S/P  section     Encounter for initial prescription of contraceptive pills      delivery delivered     Past Surgical History:   Procedure Laterality Date     NO HISTORY OF SURGERY       SALPINGECTOMY Left 2017    Ectopic pregnancy       Social History     Tobacco Use     Smoking status: Former Smoker     Last attempt to quit: 2008     Years since quittin.5     Smokeless tobacco: Never Used     Tobacco comment: Lives in smoke free household   Substance Use Topics     Alcohol use: No     Family History   Problem Relation Age of Onset     Neurologic Disorder Mother         MSAND LUPUS     Thyroid Disease Mother      Depression Father      Thyroid Disease Father      Depression Brother      Thyroid Disease Maternal Grandmother      Cancer Maternal Grandmother      Thyroid Disease Maternal Grandfather      Thyroid Disease Paternal Grandmother      Thyroid Disease Paternal Grandfather      Diabetes No family hx of      Hypertension No family hx of      Cerebrovascular Disease No family hx of      Glaucoma No family hx of      Macular Degeneration No family hx of          Current Outpatient Medications   Medication Sig Dispense  Refill     levothyroxine (SYNTHROID/LEVOTHROID) 75 MCG tablet Take 1 tablet (75 mcg) by mouth daily 90 tablet 1     Prenatal Vit-Fe Fumarate-FA (PRENATAL MULTIVITAMIN PLUS IRON) 27-0.8 MG TABS per tablet Take 1 tablet by mouth daily       levothyroxine (SYNTHROID/LEVOTHROID) 50 MCG tablet   2     norethindrone (MICRONOR) 0.35 MG tablet Take 1 tablet (0.35 mg) by mouth daily (Patient not taking: Reported on 8/2/2019) 90 tablet 3     Allergies   Allergen Reactions     Nkda [No Known Drug Allergies]      Seasonal Allergies      Recent Labs   Lab Test 02/11/20  1218 09/09/19  1427   ALT  --  18   CR  --  0.78   GFRESTIMATED  --  >90   GFRESTBLACK  --  >90   POTASSIUM  --  3.8   TSH 1.54 2.23      BP Readings from Last 3 Encounters:   02/28/20 112/62   02/11/20 108/66   09/09/19 112/72    Wt Readings from Last 3 Encounters:   02/11/20 47.9 kg (105 lb 9.6 oz)   09/09/19 47.2 kg (104 lb)   08/02/19 46 kg (101 lb 8 oz)                    Reviewed and updated as needed this visit by Provider         Review of Systems   ROS COMP: Constitutional, HEENT, cardiovascular, pulmonary, GI, , musculoskeletal, neuro, skin, endocrine and psych systems are negative, except as otherwise noted.      Objective    /62   Pulse 74   Temp 97.2  F (36.2  C) (Oral)   SpO2 97%   There is no height or weight on file to calculate BMI.  Physical Exam   GENERAL: healthy, alert and no distress  EYES: Eyes grossly normal to inspection, PERRL and conjunctivae and sclerae normal  HENT: ear canals and TM's normal, oropharynx erythematous, post nasal drip  NECK: no adenopathy, no asymmetry, masses, or scars and thyroid normal to palpation  RESP: lungs clear to auscultation - no rales, rhonchi or wheezes  CV: regular rate and rhythm, normal S1 S2, no S3 or S4, no murmur, click or rub, no peripheral edema and peripheral pulses strong  ABDOMEN: soft, nontender, no hepatosplenomegaly, no masses and bowel sounds normal  MS: no gross musculoskeletal  defects noted, no edema            Assessment & Plan     1. Acute pharyngitis, unspecified etiology  Symptomatic therapy suggested: push fluids, rest, gargle warm salt water and use acetaminophen, ibuprofen as needed.  - Streptococcus A Rapid Scr w Reflx to PCR      Return if symptoms worsen or fail to improve.    Snow Farnsworth MD  [facility]

## 2020-09-25 ENCOUNTER — VIRTUAL VISIT (OUTPATIENT)
Dept: PEDIATRICS | Facility: CLINIC | Age: 35
End: 2020-09-25
Payer: COMMERCIAL

## 2020-09-25 DIAGNOSIS — J01.90 ACUTE SINUSITIS WITH SYMPTOMS > 10 DAYS: Primary | ICD-10-CM

## 2020-09-25 PROCEDURE — 99213 OFFICE O/P EST LOW 20 MIN: CPT | Mod: 95 | Performed by: INTERNAL MEDICINE

## 2020-09-25 RX ORDER — OXYMETAZOLINE HYDROCHLORIDE 0.05 G/100ML
2 SPRAY NASAL 2 TIMES DAILY
Qty: 1.2 ML | Refills: 0 | Status: SHIPPED | OUTPATIENT
Start: 2020-09-25 | End: 2020-09-28

## 2020-09-25 NOTE — PROGRESS NOTES
"Lalitha Dey is a 35 year old female who is being evaluated via a billable video visit.      The patient has been notified of following:     \"This video visit will be conducted via a call between you and your physician/provider. We have found that certain health care needs can be provided without the need for an in-person physical exam.  This service lets us provide the care you need with a video conversation.  If a prescription is necessary we can send it directly to your pharmacy.  If lab work is needed we can place an order for that and you can then stop by our lab to have the test done at a later time.    Video visits are billed at different rates depending on your insurance coverage.  Please reach out to your insurance provider with any questions.    If during the course of the call the physician/provider feels a video visit is not appropriate, you will not be charged for this service.\"    Patient has given verbal consent for Video visit? Yes  How would you like to obtain your AVS? MyChart  If you are dropped from the video visit, the video invite should be resent to: Text to cell phone: [phone]  Will anyone else be joining your video visit? No      Subjective     Lalitha Dey is a 35 year old female who presents today via video visit for the following health issues:    HPI    Acute Illness  Acute illness concerns: sinus infection, coughing  Onset/Duration: 3-4 days that has been worst, started off last week.  Symptoms:  Fever: no  Chills/Sweats: YES  Headache (location?): YES  Sinus Pressure: YES  Conjunctivitis:  no  Ear Pain: YES: right  Rhinorrhea: YES  Congestion: YES  Sore Throat: no  Cough: YES-non-productive  Wheeze: no  Decreased Appetite: YES  Nausea: no  Vomiting: no  Diarrhea: no  Dysuria/Freq.: no  Dysuria or Hematuria: no  Fatigue/Achiness: YES  Sick/Strep Exposure: no  Therapies tried and outcome: zyrtec, tylenol       Video Start Time: 10:24 AM  Patient has had bad seasonal allergies " in the last few months over the summer.  She takes Zyrtec.  She did not feel that her symptoms was never controlled in terms of allergies, but has been able to manage until 3 to 4 days ago.  Congestion has gotten worse.  Has sinus pain radiating to the right ear.  It hurts to touch her cheeks.  She feels pain in her jaw.  has a bad headache.  Tylenol and ibuprofen do not help.  She works from home, she has a child is almost 2 years of age.  She still nursing once a week.    She denies any fever or shortness of breath.  Has a nonproductive cough intermittently.  She has lost sense of taste and smell.  She has not been able to sleep due to the headache and congestion.    She went to [facility] yesterday and got testing for COVID-19.  She was told that we will take up to 5 to 7 days to get results.  She has not had any sick exposure.  Her  and her son are well.  She works from home, has not been out very much.    Review of Systems   Constitutional, HEENT, cardiovascular, pulmonary, gi and gu systems are negative, except as otherwise noted.      Objective           Vitals:  No vitals were obtained today due to virtual visit.    Physical Exam     GENERAL: Healthy, alert and no distress  EYES: Eyes look slightly puffy.  Sounds very congested.  Patient reports sinus pain when she touches her cheeks, right side is worse than the left.  RESP: No audible wheeze, cough, or visible cyanosis.  No visible retractions or increased work of breathing.    SKIN: Visible skin clear. No significant rash, abnormal pigmentation or lesions.  NEURO: Cranial nerves grossly intact.  Mentation and speech appropriate for age.  PSYCH: Mentation appears normal, affect normal/bright, judgement and insight intact, normal speech and appearance well-groomed.            Assessment & Plan       ICD-10-CM    1. Acute sinusitis with symptoms > 10 days  J01.90 amoxicillin-clavulanate (AUGMENTIN) 875-125 MG tablet     oxymetazoline (AFRIN)  0.05 % nasal spray      --Her symptoms are consistent with sinusitis.  Will treat with Augmentin.  For symptom relief, she can use Afrin nasal spray for up to 3 days.  Augmentin is compatible with breast-feeding.  --While she waits for COVID testing, stay quarantine.  Even if she tested positive for COVID-19, I still think that her symptoms are very consistent with bacterial sinusitis based on her history.  I would recommend she continues to finish the 10-day course of Augmentin.  There is no outpatient treatment options for patients with COVID-19.  Mild cases do not require treatment.  If she has shortness of breath, that will prompt further evaluation to emergency department.  Discussed the above with patient.           No follow-ups on file.    Martine Garcia MD PhD  [facility]      Video-Visit Details    Type of service:  Video Visit    Video End Time:10:43 AM    Originating Location (pt. Location): Home    Distant Location (provider location):  [facility]     Platform used for Video Visit: Makayla

## 2021-01-25 ENCOUNTER — MYC MEDICAL ADVICE (OUTPATIENT)
Dept: ENDOCRINOLOGY | Facility: CLINIC | Age: 36
End: 2021-01-25

## 2021-01-25 DIAGNOSIS — E03.9 HYPOTHYROIDISM, UNSPECIFIED TYPE: ICD-10-CM

## 2021-01-25 RX ORDER — LEVOTHYROXINE SODIUM 75 UG/1
75 TABLET ORAL DAILY
Qty: 90 TABLET | Refills: 1 | Status: SHIPPED | OUTPATIENT
Start: 2021-01-25 | End: 2021-01-27

## 2021-01-26 NOTE — TELEPHONE ENCOUNTER
1/26 Called and spoke to patient. She is currently schedule for virtual visit.     Doris Clancy   Procedure    Ortho/Sports Med/Pod/Ent/Eye  [facility]   [phone]

## 2021-01-27 ENCOUNTER — MYC MEDICAL ADVICE (OUTPATIENT)
Dept: ENDOCRINOLOGY | Facility: CLINIC | Age: 36
End: 2021-01-27

## 2021-01-27 DIAGNOSIS — E03.9 HYPOTHYROIDISM, UNSPECIFIED TYPE: ICD-10-CM

## 2021-01-27 RX ORDER — LEVOTHYROXINE SODIUM 50 UG/1
50 TABLET ORAL DAILY
Qty: 90 TABLET | Refills: 1 | Status: SHIPPED | OUTPATIENT
Start: 2021-01-27 | End: 2021-03-02

## 2021-01-27 NOTE — TELEPHONE ENCOUNTER
Spoke with pt. Taking levothyroxine 50 mcg daily.   Prescription for the same dose refilled.   Blood work to be done prior to visit ordered.

## 2021-03-02 ENCOUNTER — VIRTUAL VISIT (OUTPATIENT)
Dept: ENDOCRINOLOGY | Facility: CLINIC | Age: 36
End: 2021-03-02
Payer: COMMERCIAL

## 2021-03-02 DIAGNOSIS — E06.3 HYPOTHYROIDISM DUE TO HASHIMOTO'S THYROIDITIS: Primary | ICD-10-CM

## 2021-03-02 DIAGNOSIS — E03.9 HYPOTHYROIDISM, UNSPECIFIED TYPE: ICD-10-CM

## 2021-03-02 DIAGNOSIS — R59.9 ENLARGED LYMPH NODE: ICD-10-CM

## 2021-03-02 LAB — TSH SERPL DL<=0.005 MIU/L-ACNC: 3.56 MU/L (ref 0.4–4)

## 2021-03-02 PROCEDURE — 99213 OFFICE O/P EST LOW 20 MIN: CPT | Mod: 95 | Performed by: INTERNAL MEDICINE

## 2021-03-02 PROCEDURE — 84443 ASSAY THYROID STIM HORMONE: CPT | Performed by: INTERNAL MEDICINE

## 2021-03-02 PROCEDURE — 36415 COLL VENOUS BLD VENIPUNCTURE: CPT | Performed by: INTERNAL MEDICINE

## 2021-03-02 RX ORDER — LEVOTHYROXINE SODIUM 50 UG/1
50 TABLET ORAL DAILY
Qty: 90 TABLET | Refills: 3 | Status: SHIPPED | OUTPATIENT
Start: 2021-03-02 | End: 2022-02-17

## 2021-03-02 NOTE — PROGRESS NOTES
Lalitha is a 35 year old who is being evaluated via a billable telephone visit.      What phone number would you like to be contacted at? [phone]  How would you like to obtain your AVS? Michelt

## 2021-03-02 NOTE — LETTER
3/2/2021         RE: Lalitha Dey  [id number] [address] [place] [id number]        Dear Colleague,    Thank you for referring your patient, Lalitha Dey, to the [facility]. Please see a copy of my visit note below.    Lalitha is a 35 year old who is being evaluated via a billable telephone visit.      What phone number would you like to be contacted at? [phone]  How would you like to obtain your AVS? [facility]      Endocrinology Telephone Visit    Chief Complaint: Thyroid Problem     Information obtained from:Patient    Subjective:         HPI: Lalitha Dey is a 35 year old female with history of thyroid ds who is here for routine follow up.    Initial visit about a year ago for a concern regarding weight loss at the time. Delivered her first baby in November 2018 and following delivery she dropped weight at an alarming rate which led to her visit with us.  Today Lalitha tells me that this issue has resolved.  She added that the weight loss at the time was due to breast-feeding.  This is not a concern any longer for her.     She has hypothyroidism secondary to Hashimoto thyroiditis diagnosed at an earlier age.  She has been on a stable dose of levothyroxine 50 mcg daily.  She continues to take this medication.  Recently done TSH is within the range.  Overall from hypothyroidism standpoint she reports that she is feeling well.      At her last visit it was noted that she has level 2 cervical lymph node; which was palpable at the time with normal physical examination features.  Size was less than a centimeter, mobile and nontender.  She reports that she has this persistent lymph node enlargement.     Allergies   Allergen Reactions     Nkda [No Known Drug Allergies]      Seasonal Allergies        Current Outpatient Medications   Medication Sig Dispense Refill     Cetirizine HCl (ZYRTEC PO) Take 10 mg by mouth as needed        levothyroxine (SYNTHROID/LEVOTHROID) 50 MCG tablet  Take 1 tablet (50 mcg) by mouth daily 90 tablet 3     Multiple Vitamin (MULTIVITAMIN ADULT PO)        Prenatal Vit-Fe Fumarate-FA (PRENATAL MULTIVITAMIN PLUS IRON) 27-0.8 MG TABS per tablet Take 1 tablet by mouth daily         Review of Systems     Other review system  is as per HPI above    Past Medical History:   Diagnosis Date     GERD (gastroesophageal reflux disease)      Hypothyroid      Intradermal nevus 03/27/2007    right arm-irritated intradermal nevus       Past Surgical History:   Procedure Laterality Date     NO HISTORY OF SURGERY       SALPINGECTOMY Left 11/28/2017    Ectopic pregnancy       Family History   Problem Relation Age of Onset     Neurologic Disorder Mother         MSAND LUPUS     Thyroid Disease Mother      Depression Father      Thyroid Disease Father      Depression Brother      Thyroid Disease Maternal Grandmother      Cancer Maternal Grandmother      Thyroid Disease Maternal Grandfather      Thyroid Disease Paternal Grandmother      Thyroid Disease Paternal Grandfather      Diabetes No family hx of      Hypertension No family hx of      Cerebrovascular Disease No family hx of      Glaucoma No family hx of      Macular Degeneration No family hx of          Objective:   There were no vitals taken for this visit.  Constitutional: Pleasant no acute cardiopulmonary distress.   Other exam not completed as this was a telephone visit.  In House Labs:       TSH   Date Value Ref Range Status   03/02/2021 3.56 0.40 - 4.00 mU/L Final   02/11/2020 1.54 0.40 - 4.00 mU/L Final   09/09/2019 2.23 0.40 - 4.00 mU/L Final   08/02/2019 5.43 (H) 0.40 - 4.00 mU/L Final   10/09/2018 2.15 0.40 - 4.00 mU/L Final     T4 Free   Date Value Ref Range Status   02/11/2020 1.19 0.76 - 1.46 ng/dL Final   09/09/2019 1.13 0.76 - 1.46 ng/dL Final       Creatinine   Date Value Ref Range Status   09/09/2019 0.78 0.52 - 1.04 mg/dL Final     ENDO THYROID LABS-[facility] Latest Ref Rng & Units 3/2/2021 2/11/2020   TSH 0.40 - 4.00 mU/L  3.56 1.54   T4 FREE 0.76 - 1.46 ng/dL  1.19   THYR PEROXIDASE CORTEZ <35 IU/mL       ENDO THYROID LABS-[facility] Latest Ref Rng & Units 9/9/2019 8/2/2019   TSH 0.40 - 4.00 mU/L 2.23 5.43 (H)   T4 FREE 0.76 - 1.46 ng/dL 1.13    THYR PEROXIDASE CORTEZ <35 IU/mL 484 (H)      Assessment/Treatment Plan:      Hypothyroidism secondary to Hashimoto thyroiditis: She was diagnosed with hypothyroidism when she was a child and she has been taking levothyroxine 50 mcg daily since then.  She currently feels very well on the current dose.  Recently done TSH is within the range.  Continue this medication.  Refill for 1 year completed today.  Going forward I recommend following up with primary care physician regarding this issue.    Palpable cervical lymph node: This was felt on physical exam at her last visit.  Reports that it has persisted over the last 1 year.  Neck ultrasound ordered today for further characterization.    I will contact the patient with the test results.  Return to clinic as needed.  Regarding thyroid condition recommend following up with primary care physician.  She is planning to establish care soon.    History of weight loss-this issue has resolved -see HPI.      Test and/or medications prescribed today:  Orders Placed This Encounter   Procedures     US head neck soft tissue     Patient Instructions   Lalitha,     Please continue levothyroxine 50 mcg daily.  Please take it on empty stomach and wait at least 30 minutes before eating.  Do not take calcium or iron-containing supplements within 4 hours of taking this medication.  Continue to check blood work for thyroid once a year.  Refill for your levothyroxine is sent to pharmacy for 1 year.    Going forward, I recommend following up with your primary care physician regarding this issue.    Regarding concern for neck lymph node enlargement -please reschedule neck ultrasound at your convenience.  Further plan based on results.        Ivelisse Carlson MD  Staff  Endocrinologist    Division of Endocrinology and Diabetes  Total telephone time - 10 minutes.   28 minutes spent on the date of the encounter doing chart review, history, documentation and further activities as noted above.        Again, thank you for allowing me to participate in the care of your patient.        Sincerely,        Ivelisse Carlson MD

## 2021-03-02 NOTE — PROGRESS NOTES
Endocrinology Telephone Visit    Chief Complaint: Thyroid Problem     Information obtained from:Patient    Subjective:         HPI: Lalitha Dey is a 35 year old female with history of thyroid ds who is here for routine follow up.    Initial visit about a year ago for a concern regarding weight loss at the time. Delivered her first baby in November 2018 and following delivery she dropped weight at an alarming rate which led to her visit with us.  Today Lalitha tells me that this issue has resolved.  She added that the weight loss at the time was due to breast-feeding.  This is not a concern any longer for her.     She has hypothyroidism secondary to Hashimoto thyroiditis diagnosed at an earlier age.  She has been on a stable dose of levothyroxine 50 mcg daily.  She continues to take this medication.  Recently done TSH is within the range.  Overall from hypothyroidism standpoint she reports that she is feeling well.      At her last visit it was noted that she has level 2 cervical lymph node; which was palpable at the time with normal physical examination features.  Size was less than a centimeter, mobile and nontender.  She reports that she has this persistent lymph node enlargement.     Allergies   Allergen Reactions     Nkda [No Known Drug Allergies]      Seasonal Allergies        Current Outpatient Medications   Medication Sig Dispense Refill     Cetirizine HCl (ZYRTEC PO) Take 10 mg by mouth as needed        levothyroxine (SYNTHROID/LEVOTHROID) 50 MCG tablet Take 1 tablet (50 mcg) by mouth daily 90 tablet 3     Multiple Vitamin (MULTIVITAMIN ADULT PO)        Prenatal Vit-Fe Fumarate-FA (PRENATAL MULTIVITAMIN PLUS IRON) 27-0.8 MG TABS per tablet Take 1 tablet by mouth daily         Review of Systems     Other review system  is as per HPI above    Past Medical History:   Diagnosis Date     GERD (gastroesophageal reflux disease)      Hypothyroid      Intradermal nevus 03/27/2007    right arm-irritated intradermal  nevus       Past Surgical History:   Procedure Laterality Date     NO HISTORY OF SURGERY       SALPINGECTOMY Left 11/28/2017    Ectopic pregnancy       Family History   Problem Relation Age of Onset     Neurologic Disorder Mother         MSAND LUPUS     Thyroid Disease Mother      Depression Father      Thyroid Disease Father      Depression Brother      Thyroid Disease Maternal Grandmother      Cancer Maternal Grandmother      Thyroid Disease Maternal Grandfather      Thyroid Disease Paternal Grandmother      Thyroid Disease Paternal Grandfather      Diabetes No family hx of      Hypertension No family hx of      Cerebrovascular Disease No family hx of      Glaucoma No family hx of      Macular Degeneration No family hx of          Objective:   There were no vitals taken for this visit.  Constitutional: Pleasant no acute cardiopulmonary distress.   Other exam not completed as this was a telephone visit.  In House Labs:       TSH   Date Value Ref Range Status   03/02/2021 3.56 0.40 - 4.00 mU/L Final   02/11/2020 1.54 0.40 - 4.00 mU/L Final   09/09/2019 2.23 0.40 - 4.00 mU/L Final   08/02/2019 5.43 (H) 0.40 - 4.00 mU/L Final   10/09/2018 2.15 0.40 - 4.00 mU/L Final     T4 Free   Date Value Ref Range Status   02/11/2020 1.19 0.76 - 1.46 ng/dL Final   09/09/2019 1.13 0.76 - 1.46 ng/dL Final       Creatinine   Date Value Ref Range Status   09/09/2019 0.78 0.52 - 1.04 mg/dL Final     ENDO THYROID LABS-[facility] Latest Ref Rng & Units 3/2/2021 2/11/2020   TSH 0.40 - 4.00 mU/L 3.56 1.54   T4 FREE 0.76 - 1.46 ng/dL  1.19   THYR PEROXIDASE CORTEZ <35 IU/mL       ENDO THYROID LABS-[facility] Latest Ref Rng & Units 9/9/2019 8/2/2019   TSH 0.40 - 4.00 mU/L 2.23 5.43 (H)   T4 FREE 0.76 - 1.46 ng/dL 1.13    THYR PEROXIDASE CORTEZ <35 IU/mL 484 (H)      Assessment/Treatment Plan:      Hypothyroidism secondary to Hashimoto thyroiditis: She was diagnosed with hypothyroidism when she was a child and she has been taking levothyroxine 50 mcg daily since  then.  She currently feels very well on the current dose.  Recently done TSH is within the range.  Continue this medication.  Refill for 1 year completed today.  Going forward I recommend following up with primary care physician regarding this issue.    Palpable cervical lymph node: This was felt on physical exam at her last visit.  Reports that it has persisted over the last 1 year.  Neck ultrasound ordered today for further characterization.    I will contact the patient with the test results.  Return to clinic as needed.  Regarding thyroid condition recommend following up with primary care physician.  She is planning to establish care soon.    History of weight loss-this issue has resolved -see HPI.      Test and/or medications prescribed today:  Orders Placed This Encounter   Procedures     US head neck soft tissue     Patient Instructions   Lalitha,     Please continue levothyroxine 50 mcg daily.  Please take it on empty stomach and wait at least 30 minutes before eating.  Do not take calcium or iron-containing supplements within 4 hours of taking this medication.  Continue to check blood work for thyroid once a year.  Refill for your levothyroxine is sent to pharmacy for 1 year.    Going forward, I recommend following up with your primary care physician regarding this issue.    Regarding concern for neck lymph node enlargement -please reschedule neck ultrasound at your convenience.  Further plan based on results.        Ivelisse Carlson MD  Staff Endocrinologist    Division of Endocrinology and Diabetes  Total telephone time - 10 minutes.   28 minutes spent on the date of the encounter doing chart review, history, documentation and further activities as noted above.

## 2021-03-03 NOTE — RESULT ENCOUNTER NOTE
Lalitha,     Your thyroid blood work results are within the normal limits.  Based on these results, please continue your current levothyroxine tablet without any change.    Please let me know if you have any questions.    Ivelisse Carlson MD

## 2021-03-04 ENCOUNTER — ANCILLARY PROCEDURE (OUTPATIENT)
Dept: ULTRASOUND IMAGING | Facility: CLINIC | Age: 36
End: 2021-03-04
Attending: INTERNAL MEDICINE
Payer: COMMERCIAL

## 2021-03-04 DIAGNOSIS — R59.9 ENLARGED LYMPH NODE: ICD-10-CM

## 2021-03-04 PROCEDURE — 76536 US EXAM OF HEAD AND NECK: CPT

## 2021-03-04 NOTE — RESULT ENCOUNTER NOTE
Lalitha,    Please find attached the neck ultrasound reports.  The neck ultrasound showed multiple lymph nodes on both sides of the neck.  Based on the characteristics and size of the nodules; the radiologist has concluded that these nodules are normal.  No thyroid nodule noted.  The thyroid gland is described as heterogeneous and this is a description used when the thyroid gland is not smooth looking.  This is expected finding in people with Hashimoto's thyroiditis.    Please let me know if any questions regarding this ultrasound.    Ivelisse Carlson MD

## 2021-04-03 ENCOUNTER — HEALTH MAINTENANCE LETTER (OUTPATIENT)
Age: 36
End: 2021-04-03

## 2021-06-20 NOTE — TELEPHONE ENCOUNTER
Patient calling states she did a home pregnancy test today came back positive, has scheduled an appt with Kristen Kehr tomorrow for pregnancy confirmation. Patient is wondering if ok to take her medications. Please call to discuss.   English

## 2021-09-04 NOTE — TELEPHONE ENCOUNTER
[facility] Call Center    Phone Message: Lalitha  Phone: [phone]    May a detailed message be left on voicemail: yes    Reason for Call: Requesting Results   Name/type of test: Ultrasound  Date of test: 07/31/2018        Action Taken: Message routed to:  [facility] p [phone]  
See Result tab.    Tatianna Ch    
Previously Declined (within the last year)

## 2021-10-24 DIAGNOSIS — E06.3 HYPOTHYROIDISM DUE TO HASHIMOTO'S THYROIDITIS: ICD-10-CM

## 2021-10-26 RX ORDER — LEVOTHYROXINE SODIUM 50 UG/1
TABLET ORAL
Qty: 90 TABLET | Refills: 3 | OUTPATIENT
Start: 2021-10-26

## 2021-10-28 ENCOUNTER — TELEPHONE (OUTPATIENT)
Dept: ENDOCRINOLOGY | Facility: CLINIC | Age: 36
End: 2021-10-28

## 2021-10-28 NOTE — TELEPHONE ENCOUNTER
levothyroxine (SYNTHROID/LEVOTHROID) 50 MCG tablet: refill available, pharm called confirmed    - Pharm will contact pt when ready for .  Tried to call pt with update- mail box is full.

## 2021-10-28 NOTE — TELEPHONE ENCOUNTER
[facility] Call Center    Phone Message    May a detailed message be left on voicemail: yes     Reason for Call: Medication Refill Request    Has the patient contacted the pharmacy for the refill? Yes   Name of medication being requested: levothyroxine (SYNTHROID/LEVOTHROID) 50 MCG tablet  Provider who prescribed the medication: rob  Pharmacy:[id number] [address]     Date medication is needed: ASAP- pt is almost out.  Pharmacy told pt medication was refused for refill but gave no explanation.  Please call pt.     Action Taken: Message routed to:  [facility] endo    Travel Screening: Not Applicable

## 2022-02-16 ENCOUNTER — OFFICE VISIT (OUTPATIENT)
Dept: FAMILY MEDICINE | Facility: CLINIC | Age: 37
End: 2022-02-16
Payer: COMMERCIAL

## 2022-02-16 VITALS
OXYGEN SATURATION: 99 % | RESPIRATION RATE: 18 BRPM | SYSTOLIC BLOOD PRESSURE: 107 MMHG | DIASTOLIC BLOOD PRESSURE: 68 MMHG | TEMPERATURE: 98.1 F | HEART RATE: 73 BPM | WEIGHT: 122.8 LBS | BODY MASS INDEX: 20.99 KG/M2

## 2022-02-16 DIAGNOSIS — E06.3 HYPOTHYROIDISM DUE TO HASHIMOTO'S THYROIDITIS: Primary | ICD-10-CM

## 2022-02-16 DIAGNOSIS — Z13.1 SCREENING FOR DIABETES MELLITUS (DM): ICD-10-CM

## 2022-02-16 DIAGNOSIS — Z83.2 FAMILY HISTORY OF AUTOIMMUNE DISORDER: ICD-10-CM

## 2022-02-16 DIAGNOSIS — J30.89 SEASONAL ALLERGIC RHINITIS DUE TO OTHER ALLERGIC TRIGGER: ICD-10-CM

## 2022-02-16 DIAGNOSIS — Z11.59 NEED FOR HEPATITIS C SCREENING TEST: ICD-10-CM

## 2022-02-16 DIAGNOSIS — Z13.220 SCREENING FOR HYPERLIPIDEMIA: ICD-10-CM

## 2022-02-16 DIAGNOSIS — Z13.0 SCREENING FOR DEFICIENCY ANEMIA: ICD-10-CM

## 2022-02-16 LAB
CHOLEST SERPL-MCNC: 150 MG/DL
ERYTHROCYTE [DISTWIDTH] IN BLOOD BY AUTOMATED COUNT: 12.8 % (ref 10–15)
FASTING STATUS PATIENT QL REPORTED: YES
FASTING STATUS PATIENT QL REPORTED: YES
GLUCOSE BLD-MCNC: 91 MG/DL (ref 70–99)
HCT VFR BLD AUTO: 41.4 % (ref 35–47)
HDLC SERPL-MCNC: 58 MG/DL
HGB BLD-MCNC: 13 G/DL (ref 11.7–15.7)
LDLC SERPL CALC-MCNC: 79 MG/DL
MCH RBC QN AUTO: 27.5 PG (ref 26.5–33)
MCHC RBC AUTO-ENTMCNC: 31.4 G/DL (ref 31.5–36.5)
MCV RBC AUTO: 88 FL (ref 78–100)
NONHDLC SERPL-MCNC: 92 MG/DL
PLATELET # BLD AUTO: 251 10E3/UL (ref 150–450)
RBC # BLD AUTO: 4.72 10E6/UL (ref 3.8–5.2)
T4 FREE SERPL-MCNC: 1.2 NG/DL (ref 0.76–1.46)
TRIGL SERPL-MCNC: 64 MG/DL
TSH SERPL DL<=0.005 MIU/L-ACNC: 1.98 MU/L (ref 0.4–4)
WBC # BLD AUTO: 5.2 10E3/UL (ref 4–11)

## 2022-02-16 PROCEDURE — 86803 HEPATITIS C AB TEST: CPT | Performed by: FAMILY MEDICINE

## 2022-02-16 PROCEDURE — 84443 ASSAY THYROID STIM HORMONE: CPT | Performed by: FAMILY MEDICINE

## 2022-02-16 PROCEDURE — 99214 OFFICE O/P EST MOD 30 MIN: CPT | Performed by: FAMILY MEDICINE

## 2022-02-16 PROCEDURE — 82947 ASSAY GLUCOSE BLOOD QUANT: CPT | Performed by: FAMILY MEDICINE

## 2022-02-16 PROCEDURE — 36415 COLL VENOUS BLD VENIPUNCTURE: CPT | Performed by: FAMILY MEDICINE

## 2022-02-16 PROCEDURE — 84439 ASSAY OF FREE THYROXINE: CPT | Performed by: FAMILY MEDICINE

## 2022-02-16 PROCEDURE — 86376 MICROSOMAL ANTIBODY EACH: CPT | Performed by: FAMILY MEDICINE

## 2022-02-16 PROCEDURE — 80061 LIPID PANEL: CPT | Performed by: FAMILY MEDICINE

## 2022-02-16 PROCEDURE — 86803 HEPATITIS C AB TEST: CPT | Mod: 59 | Performed by: FAMILY MEDICINE

## 2022-02-16 PROCEDURE — 85027 COMPLETE CBC AUTOMATED: CPT | Performed by: FAMILY MEDICINE

## 2022-02-16 ASSESSMENT — PAIN SCALES - GENERAL: PAINLEVEL: NO PAIN (0)

## 2022-02-16 NOTE — PROGRESS NOTES
Assessment & Plan     Hypothyroidism due to Hashimoto's thyroiditis  TSH   Date Value Ref Range Status   02/16/2022 1.98 0.40 - 4.00 mU/L Final   03/02/2021 3.56 0.40 - 4.00 mU/L Final   02/11/2020 1.54 0.40 - 4.00 mU/L Final   09/09/2019 2.23 0.40 - 4.00 mU/L Final   08/02/2019 5.43 (H) 0.40 - 4.00 mU/L Final   10/09/2018 2.15 0.40 - 4.00 mU/L Final       Patient is currently taking levothyroxine 50 MCG daily, on the same dose for several years now  We will check labs today for recheck  - Thyroid peroxidase antibody; Future  - TSH; Future  - T4, free; Future  - T4, free  - TSH  - Thyroid peroxidase antibody    Seasonal allergic rhinitis due to other allergic trigger  Take Zyrtec as needed    Family history of autoimmune disorder, MS and Lupus in mother  Recommended patient not to worry about this at this time but if she develops any concerning symptoms, she understands to follow-up for further evaluation  Family history updated in epic  Patient is wondering if she is at high risk of getting these  Explained to patient that due to her underlying history of Hashimoto's thyroiditis, she is at high risk of getting other autoimmune disorders including her mother's medical conditions  But I asked her not to stress about it or think about it too much unless if she has any concerning symptoms in which case she will immediately follow up with  the provider  Patient verbalised understanding and is agreeable to the plan.      Need for hepatitis C screening test    - Hepatitis C Screen Reflex to HCV RNA Quant and Genotype; Future  - Hepatitis C antibody; Future  - Hepatitis C antibody  - Hepatitis C Screen Reflex to HCV RNA Quant and Genotype    Screening for deficiency anemia    - CBC with platelets; Future  - CBC with platelets    Screening for diabetes mellitus (DM)    - Glucose; Future  - Glucose    Screening for hyperlipidemia    - Lipid panel reflex to direct LDL Fasting; Future  - Lipid panel reflex to direct LDL  Fasting    Review of the result(s) of each unique test - CBC, glucose, lipids, thyroid         Chart documentation done in part with Dragon Voice recognition Software. Although reviewed after completion, some word and grammatical error may remain.    See Patient Instructions    Return in about 3 months (around 2022), or if symptoms worsen or fail to improve, for Physical Exam.    Kiara Morelos MD  [facility] AVERY Doty is a 36 year old who presents for the following health issues   Patient is new to the provider, is here to establish care  Past medical history significant for hypothyroidism, history of ectopic pregnancy  Patient is  1 para 1-last childbirth 3 years ago,   Patient has family history of lupus and multiple sclerosis in mother and is concerned with her risk of getting those, given her underlying history of Hashimoto's thyroiditis    HPI     Hypothyroidism Follow-up      Since last visit, patient describes the following symptoms: Weight stable, no hair loss, no skin changes, no constipation, no loose stools              Review of Systems   CONSTITUTIONAL: NEGATIVE for fever, chills, change in weight  RESP: NEGATIVE for significant cough or SOB  CV: NEGATIVE for chest pain, palpitations or peripheral edema  GI: NEGATIVE for nausea, abdominal pain, heartburn, or change in bowel habits  MUSCULOSKELETAL: NEGATIVE for significant arthralgias or myalgia  NEURO: NEGATIVE for weakness, dizziness or paresthesias  ENDOCRINE: NEGATIVE for temperature intolerance, skin/hair changes and Hx thyroid disease  HEME/ALLERGY/IMMUNE: NEGATIVE for bleeding problems  PSYCHIATRIC: NEGATIVE for changes in mood or affect      Objective    /68 (BP Location: Right arm, Patient Position: Sitting, Cuff Size: Adult Regular)   Pulse 73   Temp 98.1  F (36.7  C) (Oral)   Resp 18   Wt 55.7 kg (122 lb 12.8 oz)   LMP 02/15/2022   SpO2 99%   BMI 20.99 kg/m    Body mass  index is 20.99 kg/m .  Physical Exam   GENERAL: healthy, alert and no distress  EYES: Eyes grossly normal to inspection  HENT: oropharynx clear and oral mucous membranes moist  NECK: no adenopathy, no asymmetry, masses, or scars and thyroid normal to palpation  RESP: lungs clear to auscultation - no rales, rhonchi or wheezes  CV: regular rate and rhythm, normal S1 S2, no S3 or S4, no murmur, click or rub, no peripheral edema and peripheral pulses strong  MS: no gross musculoskeletal defects noted, no edema  SKIN: no suspicious lesions or rashes  NEURO: Normal strength and tone, mentation intact and speech normal  PSYCH: mentation appears normal, affect normal/bright    Results for orders placed or performed in visit on 02/16/22   T4, free     Status: Normal   Result Value Ref Range    Free T4 1.20 0.76 - 1.46 ng/dL   TSH     Status: Normal   Result Value Ref Range    TSH 1.98 0.40 - 4.00 mU/L   CBC with platelets     Status: Abnormal   Result Value Ref Range    WBC Count 5.2 4.0 - 11.0 10e3/uL    RBC Count 4.72 3.80 - 5.20 10e6/uL    Hemoglobin 13.0 11.7 - 15.7 g/dL    Hematocrit 41.4 35.0 - 47.0 %    MCV 88 78 - 100 fL    MCH 27.5 26.5 - 33.0 pg    MCHC 31.4 (L) 31.5 - 36.5 g/dL    RDW 12.8 10.0 - 15.0 %    Platelet Count 251 150 - 450 10e3/uL   Lipid panel reflex to direct LDL Fasting     Status: None   Result Value Ref Range    Cholesterol 150 <200 mg/dL    Triglycerides 64 <150 mg/dL    Direct Measure HDL 58 >=50 mg/dL    LDL Cholesterol Calculated 79 <=100 mg/dL    Non HDL Cholesterol 92 <130 mg/dL    Patient Fasting > 8hrs? Yes     Narrative    Cholesterol  Desirable:  <200 mg/dL    Triglycerides  Normal:  Less than 150 mg/dL  Borderline High:  150-199 mg/dL  High:  200-499 mg/dL  Very High:  Greater than or equal to 500 mg/dL    Direct Measure HDL  Female:  Greater than or equal to 50 mg/dL   Male:  Greater than or equal to 40 mg/dL    LDL Cholesterol  Desirable:  <100mg/dL  Above Desirable:  100-129 mg/dL    Borderline High:  130-159 mg/dL   High:  160-189 mg/dL   Very High:  >= 190 mg/dL    Non HDL Cholesterol  Desirable:  130 mg/dL  Above Desirable:  130-159 mg/dL  Borderline High:  160-189 mg/dL  High:  190-219 mg/dL  Very High:  Greater than or equal to 220 mg/dL   Glucose     Status: None   Result Value Ref Range    Glucose 91 70 - 99 mg/dL    Patient Fasting > 8hrs? Yes    Hepatitis C antibody     Status: Normal   Result Value Ref Range    Hepatitis C Antibody Nonreactive Nonreactive    Narrative    Assay performance characteristics have not been established for newborns, infants, and children.   Thyroid peroxidase antibody     Status: Abnormal   Result Value Ref Range    Thyroid Peroxidase Antibody 520 (H) <35 IU/mL   Hepatitis C Screen Reflex to HCV RNA Quant and Genotype     Status: Normal   Result Value Ref Range    Hepatitis C Antibody Nonreactive Nonreactive    Narrative    Assay performance characteristics have not been established for newborns, infants, and children.

## 2022-02-17 LAB
HCV AB SERPL QL IA: NONREACTIVE
HCV AB SERPL QL IA: NONREACTIVE
THYROPEROXIDASE AB SERPL-ACNC: 520 IU/ML

## 2022-02-17 RX ORDER — LEVOTHYROXINE SODIUM 50 UG/1
50 TABLET ORAL DAILY
Qty: 90 TABLET | Refills: 3 | Status: SHIPPED | OUTPATIENT
Start: 2022-02-17 | End: 2023-03-07

## 2022-02-17 NOTE — RESULT ENCOUNTER NOTE
Glen Doty,  Your lab test showed normal hemoglobin and blood counts with no concern for anemia, normal blood sugar with no concern for diabetes, excellent fasting cholesterol and negative hepatitis C screening test was done results were reassuring.  Thyroid functions are in normal range, please continue current dose of levothyroxine.  Your thyroid peroxidase antibodies are positive confirming that the condition is from autoimmune Hashimoto's thyroiditis.  This means you will need to be on levothyroxine for the lifetime, with periodic monitoring as discussed during the visit yesterday.   Let me know if you have any questions. Take care.  Kiara Morelos MD

## 2022-03-29 ENCOUNTER — OFFICE VISIT (OUTPATIENT)
Dept: FAMILY MEDICINE | Facility: CLINIC | Age: 37
End: 2022-03-29
Payer: COMMERCIAL

## 2022-03-29 VITALS
DIASTOLIC BLOOD PRESSURE: 67 MMHG | TEMPERATURE: 98.7 F | SYSTOLIC BLOOD PRESSURE: 104 MMHG | OXYGEN SATURATION: 99 % | HEART RATE: 76 BPM | RESPIRATION RATE: 20 BRPM | WEIGHT: 122.7 LBS | BODY MASS INDEX: 20.95 KG/M2 | HEIGHT: 64 IN

## 2022-03-29 DIAGNOSIS — D17.30 LIPOMA OF SKIN AND SUBCUTANEOUS TISSUE: ICD-10-CM

## 2022-03-29 DIAGNOSIS — N92.1 MENOMETRORRHAGIA: ICD-10-CM

## 2022-03-29 DIAGNOSIS — Z00.00 ROUTINE GENERAL MEDICAL EXAMINATION AT A HEALTH CARE FACILITY: Primary | ICD-10-CM

## 2022-03-29 DIAGNOSIS — Z12.4 CERVICAL CANCER SCREENING: ICD-10-CM

## 2022-03-29 DIAGNOSIS — K64.4 EXTERNAL HEMORRHOIDS: ICD-10-CM

## 2022-03-29 DIAGNOSIS — E06.3 HYPOTHYROIDISM DUE TO HASHIMOTO'S THYROIDITIS: ICD-10-CM

## 2022-03-29 PROCEDURE — 99395 PREV VISIT EST AGE 18-39: CPT | Performed by: FAMILY MEDICINE

## 2022-03-29 PROCEDURE — 99214 OFFICE O/P EST MOD 30 MIN: CPT | Mod: 25 | Performed by: FAMILY MEDICINE

## 2022-03-29 ASSESSMENT — ENCOUNTER SYMPTOMS
WEAKNESS: 0
COUGH: 0
FEVER: 0
NERVOUS/ANXIOUS: 0
SHORTNESS OF BREATH: 0
ARTHRALGIAS: 0
HEMATURIA: 0
HEMATOCHEZIA: 0
DIARRHEA: 0
DYSURIA: 0
HEARTBURN: 0
PALPITATIONS: 0
ABDOMINAL PAIN: 0
BREAST MASS: 0
HEADACHES: 0
EYE PAIN: 0
MYALGIAS: 0
CHILLS: 0
PARESTHESIAS: 0
FREQUENCY: 0
DIZZINESS: 0
CONSTIPATION: 0
NAUSEA: 0
SORE THROAT: 0
JOINT SWELLING: 0

## 2022-03-29 ASSESSMENT — PAIN SCALES - GENERAL: PAINLEVEL: NO PAIN (0)

## 2022-03-29 NOTE — PROGRESS NOTES
SUBJECTIVE:   CC: Lalitha Dey is an 36 year old woman who presents for preventive health visit.     Patient is here for annual physical and with other concerns as mentioned below  Menometrorrhagia-for the past year, since that she received her first dose of COVID vaccine in April 2021, patient has noticed having her menstrual cycles spaced out shortly, along with having heavy menstrual bleeding changing a pad every 1-2 hours for the first 2 days of the cycle   Patient is sexually active, uses condoms for contraception  Past medical history significant for hypothyroidism that is in good range with current replacement  LMP-March 15, 2022  Had history of ectopic pregnancy in the past resulting in salpingectomy on the left in 2017  Had a term pregnancy in 2018  External hemorrhoids-patient reported having history of external hemorrhoids since her pregnancy 4 years ago  Has intermittent episodes of itching, discomfort at the perianal area with no concerns for blood or mucus in stool, rectal bleeding  Has family history of colon polyps  Patient is wishing to have it removed surgically  Denies constipation, chronic diarrhea    Patient has been advised of split billing requirements and indicates understanding: Yes  Healthy Habits:     Getting at least 3 servings of Calcium per day:  Yes    Bi-annual eye exam:  Yes    Dental care twice a year:  Yes    Sleep apnea or symptoms of sleep apnea:  None    Diet:  Regular (no restrictions)    Frequency of exercise:  1 day/week    Duration of exercise:  Other    Taking medications regularly:  Yes    Medication side effects:  Not applicable    PHQ-2 Total Score: 0    Additional concerns today:  Yes          Hypothyroidism Follow-up      Since last visit, patient describes the following symptoms: Weight stable, no hair loss, no skin changes, no constipation, no loose stools      Today's PHQ-2 Score:   PHQ-2 ( 1999 Pfizer) 3/29/2022   Q1: Little interest or pleasure in doing  things 0   Q2: Feeling down, depressed or hopeless 0   PHQ-2 Score 0   PHQ-2 Total Score (12-17 Years)- Positive if 3 or more points; Administer PHQ-A if positive -   Q1: Little interest or pleasure in doing things Not at all   Q2: Feeling down, depressed or hopeless Not at all   PHQ-2 Score 0       Abuse: Current or Past (Physical, Sexual or Emotional) - No  Do you feel safe in your environment? Yes    Have you ever done Advance Care Planning? (For example, a Health Directive, POLST, or a discussion with a medical provider or your loved ones about your wishes): Yes, patient states has an Advance Care Planning document and will bring a copy to the clinic.    Social History     Tobacco Use     Smoking status: Former Smoker     Quit date: 2008     Years since quittin.6     Smokeless tobacco: Never Used     Tobacco comment: Lives in smoke free household   Substance Use Topics     Alcohol use: No     If you drink alcohol do you typically have >3 drinks per day or >7 drinks per week? No    Alcohol Use 3/29/2022   Prescreen: >3 drinks/day or >7 drinks/week? Not Applicable   Prescreen: >3 drinks/day or >7 drinks/week? -   No flowsheet data found.    Reviewed orders with patient.  Reviewed health maintenance and updated orders accordingly - Yes  Lab work is in process  Labs reviewed in EPIC  BP Readings from Last 3 Encounters:   22 104/67   22 107/68   20 112/62    Wt Readings from Last 3 Encounters:   22 55.7 kg (122 lb 11.2 oz)   22 55.7 kg (122 lb 12.8 oz)   20 47.9 kg (105 lb 9.6 oz)                  Patient Active Problem List   Diagnosis     CARDIOVASCULAR SCREENING; LDL GOAL LESS THAN 160     Seasonal allergic rhinitis     Acne     Conjunctivitis, allergic     Dry eyes     Blepharitis     Hypothyroidism     Menometrorrhagia     S/P ectopic pregnancy     Gastroesophageal reflux disease without esophagitis     S/P  section     Encounter for initial prescription of  contraceptive pills      delivery delivered     Family history of autoimmune disorder, MS and Lupus in mother     External hemorrhoids     Lipoma of skin and subcutaneous tissue     Past Surgical History:   Procedure Laterality Date     MOLE REMOVAL      Several     NO HISTORY OF SURGERY       SALPINGECTOMY Left 2017    Ectopic pregnancy       Social History     Tobacco Use     Smoking status: Former Smoker     Quit date: 2008     Years since quittin.6     Smokeless tobacco: Never Used     Tobacco comment: Lives in smoke free household   Substance Use Topics     Alcohol use: No     Family History   Problem Relation Age of Onset     Neurologic Disorder Mother         MSAND LUPUS     Thyroid Disease Mother      Hashimoto's thyroiditis Mother      Lupus Mother      Multiple Sclerosis Mother      Depression Father      Thyroid Disease Father      Depression Brother      Thyroid Disease Maternal Grandmother      Cancer Maternal Grandmother      Thyroid Disease Maternal Grandfather      Thyroid Disease Paternal Grandmother      Thyroid Disease Paternal Grandfather      Diabetes No family hx of      Hypertension No family hx of      Cerebrovascular Disease No family hx of      Glaucoma No family hx of      Macular Degeneration No family hx of          Current Outpatient Medications   Medication Sig Dispense Refill     Cetirizine HCl (ZYRTEC PO) Take 10 mg by mouth as needed        levothyroxine (SYNTHROID/LEVOTHROID) 50 MCG tablet Take 1 tablet (50 mcg) by mouth daily 90 tablet 3     Multiple Vitamin (MULTIVITAMIN ADULT PO)        Allergies   Allergen Reactions     Nkda [No Known Drug Allergies]      Seasonal Allergies      Recent Labs   Lab Test 22  0906 21  0719 20  1218 19  1427   LDL 79  --   --   --    HDL 58  --   --   --    TRIG 64  --   --   --    ALT  --   --   --  18   CR  --   --   --  0.78   GFRESTIMATED  --   --   --  >90   GFRESTBLACK  --   --   --  >90    POTASSIUM  --   --   --  3.8   TSH 1.98 3.56   < > 2.23    < > = values in this interval not displayed.        Breast Cancer Screening:  Any new diagnosis of family breast, ovarian, or bowel cancer? No    FHS-7: No flowsheet data found.  click delete button to remove this line now  Patient under 40 years of age: Routine Mammogram Screening not recommended.   Pertinent mammograms are reviewed under the imaging tab.    History of abnormal Pap smear: NO - age 30-65 PAP every 5 years with negative HPV co-testing recommended  PAP / HPV Latest Ref Rng & Units 2019 2016 10/21/2013   PAP (Historical) - NIL NIL NIL   HPV16 NEG:Negative Negative - -   HPV18 NEG:Negative Negative - -   HRHPV NEG:Negative Negative - -     Reviewed and updated as needed this visit by clinical staff   Tobacco  Allergies  Meds   Med Hx  Surg Hx  Fam Hx  Soc Hx        Reviewed and updated as needed this visit by Provider                   Past Medical History:   Diagnosis Date     GERD (gastroesophageal reflux disease)      Hypothyroid      Intradermal nevus 2007    right arm-irritated intradermal nevus      Past Surgical History:   Procedure Laterality Date     MOLE REMOVAL      Several     NO HISTORY OF SURGERY       SALPINGECTOMY Left 2017    Ectopic pregnancy     OB History    Para Term  AB Living   2 1 1 0 0 1   SAB IAB Ectopic Multiple Live Births   0 0 0 0 1      # Outcome Date GA Lbr Chaz/2nd Weight Sex Delivery Anes PTL Lv   2 Term 18 40w5d  3.742 kg (8 lb 4 oz)  CS-LTranv EPI N LORIN      Complications: Failure to Progress in First Stage      Apgar1: 8  Apgar5: 8   1                 Review of Systems   Constitutional: Negative for chills and fever.   HENT: Negative for congestion, ear pain, hearing loss and sore throat.    Eyes: Negative for pain and visual disturbance.   Respiratory: Negative for cough and shortness of breath.    Cardiovascular: Negative for chest pain, palpitations and  "peripheral edema.   Gastrointestinal: Negative for abdominal pain, constipation, diarrhea, heartburn, hematochezia and nausea.   Breasts:  Positive for tenderness. Negative for breast mass and discharge.   Genitourinary: Positive for vaginal discharge. Negative for dysuria, frequency, genital sores, hematuria, pelvic pain, urgency and vaginal bleeding.   Musculoskeletal: Negative for arthralgias, joint swelling and myalgias.   Skin: Negative for rash.   Neurological: Negative for dizziness, weakness, headaches and paresthesias.   Psychiatric/Behavioral: Negative for mood changes. The patient is not nervous/anxious.      CONSTITUTIONAL: NEGATIVE for fever, chills, change in weight  INTEGUMENTARY/SKIN: ,Swelling in the right lower region had history of lipoma removed from the right upper back years ago  EYES: NEGATIVE for vision changes or irritation  ENT: NEGATIVE for ear, mouth and throat problems  RESP: NEGATIVE for significant cough or SOB  BREAST: NEGATIVE for masses, tenderness or discharge  Intermittent episodes of breast pain, had benign left breast ultrasound and diagnostic mammogram on both side in 2019  CV: NEGATIVE for chest pain, palpitations or peripheral edema  GI: as above   female: as above  , Intermittent episodes of mucoid vaginal discharge  MUSCULOSKELETAL: NEGATIVE for significant arthralgias or myalgia  NEURO: NEGATIVE for weakness, dizziness or paresthesias  ENDOCRINE: NEGATIVE for temperature intolerance, skin/hair changes  HEME/ALLERGY/IMMUNE: NEGATIVE for bleeding problems  PSYCHIATRIC: NEGATIVE for changes in mood or affect     OBJECTIVE:   /67 (BP Location: Right arm, Patient Position: Sitting, Cuff Size: Adult Regular)   Pulse 76   Temp 98.7  F (37.1  C) (Temporal)   Resp 20   Ht 1.619 m (5' 3.75\")   Wt 55.7 kg (122 lb 11.2 oz)   LMP 03/15/2022 (Exact Date)   SpO2 99%   BMI 21.23 kg/m    Physical Exam  GENERAL: healthy, alert and no distress  EYES: Eyes grossly normal to " inspection, PERRL and conjunctivae and sclerae normal  HENT: ear canals and TM's normal, nose and mouth without ulcers or lesions  NECK: no adenopathy, no asymmetry, masses, or scars and thyroid normal to palpation  RESP: lungs clear to auscultation - no rales, rhonchi or wheezes  BREAST: normal without masses, tenderness or nipple discharge and no palpable axillary masses or adenopathy  CV: regular rate and rhythm, normal S1 S2, no S3 or S4, no murmur, click or rub, no peripheral edema and peripheral pulses strong  ABDOMEN: soft, nontender, no hepatosplenomegaly, no masses and bowel sounds normal   (female): normal female external genitalia, normal urethral meatus, vaginal mucosa pink, moist, well rugated, and normal cervix/adnexa/uterus without masses or discharge  RECTAL: External hemorrhoid-nontender, 2-3, less than centimeter size,  MS: no gross musculoskeletal defects noted, no edema  SKIN: About 1 cm, nontender, mobile firm subcutaneous swelling in the right lower lumbar region  NEURO: Normal strength and tone, mentation intact and speech normal  PSYCH: mentation appears normal, affect normal/bright    Diagnostic Test Results:  Labs reviewed in Epic    ASSESSMENT/PLAN:   (Z00.00) Routine general medical examination at a health care facility  (primary encounter diagnosis)  Comment:   Plan: Discussed on regular exercises, healthy eating, self breast exams monthly and routine dental checks.  Reassured patient of normal breast exam, and vaginal exam    (N92.1) Menometrorrhagia  Comment:   Lab Results   Component Value Date    WBC 5.2 02/16/2022    WBC 5.1 09/09/2019     Lab Results   Component Value Date    RBC 4.72 02/16/2022    RBC 4.94 09/09/2019     Lab Results   Component Value Date    HGB 13.0 02/16/2022    HGB 13.4 09/09/2019     Lab Results   Component Value Date    HCT 41.4 02/16/2022    HCT 42.3 09/09/2019     No components found for: MCT  Lab Results   Component Value Date    MCV 88 02/16/2022    MCV  86 09/09/2019     Lab Results   Component Value Date    MCH 27.5 02/16/2022    MCH 27.1 09/09/2019     Lab Results   Component Value Date    MCHC 31.4 02/16/2022    MCHC 31.7 09/09/2019     Lab Results   Component Value Date    RDW 12.8 02/16/2022    RDW 11.9 09/09/2019     Lab Results   Component Value Date     02/16/2022     09/09/2019     Lab Results   Component Value Date    TSH 1.98 02/16/2022    TSH 3.56 03/02/2021       Plan: US Pelvic Complete with Transvaginal        ddx-dysfunctional uterine bleeding/hypothyroidism/?  Uterine fibroids  Reviewed normal CBC and thyroid labs from last month  Recommended to proceed with pelvic scan for further evaluation  Will f/u on results and call with recommendations.  Patient verbalised understanding and is agreeable to the plan.      (K64.4) External hemorrhoids  Comment:   Plan: Colorectal Surgery Referral        Recommended to avoid constipation, diarrhea, considered conservative management including topical Anusol cream  Patient has tried a sitz bath with no relief of symptoms  She is wishing to have it surgically excised and removed  Referred to colorectal surgery for further evaluation    (D17.30) Lipoma of skin and subcutaneous tissue  Comment:   Plan: Reassured patient, monitor for now, consider surgical consult for worsening symptoms  Patient verbalised understanding and is agreeable to the plan.      (E03.8,  E06.3) Hypothyroidism due to Hashimoto's thyroiditis  Comment:   Plan:   TSH   Date Value Ref Range Status   02/16/2022 1.98 0.40 - 4.00 mU/L Final   03/02/2021 3.56 0.40 - 4.00 mU/L Final   02/11/2020 1.54 0.40 - 4.00 mU/L Final   09/09/2019 2.23 0.40 - 4.00 mU/L Final   08/02/2019 5.43 (H) 0.40 - 4.00 mU/L Final   10/09/2018 2.15 0.40 - 4.00 mU/L Final     Normal thyroid labs, continue current dose of levothyroxine 50 MCG daily    (Z12.4) Cervical cancer screening  Comment:   Plan: Patient is not due for Pap until  "2024        COUNSELING:  Reviewed preventive health counseling, as reflected in patient instructions  Special attention given to:        Regular exercise       Healthy diet/nutrition       Vision screening       Contraception       Family planning       Folic Acid    Estimated body mass index is 21.23 kg/m  as calculated from the following:    Height as of this encounter: 1.619 m (5' 3.75\").    Weight as of this encounter: 55.7 kg (122 lb 11.2 oz).        She reports that she quit smoking about 13 years ago. She has never used smokeless tobacco.      Counseling Resources:  ATP IV Guidelines  Pooled Cohorts Equation Calculator  Breast Cancer Risk Calculator  BRCA-Related Cancer Risk Assessment: FHS-7 Tool  FRAX Risk Assessment  ICSI Preventive Guidelines  Dietary Guidelines for Americans, 2010  USDA's MyPlate  ASA Prophylaxis  Lung CA Screening    Kiara Morelos MD  [facility]  Chart documentation done in part with Dragon Voice recognition Software. Although reviewed after completion, some word and grammatical error may remain.    "

## 2022-03-29 NOTE — PATIENT INSTRUCTIONS
Call  to schedule for pelvic scan  Call   [facility]   [address] [address]   Phone: [phone]   Fax: [phone]       Preventive Health Recommendations  Female Ages 26 - 39  Yearly exam:   See your health care provider every year in order to    Review health changes.     Discuss preventive care.      Review your medicines if you your doctor has prescribed any.    Until age 30: Get a Pap test every three years (more often if you have had an abnormal result).    After age 30: Talk to your doctor about whether you should have a Pap test every 3 years or have a Pap test with HPV screening every 5 years.   You do not need a Pap test if your uterus was removed (hysterectomy) and you have not had cancer.  You should be tested each year for STDs (sexually transmitted diseases), if you're at risk.   Talk to your provider about how often to have your cholesterol checked.  If you are at risk for diabetes, you should have a diabetes test (fasting glucose).  Shots: Get a flu shot each year. Get a tetanus shot every 10 years.   Nutrition:     Eat at least 5 servings of fruits and vegetables each day.    Eat whole-grain bread, whole-wheat pasta and brown rice instead of white grains and rice.    Get adequate Calcium and Vitamin D.     Lifestyle    Exercise at least 150 minutes a week (30 minutes a day, 5 days of the week). This will help you control your weight and prevent disease.    Limit alcohol to one drink per day.    No smoking.     Wear sunscreen to prevent skin cancer.    See your dentist every six months for an exam and cleaning.

## 2022-03-30 ENCOUNTER — ANCILLARY PROCEDURE (OUTPATIENT)
Dept: ULTRASOUND IMAGING | Facility: CLINIC | Age: 37
End: 2022-03-30
Attending: FAMILY MEDICINE
Payer: COMMERCIAL

## 2022-03-30 DIAGNOSIS — N92.1 MENOMETRORRHAGIA: ICD-10-CM

## 2022-03-30 PROCEDURE — 76856 US EXAM PELVIC COMPLETE: CPT

## 2022-03-30 PROCEDURE — 76830 TRANSVAGINAL US NON-OB: CPT

## 2022-03-30 NOTE — RESULT ENCOUNTER NOTE
Hi Lalitha,  Your pelvic scan is normal showing uterus with no concern for fibroids.  Your uterine lining is appearing normal  The ovaries are normal.  These are reassuring  If symptoms get worse, we will consider consulting gynecologist to evaluate further.   Let me know if you have any questions. Take care.  Kiara Morelos MD

## 2022-03-30 NOTE — TELEPHONE ENCOUNTER
Diagnosis, Referred by & from: Hemorrhoids   Appt date: 2022   NOTES STATUS DETAILS   OFFICE NOTE from referring provider [facility]:  3/29/22, 22 - PCC OV with Dr. Morelos   OFFICE NOTE from other specialist [facility]:  19 - PCC OV with Dr. Barreto   DISCHARGE SUMMARY from hospital N/A    DISCHARGE REPORT from the ER [facility] - 17 - ED OV with Dr. Hernandez   OPERATIVE REPORT [facility]:  17 - OP Note for LAPAROSCOPY, LEFT SALPINGECTOMY with Dr. Ch   MEDICATION LIST Internal    LABS N/A    DIAGNOSTIC PROCEDURES N/A    IMAGING (DISC & REPORT)      XRAY [facility]:  19  - XR Abdomen   ULTRASOUND  (ENDOANAL/ENDORECTAL) [facility]:  3/30/22 - US Pelvic  18 - US Pelvic  17 - US Pelvic

## 2022-05-16 ENCOUNTER — VIRTUAL VISIT (OUTPATIENT)
Dept: FAMILY MEDICINE | Facility: CLINIC | Age: 37
End: 2022-05-16
Payer: COMMERCIAL

## 2022-05-16 DIAGNOSIS — F41.9 ANXIETY: Primary | ICD-10-CM

## 2022-05-16 PROBLEM — F41.8 ANTICIPATORY ANXIETY: Status: ACTIVE | Noted: 2022-05-16

## 2022-05-16 PROCEDURE — 99214 OFFICE O/P EST MOD 30 MIN: CPT | Mod: 95 | Performed by: FAMILY MEDICINE

## 2022-05-16 RX ORDER — PROPRANOLOL HYDROCHLORIDE 10 MG/1
TABLET ORAL
Qty: 30 TABLET | Refills: 1 | Status: SHIPPED | OUTPATIENT
Start: 2022-05-16 | End: 2022-09-12

## 2022-05-16 ASSESSMENT — ANXIETY QUESTIONNAIRES
1. FEELING NERVOUS, ANXIOUS, OR ON EDGE: SEVERAL DAYS
5. BEING SO RESTLESS THAT IT IS HARD TO SIT STILL: NOT AT ALL
4. TROUBLE RELAXING: NOT AT ALL
8. IF YOU CHECKED OFF ANY PROBLEMS, HOW DIFFICULT HAVE THESE MADE IT FOR YOU TO DO YOUR WORK, TAKE CARE OF THINGS AT HOME, OR GET ALONG WITH OTHER PEOPLE?: NOT DIFFICULT AT ALL
3. WORRYING TOO MUCH ABOUT DIFFERENT THINGS: SEVERAL DAYS
GAD7 TOTAL SCORE: 5
7. FEELING AFRAID AS IF SOMETHING AWFUL MIGHT HAPPEN: SEVERAL DAYS
2. NOT BEING ABLE TO STOP OR CONTROL WORRYING: SEVERAL DAYS
6. BECOMING EASILY ANNOYED OR IRRITABLE: SEVERAL DAYS
GAD7 TOTAL SCORE: 5
7. FEELING AFRAID AS IF SOMETHING AWFUL MIGHT HAPPEN: SEVERAL DAYS
GAD7 TOTAL SCORE: 5

## 2022-05-16 ASSESSMENT — ENCOUNTER SYMPTOMS: NERVOUS/ANXIOUS: 1

## 2022-05-16 NOTE — PROGRESS NOTES
Lalitha is a 36 year old who is being evaluated via a billable video visit.      How would you like to obtain your AVS? MyChart  If the video visit is dropped, the invitation should be resent by: Text to cell phone: [phone]  Will anyone else be joining your video visit? No    Video Start Time: 9:25 AM    Assessment & Plan     Anxiety  Will start on counseling  Prescription sent for anxiety milligrams to take as needed for anticipated anxiety  Dosing and potential medication side effects discussed.  Patient will send us a message after trying for update on therapeutic response on medication  Continue relaxation techniques  Patient verbalised understanding and is agreeable to the plan.    - propranolol (INDERAL) 10 MG tablet; Take 1 tablet as needed, 30 to 45 minutes before anxiety provoking situation  - Adult Mental Health  Referral; Future    Review of the result(s) of each unique test - Thyroid labs from February thousand 21  [id number]}     Chart documentation done in part with Dragon Voice recognition Software. Although reviewed after completion, some word and grammatical error may remain.    See Patient Instructions    Return in about 3 months (around 8/16/2022), or if symptoms worsen or fail to improve.    Kiara Morelos MD  [facility]   Lalitha is a 36 year old who presents for the following health issues  Patient is here for a video visit instead of in person visit due to the current COVID-19 pandemic.  Patient with past medical history significant for hypothyroidism is here with concerns of having anxiety episodes, mostly before any major work-related meetings, progressively worsening in the past 2 years at the start of the pandemic and working remotely.  Patient feels the panic attacks including perspiration, palpitations before any performances or meetings affecting her quality of work  Patient denies feeling anxious otherwise  Denies depression  Has history  of anxiety for many years, she has been managing it herself with relaxation techniques.  With a change in her life including having a child, pandemic and work made the anxiety worse from before  Patient was trying to see a counselor in the past 2 years but was preferring to have it in person.  She was not successful to find one  in person yet, again due to the pandemic.  Patient does not have a history of use of tobacco, alcohol, recreational drugs  Denies previous medication treatment anxiety  Denies problems of sleep    Anxiety    History of Present Illness       Mental Health Follow-up:  Patient presents to follow-up on Anxiety.    Patient's anxiety since last visit has been:  No change  The patient is not having other symptoms associated with anxiety.  Any significant life events: No  Patient is not feeling anxious or having panic attacks.  Patient has no concerns about alcohol or drug use.         Today's BONG-7 Score: 5    She eats 2-3 servings of fruits and vegetables daily.She consumes 0 sweetened beverage(s) daily.She exercises with enough effort to increase her heart rate 9 or less minutes per day.  She exercises with enough effort to increase her heart rate 3 or less days per week.   She is taking medications regularly.           Review of Systems   Psychiatric/Behavioral: The patient is nervous/anxious.       CONSTITUTIONAL: NEGATIVE for fever, chills, change in weight  RESP: NEGATIVE for significant cough or SOB  CV: NEGATIVE for chest pain, palpitations or peripheral edema  ENDOCRINE: NEGATIVE for temperature intolerance, skin/hair changes and history of hypothyroidism  PSYCHIATRIC: History of anxiety      Objective    Vitals - Patient Reported  Pain Score: No Pain (0)        Physical Exam   GENERAL: Healthy, alert and no distress  EYES: Eyes grossly normal to inspection  RESP: No audible wheeze, cough, or visible cyanosis.  No visible retractions or increased work of breathing.    SKIN: Visible skin  clear. No significant rash, abnormal pigmentation or lesions.  NEURO: Cranial nerves grossly intact.  Mentation and speech appropriate for age.  PSYCH: Mentation appears normal, affect normal/bright, judgement and insight intact, normal speech and appearance well-groomed.                Video-Visit Details    Type of service:  Video Visit    Video End Time:9:40 AM    Originating Location (pt. Location): Home    Distant Location (provider location):  [facility]     Platform used for Video Visit: [employer]

## 2022-05-17 ASSESSMENT — ANXIETY QUESTIONNAIRES: GAD7 TOTAL SCORE: 5

## 2022-07-06 ENCOUNTER — PRE VISIT (OUTPATIENT)
Dept: SURGERY | Facility: CLINIC | Age: 37
End: 2022-07-06

## 2022-07-21 ENCOUNTER — E-VISIT (OUTPATIENT)
Dept: FAMILY MEDICINE | Facility: CLINIC | Age: 37
End: 2022-07-21
Payer: COMMERCIAL

## 2022-07-21 DIAGNOSIS — R21 RASH: Primary | ICD-10-CM

## 2022-07-21 PROCEDURE — 99207 PR NON-BILLABLE SERV PER CHARTING: CPT | Performed by: NURSE PRACTITIONER

## 2022-08-26 ENCOUNTER — OFFICE VISIT (OUTPATIENT)
Dept: FAMILY MEDICINE | Facility: CLINIC | Age: 37
End: 2022-08-26
Payer: COMMERCIAL

## 2022-08-26 VITALS
WEIGHT: 124.4 LBS | RESPIRATION RATE: 18 BRPM | DIASTOLIC BLOOD PRESSURE: 66 MMHG | HEIGHT: 65 IN | SYSTOLIC BLOOD PRESSURE: 105 MMHG | OXYGEN SATURATION: 98 % | TEMPERATURE: 99.1 F | HEART RATE: 69 BPM | BODY MASS INDEX: 20.73 KG/M2

## 2022-08-26 DIAGNOSIS — N64.4 BREAST PAIN, LEFT: Primary | ICD-10-CM

## 2022-08-26 DIAGNOSIS — R23.4 BREAST SKIN CHANGES: ICD-10-CM

## 2022-08-26 PROCEDURE — 99213 OFFICE O/P EST LOW 20 MIN: CPT | Performed by: PHYSICIAN ASSISTANT

## 2022-08-26 RX ORDER — TRETINOIN 0.5 MG/G
CREAM TOPICAL
COMMUNITY
Start: 2022-06-30 | End: 2023-03-07

## 2022-08-26 ASSESSMENT — PAIN SCALES - GENERAL: PAINLEVEL: NO PAIN (0)

## 2022-08-26 NOTE — PROGRESS NOTES
Assessment & Plan     Breast pain, left  Low suspicion for malignancy- will obtain diagnostic mammogram and ultrasound to exclude  - MA Diagnostic Digital Left  - US Breast Left Limited 1-3 Quadrants    Breast skin changes  As above - noted of left breast  - MA Diagnostic Digital Left  - US Breast Left Limited 1-3 Quadrants      Ordering of each unique test  19 minutes spent on the date of the encounter doing chart review, history and exam, documentation and further activities per the note       Patient Instructions   Schedule diagnostic mammogram and ultrasound at [facility] ([phone]) formerly called [facility].   Keep upcoming appointment with Dr. Morelos  Return urgently if any change in symptoms fever,redness, drainage or other change in symptoms       Return in about 17 days (around 9/12/2022), or if symptoms worsen or fail to improve, for in person.    MANISH Melo [facility] AVERY Doty is a 37 year old, presenting for the following health issues:  Breast Concern      History of Present Illness       Reason for visit:  Breast concerns  Symptom onset:  More than a month  Symptoms include:  Soreness, change in texture, etc.  Symptom intensity:  Mild  Symptom progression:  Staying the same  Had these symptoms before:  Yes  Has tried/received treatment for these symptoms:  No  What makes it worse:  NA  What makes it better:  NA    She eats 2-3 servings of fruits and vegetables daily.She consumes 0 sweetened beverage(s) daily.She exercises with enough effort to increase her heart rate 10 to 19 minutes per day.  She exercises with enough effort to increase her heart rate 3 or less days per week.   She is taking medications regularly.     Left breast skin changes and pain for more than a month  Had an appointment with her doc but was was out out and rescheduled for follow up with with Dr. Morelos in 3 weeks.   Texture changes with  "menses  August 3 last period.    Soreness and does a lot breast exams and notes some texture changes.   Left breast is sore.  Pain Fluctuates. 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      Objective    /66   Pulse 69   Temp 99.1  F (37.3  C) (Temporal)   Resp 18   Ht 1.638 m (5' 4.5\")   Wt 56.4 kg (124 lb 6.4 oz)   LMP 08/03/2022 (Exact Date)   SpO2 98%   BMI 21.02 kg/m    Body mass index is 21.02 kg/m .  Physical Exam   GENERAL: healthy, alert and no distress  NECK: no adenopathy, no asymmetry, masses, or scars and thyroid normal to palpation  RESP: lungs clear to auscultation - no rales, rhonchi or wheezes  BREAST: no nipple discharge bilaterally, no erythema or warmth or fluctuance, no axillary adenopathy  Left breast tender along medial edge of entire breast- I do not appreciate any discrete masses   CV: regular rate and rhythm, normal S1 S2, no S3 or S4, no murmur, click or rub, no peripheral edema and peripheral pulses strong  MS: no gross musculoskeletal defects noted, no edema- wearing a boot due to foot fracture                   .  ..  "

## 2022-08-26 NOTE — PATIENT INSTRUCTIONS
Schedule diagnostic mammogram and ultrasound at [facility] ([phone]) formerly called [facility].   Keep upcoming appointment with Dr. Morelos  Return urgently if any change in symptoms fever,redness, drainage or other change in symptoms

## 2022-09-01 ENCOUNTER — ANCILLARY PROCEDURE (OUTPATIENT)
Dept: ULTRASOUND IMAGING | Facility: CLINIC | Age: 37
End: 2022-09-01
Attending: PHYSICIAN ASSISTANT
Payer: COMMERCIAL

## 2022-09-01 DIAGNOSIS — N64.4 BREAST PAIN, LEFT: ICD-10-CM

## 2022-09-01 DIAGNOSIS — R23.4 BREAST SKIN CHANGES: ICD-10-CM

## 2022-09-01 PROCEDURE — 76642 ULTRASOUND BREAST LIMITED: CPT | Mod: LT | Performed by: STUDENT IN AN ORGANIZED HEALTH CARE EDUCATION/TRAINING PROGRAM

## 2022-09-01 NOTE — RESULT ENCOUNTER NOTE
Dear Lalitha  I believe the radiologist reviewed findings with you.   Everything looks good.  Please call or MyChart my office with any questions or concerns.   Malgorzata Coned, PAC

## 2022-09-12 ENCOUNTER — OFFICE VISIT (OUTPATIENT)
Dept: FAMILY MEDICINE | Facility: CLINIC | Age: 37
End: 2022-09-12
Payer: COMMERCIAL

## 2022-09-12 VITALS
BODY MASS INDEX: 20.96 KG/M2 | DIASTOLIC BLOOD PRESSURE: 70 MMHG | RESPIRATION RATE: 20 BRPM | SYSTOLIC BLOOD PRESSURE: 110 MMHG | TEMPERATURE: 98.6 F | OXYGEN SATURATION: 100 % | HEART RATE: 83 BPM | WEIGHT: 124 LBS

## 2022-09-12 DIAGNOSIS — N64.4 BREAST PAIN, LEFT: ICD-10-CM

## 2022-09-12 DIAGNOSIS — K64.4 EXTERNAL HEMORRHOIDS: Primary | ICD-10-CM

## 2022-09-12 PROCEDURE — 99213 OFFICE O/P EST LOW 20 MIN: CPT | Performed by: FAMILY MEDICINE

## 2022-09-12 ASSESSMENT — PAIN SCALES - GENERAL: PAINLEVEL: NO PAIN (0)

## 2022-09-12 NOTE — PROGRESS NOTES
Assessment & Plan     External hemorrhoids  Patient is preferring to see colorectal surgeon close to home and not having to drive all the way to the [place]  Referral placed for  [facility]  Avoid constipation, sitz bath as needed  - Adult Colorectal Surgery  Referral; Future    Breast pain, left  Reviewed left breast ultrasound and diagnostic mammogram showing dense breast but otherwise with no lumps or other concerning findings  Reassured patient  Continue to monitor, follow-up as needed      Review of the result(s) of each unique test - Breast ultrasound and diagnostic mammogram         Chart documentation done in part with Dragon Voice recognition Software. Although reviewed after completion, some word and grammatical error may remain.    See Patient Instructions    Return in about 6 months (around 3/12/2023), or if symptoms worsen or fail to improve, for Physical Exam, fasting labs.    Kiara Morelos MD  [facility]    Yo Doty is a 37 year old, presenting for the following health issues:  Breast Problem      History of Present Illness       Reason for visit:  Follow-up from appt (regarding prior concerns)    She eats 2-3 servings of fruits and vegetables daily.She consumes 0 sweetened beverage(s) daily.She exercises with enough effort to increase her heart rate 9 or less minutes per day.  She exercises with enough effort to increase her heart rate 3 or less days per week.   She is taking medications regularly.  Patient with past medical history significant for hypothyroidism is here for follow-up on her recent office visits to evaluate ongoing left breast pain   Patient was having intermittent pain on the left breast for more than a month, that was not related to her menstrual period  Patient lost her baby for over 2 years but has not breast-fed for over a year  She denies having nipple changes, nipple discharge, galactorrhea, right breast symptoms  Has no family  history of breast cancer  Patient has history of external hemorrhoids that she wants to get rid of, was referred to colorectal surgery in March 2022  Patient was not able to drive all the way to [facility] to get the treatment done, she is wondering if she can be referred to a location close to home in [place] or [place]  Denies current concerns for rectal bleeding, constipation, diarrhea but she does get the symptoms whenever she has GI irritation, gastroenteritis      -follow up on left breast discomfort, no discharge or rash, has US and mammo done recently            Review of Systems   CONSTITUTIONAL: NEGATIVE for fever, chills, change in weight  BREAST: as above  GI: as above  ENDOCRINE: History of hypothyroidism  PSYCHIATRIC: NEGATIVE for changes in mood or affect      Objective    /70   Pulse 83   Temp 98.6  F (37  C) (Tympanic)   Resp 20   Wt 56.2 kg (124 lb)   LMP 09/01/2022 (Exact Date)   SpO2 100%   BMI 20.96 kg/m    Body mass index is 20.96 kg/m .  Physical Exam   GENERAL: healthy, alert and no distress  RESP: lungs clear to auscultation - no rales, rhonchi or wheezes  Breast-deferred per patient  CV: regular rate and rhythm, normal S1 S2, no S3 or S4, no murmur, click or rub, no peripheral edema and peripheral pulses strong  PSYCH: mentation appears normal, affect normal/bright

## 2022-11-21 ENCOUNTER — TRANSFERRED RECORDS (OUTPATIENT)
Dept: HEALTH INFORMATION MANAGEMENT | Facility: CLINIC | Age: 37
End: 2022-11-21

## 2022-11-21 NOTE — TELEPHONE ENCOUNTER
Action    Action Taken Per patient, LFT knee pain been constant for few months. No accident or injury. Never been seen, no imaging.          DIAGNOSIS: LFT knee pain   APPOINTMENT DATE: 11/30/2022    NOTES STATUS DETAILS   OFFICE NOTE from referring provider N/A    OFFICE NOTE from other specialist N/A    DISCHARGE SUMMARY from hospital N/A    DISCHARGE REPORT from the ER N/A    OPERATIVE REPORT N/A    EMG report N/A    MEDICATION LIST N/A    MRI N/A    DEXA (osteoporosis/bone health) N/A    CT SCAN N/A    XRAYS (IMAGES & REPORTS) N/A

## 2022-11-22 DIAGNOSIS — M25.562 LEFT KNEE PAIN: Primary | ICD-10-CM

## 2022-11-30 ENCOUNTER — PRE VISIT (OUTPATIENT)
Dept: ORTHOPEDICS | Facility: CLINIC | Age: 37
End: 2022-11-30

## 2022-12-02 DIAGNOSIS — M25.562 LEFT KNEE PAIN: Primary | ICD-10-CM

## 2022-12-07 ENCOUNTER — OFFICE VISIT (OUTPATIENT)
Dept: ORTHOPEDICS | Facility: CLINIC | Age: 37
End: 2022-12-07
Payer: COMMERCIAL

## 2022-12-07 ENCOUNTER — ANCILLARY PROCEDURE (OUTPATIENT)
Dept: GENERAL RADIOLOGY | Facility: CLINIC | Age: 37
End: 2022-12-07
Attending: FAMILY MEDICINE
Payer: COMMERCIAL

## 2022-12-07 DIAGNOSIS — M25.562 LEFT KNEE PAIN: ICD-10-CM

## 2022-12-07 DIAGNOSIS — M25.562 ACUTE PAIN OF LEFT KNEE: Primary | ICD-10-CM

## 2022-12-07 PROCEDURE — 73564 X-RAY EXAM KNEE 4 OR MORE: CPT | Mod: LT | Performed by: RADIOLOGY

## 2022-12-07 PROCEDURE — 99203 OFFICE O/P NEW LOW 30 MIN: CPT | Performed by: FAMILY MEDICINE

## 2022-12-07 NOTE — PATIENT INSTRUCTIONS
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  -People whose feet pronate (roll in) increase the outward pulling on the kneecap as well    SIGNS AND SYMPTOMS:  -Diffuse knee pain that worsens with stairs, squatting, prolonged sitting, jumping, and similar movements.  -Pain may be achy or sharp  -Stiffness with prolonged sitting  -Occasionally, giving way of the knee, grinding or a catching sensation    TREATMENT:  -regular exercise (biking, swimming, or elliptical are good for cardio)  -weight lifting/plyometrics are helpful but remember to keep your knees behind your toes (don't bend knee more than 90degrees)  -regular core exercises (yoga and pilates)  -arch supports may help during exercise until hips stronger to prevent ankle pronation  *over the counter semi-rigid brands include power step arch supports may be purchased at "[employer] or internet  *custom made orthotics may be ordered by your physician if needed for prolonged treatment  -Stretching and strengthening therapy  -Ice 10-15 minutes after activity. (Ice cups for massage 5-7min)  -Ice and NSAIDs help decrease inflammation. A good anti-inflammatory NSAID medication is Alleve (220mg). Take 1-2 tabs twice daily with food until pain improves or minimum of 14 days, then as needed for pain. (1-2 tabs twice daily  dosing is for patients over 12 years old. Trish than 11 yo, check dose with doctor.)  -often component of hip weakness that leads to lower extremity (foot, ankle, shin, and knee) problems so a lot of focus will be on core strength and balance  - recommend yoga for core strengthening and stretching  -Perform exercises as instructed through handout or formal therapy if doing. Until then start with the following:  -Ankle strengthening  1) balance on one foot 1-2 min daily  2) once able to balance for 1 minute, start hip strengthening with 4 way motion with straight leg. Tie theraband around ankle and balance on other foot while doing15 reps each direction of straight leg  motion  3) arch raises- tighten bottom of foot and hold x 3-5 sec, repeat 5 times  4) ankle exercises (4 way with theraband)- 3 sets of 10-15 (fatigue) daily  -usually takes several weeks before pain free but longer before return to full activity without pain  --Once released to increase activity, BE PATIENT!    Return to activity guidelines include:  If it hurts, please avoid activity  Start gradually and build up, wait 24 hours before increase intensity and time  Runnin min on treadmill (or somewhere you can get home easily from if you have to stop), start walk 4 min/run 1 min and Repeat 3 times. If pain free for 48 hours, increase to walk 3 min/run 2 min. Continue to increase as such as pain allows. If you develop pain, back off to previous pain-free level and wait 1 week before increasing again.  Follow-up in 6 weeks if not improved or sooner if further concern.  If problem flares again after resolved, restart icing, and exercises.      Standing hamstring stretch: Put the heel of the leg on your injured side on a stool about 15 inches high. Keep your leg straight. Lean forward, bending at the hips, until you feel a mild stretch in the back of your thigh. Make sure you don't roll your shoulders or bend at the waist when doing this or you will  stretch your lower back instead of your leg. Hold the stretch for 15 to 30 seconds. Repeat 3 times.    Quadriceps stretch: Stand at an arm's length away from the wall with your injured side farthest from the wall. Facing straight ahead, brace yourself by keeping one hand against the wall. With your other hand, grasp the ankle on your injured side and pull your heel toward your buttocks. Don't arch or twist your back. Keep your knees together. Hold this stretch for 15 to 30 seconds.    Side-lying leg lift: Lie on your uninjured side. Tighten the front thigh muscles on your injured leg and lift that leg 8 to 10 inches (20 to 25 centimeters) away from the other leg. Keep the leg straight and lower it slowly. Do 2 sets of 15.    Quad sets: Sit on the floor with your injured leg straight and your other leg bent. Press the back of the knee of your injured leg against the floor by tightening the muscles on the top of your thigh. Hold this position 10 seconds. Relax. Do 2 sets of 15.  Straight leg raise: Lie on your back with your legs straight out in front of you. Bend the knee on your uninjured side and place the foot flat on the floor. Tighten the thigh muscle on your injured side and lift your leg about 8 inches off the floor. Keep your leg straight and your thigh muscle tight. Slowly lower your leg back down to the floor. Do 2 sets of 15.    Clam exercise: Lie on your uninjured side with your hips and knees bent and feet together. Slowly raise your top leg toward the ceiling while keeping your heels touching each other. Hold for 2 seconds and lower slowly. Do 2 sets of 15 repetitions.    Wall squat with a ball: Stand with your back, shoulders, and head against a wall. Look straight ahead. Keep your shoulders relaxed and your feet 3 feet (90 centimeters) from the wall and shoulder's width apart. Place a soccer or basketball-sized ball behind your back. Keeping your back against the wall, slowly squat down to a 45-degree  angle. Your thighs will not yet be parallel to the floor. Hold this position for 10 seconds and then slowly slide back up the wall. Repeat 10 times. Build up to 2 sets of 15.    Knee stabilization: Wrap a piece of elastic tubing around the ankle of your uninjured leg. Tie a knot in the other end of the tubing and close it in a door at about ankle height.  Stand facing the door on the leg without tubing (your injured leg) and bend your knee slightly, keeping your thigh muscles tight. Stay in this position while you move the leg with the tubing (the uninjured leg) straight back behind you. Do 2 sets of 15.  Turn 90 degrees so the leg without tubing is closest to the door. Move the leg with tubing away from your body. Do 2 sets of 15.  Turn 90 degrees again so your back is to the door. Move the leg with tubing straight out in front of you. Do 2 sets of 15.  Turn your body 90 degrees again so the leg with tubing is closest to the door. Move the leg with tubing across your body. Do 2 sets of 15.  Hold onto a chair if you need help balancing. This exercise can be made more challenging by standing on a firm pillow or foam mat while you move the leg with tubing.    Resisted terminal knee extension: Make a loop with a piece of elastic tubing by tying a knot in both ends. Close the knot in a door at knee height. Step into the loop with your injured leg so the tubing is around the back of your knee. Lift the other foot off the ground and hold onto a chair for balance, if needed. Bend the knee with tubing about 45 degrees. Slowly straighten your leg, keeping your thigh muscle tight as you do this. Repeat 15 times. Do 2 sets of 15. If you need an easier way to do this, stand on both legs for better support while you do the exercise.    Standing calf stretch: Stand facing a wall with your hands on the wall at about eye level. Keep your injured leg back with your heel on the floor. Keep the other leg forward with the knee bent.  Turn your back foot slightly inward (as if you were pigeon-toed). Slowly lean into the wall until you feel a stretch in the back of your calf. Hold the stretch for 15 to 30 seconds. Return to the starting position. Repeat 3 times. Do this exercise several times each day.    Step-up: Stand with the foot of your injured leg on a support 3 to 5 inches (8 to 13 centimeters) high --like a small step or block of wood. Keep your other foot flat on the floor. Shift your weight onto the injured leg on the support. Straighten your injured leg as the other leg comes off the floor. Return to the starting position by bending your injured leg and slowly lowering your uninjured leg back to the floor. Do 2 sets of 15.    Iliotibial band stretch, side-bending: Cross one leg in front of the other leg and lean in the opposite direction from the front leg. Reach the arm on the side of the back leg over your head while you do this. Hold this position for 15 to 30 seconds. Return to the starting position. Repeat 3 times and then switch legs and repeat the exercise.  Developed by [employer].  Published by [employer].  Copyright  2014 [employer] and/or one of its subsidiaries. All rights reserved.

## 2022-12-07 NOTE — LETTER
12/7/2022         RE: Lalitha Dey  [address] [address] [place] [id number]        Dear Colleague,    Thank you for referring your patient, Lalitha Dey, to the [facility] SPORTS MEDICINE CLINIC [place]. Please see a copy of my visit note below.      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    Reviewed imaging and assessment with patient in detail  We discussed the use of the knee sleeve with patellar cutout for comfort  Recommended use of topical diclofenac or oral NSAIDs as needed for pain.  She was provided with home exercises and offered referral to physical therapy  Would recommend follow-up in clinic if she experiences recurrence in symptoms so that we may evaluate her while she is acutely symptomatic.    Sky Bowens MD  [facility] SPORTS MEDICINE [facility]    -----  Chief Complaint   Patient presents with     Consult For     Left knee pain       SUBJECTIVE  Lalitha Dey is a/an 37 year old female who is seen as a self referral for evaluation of left knee pain.     The patient is seen by themselves.  The patient is Right handed    Onset: Many years but 1 month(s) ago just increased in pain. Reports insidious onset without acute precipitating event.  Location of Pain: left knee -all over.  Has improved considerably but is quite debilitating.    Worsened by: Sitting cross-legged, up and down stairs; was constant pain but now feeling a little better.  Better with: Not crossing legs, putting pillow under knee when sleeping  Treatments tried: rest/activity avoidance  Associated symptoms: no distal numbness or tingling; denies swelling or warmth    Orthopedic/Surgical history: NO  Social History/Occupation: Marketing - desk position    Mom has  SLE      REVIEW OF SYSTEMS:    Do you have fever, chills, weight loss? No    Do you have any vision problems? No    Do you have any chest pain or edema? No    Do you have any shortness of breath or wheezing?  No    Do you have stomach problems? No    Do you have any numbness or focal weakness? No    Do you have diabetes? No    Do you have problems with bleeding or clotting? No    Do you have an rashes or other skin lesions? No    OBJECTIVE:  There were no vitals taken for this visit.     Patient is alert, No acute distress, pleasant and conversational.    Gait: nonantalgic. Normal heel toe gait.    Patient is able to perform two legged squat without difficulty.    left knee:   Skin intact. No erythema or ecchymosis.  No effusion or soft tissue swelling.    AROM: Zero to approximately 135  without restriction or reported pain.    Palpation: No medial or lateral facet joint tenderness.  No posterior medial or posterior lateral joint line tenderness     Special Tests:  Negative bounce test, negative forced flexion and negative Donna's.  No ligamentous laxity or pain with valgus or varus stress.  Negative Lachman's, Anterior Drawer and Posterior Drawer     Full Isometric quad strength, extensor mechanism in place     Neurovascularly intact in the lower extremity    Hip and Ankle with full AROM and nontender      RADIOLOGY:    4 view xrays of left knee performed and reviewed independently demonstrating no acute fracture or dislocation . No significant djd. See EMR for formal radiology report.              Again, thank you for allowing me to participate in the care of your patient.        Sincerely,        Sky Bowens MD

## 2022-12-07 NOTE — PROGRESS NOTES
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    Reviewed imaging and assessment with patient in detail  We discussed the use of the knee sleeve with patellar cutout for comfort  Recommended use of topical diclofenac or oral NSAIDs as needed for pain.  She was provided with home exercises and offered referral to physical therapy  Would recommend follow-up in clinic if she experiences recurrence in symptoms so that we may evaluate her while she is acutely symptomatic.    Sky Bowens MD  [facility] SPORTS MEDICINE CLINIC [place]    -----  Chief Complaint   Patient presents with     Consult For     Left knee pain       SUBJECTIVE  Lalitha Dey is a/an 37 year old female who is seen as a self referral for evaluation of left knee pain.     The patient is seen by themselves.  The patient is Right handed    Onset: Many years but 1 month(s) ago just increased in pain. Reports insidious onset without acute precipitating event.  Location of Pain: left knee -all over.  Has improved considerably but is quite debilitating.    Worsened by: Sitting cross-legged, up and down stairs; was constant pain but now feeling a little better.  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 No    Do you have any vision problems? No    Do you have any chest pain or edema? No    Do you have any shortness of breath or wheezing?  No    Do you have stomach problems? No    Do you have any numbness or  focal weakness? No    Do you have diabetes? No    Do you have problems with bleeding or clotting? No    Do you have an rashes or other skin lesions? No    OBJECTIVE:  There were no vitals taken for this visit.     Patient is alert, No acute distress, pleasant and conversational.    Gait: nonantalgic. Normal heel toe gait.    Patient is able to perform two legged squat without difficulty.    left knee:   Skin intact. No erythema or ecchymosis.  No effusion or soft tissue swelling.    AROM: Zero to approximately 135  without restriction or reported pain.    Palpation: No medial or lateral facet joint tenderness.  No posterior medial or posterior lateral joint line tenderness     Special Tests:  Negative bounce test, negative forced flexion and negative Donna's.  No ligamentous laxity or pain with valgus or varus stress.  Negative Lachman's, Anterior Drawer and Posterior Drawer     Full Isometric quad strength, extensor mechanism in place     Neurovascularly intact in the lower extremity    Hip and Ankle with full AROM and nontender      RADIOLOGY:    4 view xrays of left knee performed and reviewed independently demonstrating no acute fracture or dislocation . No significant djd. See EMR for formal radiology report.

## 2023-03-07 ENCOUNTER — PATIENT OUTREACH (OUTPATIENT)
Dept: ONCOLOGY | Facility: CLINIC | Age: 38
End: 2023-03-07

## 2023-03-07 ENCOUNTER — OFFICE VISIT (OUTPATIENT)
Dept: FAMILY MEDICINE | Facility: CLINIC | Age: 38
End: 2023-03-07
Payer: COMMERCIAL

## 2023-03-07 VITALS
WEIGHT: 124.5 LBS | RESPIRATION RATE: 16 BRPM | HEART RATE: 78 BPM | HEIGHT: 65 IN | OXYGEN SATURATION: 99 % | DIASTOLIC BLOOD PRESSURE: 63 MMHG | BODY MASS INDEX: 20.74 KG/M2 | TEMPERATURE: 97 F | SYSTOLIC BLOOD PRESSURE: 96 MMHG

## 2023-03-07 DIAGNOSIS — Z13.1 SCREENING FOR DIABETES MELLITUS (DM): ICD-10-CM

## 2023-03-07 DIAGNOSIS — Z12.4 CERVICAL CANCER SCREENING: ICD-10-CM

## 2023-03-07 DIAGNOSIS — E06.3 HYPOTHYROIDISM DUE TO HASHIMOTO'S THYROIDITIS: ICD-10-CM

## 2023-03-07 DIAGNOSIS — Z00.00 ROUTINE GENERAL MEDICAL EXAMINATION AT A HEALTH CARE FACILITY: Primary | ICD-10-CM

## 2023-03-07 DIAGNOSIS — R07.89 LEFT-SIDED CHEST WALL PAIN: ICD-10-CM

## 2023-03-07 DIAGNOSIS — D22.9 MULTIPLE PIGMENTED NEVI: ICD-10-CM

## 2023-03-07 DIAGNOSIS — Z13.0 SCREENING FOR DEFICIENCY ANEMIA: ICD-10-CM

## 2023-03-07 DIAGNOSIS — Z13.220 SCREENING FOR HYPERLIPIDEMIA: ICD-10-CM

## 2023-03-07 DIAGNOSIS — Z80.9 FAMILY HISTORY OF CANCER: ICD-10-CM

## 2023-03-07 DIAGNOSIS — Z83.719 FAMILY HISTORY OF POLYPS IN THE COLON: ICD-10-CM

## 2023-03-07 DIAGNOSIS — Z01.84 IMMUNITY STATUS TESTING: ICD-10-CM

## 2023-03-07 DIAGNOSIS — R43.9 SENSE OF SMELL ALTERED: ICD-10-CM

## 2023-03-07 LAB
ERYTHROCYTE [DISTWIDTH] IN BLOOD BY AUTOMATED COUNT: 11.6 % (ref 10–15)
HBV SURFACE AB SERPL IA-ACNC: 0.75 M[IU]/ML
HBV SURFACE AB SERPL IA-ACNC: NONREACTIVE M[IU]/ML
HCT VFR BLD AUTO: 42.5 % (ref 35–47)
HGB BLD-MCNC: 14 G/DL (ref 11.7–15.7)
MCH RBC QN AUTO: 29 PG (ref 26.5–33)
MCHC RBC AUTO-ENTMCNC: 32.9 G/DL (ref 31.5–36.5)
MCV RBC AUTO: 88 FL (ref 78–100)
PLATELET # BLD AUTO: 240 10E3/UL (ref 150–450)
RBC # BLD AUTO: 4.82 10E6/UL (ref 3.8–5.2)
TSH SERPL DL<=0.005 MIU/L-ACNC: 3.43 MU/L (ref 0.4–4)
WBC # BLD AUTO: 4.2 10E3/UL (ref 4–11)

## 2023-03-07 PROCEDURE — 36415 COLL VENOUS BLD VENIPUNCTURE: CPT | Performed by: FAMILY MEDICINE

## 2023-03-07 PROCEDURE — 85027 COMPLETE CBC AUTOMATED: CPT | Performed by: FAMILY MEDICINE

## 2023-03-07 PROCEDURE — 99395 PREV VISIT EST AGE 18-39: CPT | Performed by: FAMILY MEDICINE

## 2023-03-07 PROCEDURE — 86706 HEP B SURFACE ANTIBODY: CPT | Performed by: FAMILY MEDICINE

## 2023-03-07 PROCEDURE — 84443 ASSAY THYROID STIM HORMONE: CPT | Performed by: FAMILY MEDICINE

## 2023-03-07 PROCEDURE — 99214 OFFICE O/P EST MOD 30 MIN: CPT | Mod: 25 | Performed by: FAMILY MEDICINE

## 2023-03-07 RX ORDER — LEVOTHYROXINE SODIUM 50 UG/1
50 TABLET ORAL DAILY
Qty: 90 TABLET | Refills: 3 | Status: SHIPPED | OUTPATIENT
Start: 2023-03-07 | End: 2024-03-11

## 2023-03-07 ASSESSMENT — ENCOUNTER SYMPTOMS
HEMATURIA: 0
NAUSEA: 0
ABDOMINAL PAIN: 0
JOINT SWELLING: 0
EYE PAIN: 0
DIARRHEA: 0
HEADACHES: 0
FREQUENCY: 0
NERVOUS/ANXIOUS: 0
DYSURIA: 0
BREAST MASS: 0
HEMATOCHEZIA: 0
DIZZINESS: 0
SORE THROAT: 0
FEVER: 0
PALPITATIONS: 0
WEAKNESS: 0
HEARTBURN: 0
CONSTIPATION: 0
PARESTHESIAS: 0
MYALGIAS: 0
COUGH: 0
CHILLS: 0
SHORTNESS OF BREATH: 0
ARTHRALGIAS: 0

## 2023-03-07 ASSESSMENT — PAIN SCALES - GENERAL: PAINLEVEL: NO PAIN (0)

## 2023-03-07 NOTE — PROGRESS NOTES
SUBJECTIVE:   CC: Lalitha is an 37 year old who presents for preventive health visit.  Patient is here for annual physical and with other concerns as mentioned below  Family history of cancers-patient is wondering if she can get testing done to evaluate in general for cancers  Has family history of lung cancer and a paternal aunt who was a smoker, maternal grandmother  of unknown cancer  Has family history of colon polyps in mother  Patient has personal history of hypothyroidism  Left-sided chest wall pain-patient continues to have pain in the muscles of the left upper chest wall for the past 6+ months  Denies pain in the breast, lumps in the breast, nipple discharge  Was evaluated with breast ultrasound and diagnostic mammogram in 2022 after she had a visit with Malgorzata Conde, for left breast pain  Patient denies right-sided symptoms  Denies tingling, numbness or weakness of the left upper extremity  Altered sense of smell-patient reported smelling smoke for the past 1 year with no associated concerns for headache, dizziness, tingling, numbness or weakness of extremities, stuffy nose, history of chronic nasal congestion, recurrent sinus infection, postnasal drainage  Patient reported that her symptoms started after her last COVID vaccination on 2022  Patient reported having COVID-19 illness in 2022    Patient has been advised of split billing requirements and indicates understanding: Yes  Healthy Habits:     Getting at least 3 servings of Calcium per day:  Yes    Bi-annual eye exam:  Yes    Dental care twice a year:  Yes    Sleep apnea or symptoms of sleep apnea:  None    Diet:  Regular (no restrictions)    Frequency of exercise:  1 day/week    Duration of exercise:  Less than 15 minutes    Taking medications regularly:  Yes    Medication side effects:  None    PHQ-2 Total Score: 0    Additional concerns today:  Yes          Hypothyroidism Follow-up      Since last visit, patient  describes the following symptoms: Weight stable, no hair loss, no skin changes, no constipation, no loose stools      Today's PHQ-2 Score:   PHQ-2 (  [employer]) 3/7/2023   Q1: Little interest or pleasure in doing things 0   Q2: Feeling down, depressed or hopeless 0   PHQ-2 Score 0   PHQ-2 Total Score (12-17 Years)- Positive if 3 or more points; Administer PHQ-A if positive -   Q1: Little interest or pleasure in doing things Not at all   Q2: Feeling down, depressed or hopeless Not at all   PHQ-2 Score 0           Social History     Tobacco Use     Smoking status: Former     Types: Cigarettes     Quit date: 2008     Years since quittin.6     Smokeless tobacco: Never     Tobacco comments:     Lives in smoke free household   Substance Use Topics     Alcohol use: Yes     Comment: One drink per month         Alcohol Use 3/7/2023   Prescreen: >3 drinks/day or >7 drinks/week? No   No flowsheet data found.    Reviewed orders with patient.  Reviewed health maintenance and updated orders accordingly - Yes  Lab work is in process  Labs reviewed in EPIC  BP Readings from Last 3 Encounters:   23 96/63   22 110/70   22 105/66    Wt Readings from Last 3 Encounters:   23 56.5 kg (124 lb 8 oz)   22 56.2 kg (124 lb)   22 56.4 kg (124 lb 6.4 oz)                  Patient Active Problem List   Diagnosis     CARDIOVASCULAR SCREENING; LDL GOAL LESS THAN 160     Seasonal allergic rhinitis     Acne     Conjunctivitis, allergic     Dry eyes     Blepharitis     Hypothyroidism     Menometrorrhagia     S/P ectopic pregnancy     Gastroesophageal reflux disease without esophagitis     S/P  section     Encounter for initial prescription of contraceptive pills      delivery delivered     Family history of autoimmune disorder, MS and Lupus in mother     External hemorrhoids     Lipoma of skin and subcutaneous tissue     Anxiety     Breast pain, left     Family history of polyps in the  colon     Family history of cancer     Left-sided chest wall pain     Multiple pigmented nevi     Sense of smell altered     Past Surgical History:   Procedure Laterality Date     MOLE REMOVAL      Several     NO HISTORY OF SURGERY       SALPINGECTOMY Left 2017    Ectopic pregnancy       Social History     Tobacco Use     Smoking status: Former     Types: Cigarettes     Quit date: 2008     Years since quittin.6     Smokeless tobacco: Never     Tobacco comments:     Lives in smoke free household   Substance Use Topics     Alcohol use: Yes     Comment: One drink per month     Family History   Problem Relation Age of Onset     Neurologic Disorder Mother         MSAND LUPUS     Thyroid Disease Mother      Hashimoto's thyroiditis Mother      Lupus Mother      Multiple Sclerosis Mother      Colon Polyps Mother      Depression Father      Thyroid Disease Father      Depression Brother      Thyroid Disease Maternal Grandmother      Cancer Maternal Grandmother      Thyroid Disease Maternal Grandfather      Thyroid Disease Paternal Grandmother      Thyroid Disease Paternal Grandfather      Diabetes No family hx of      Hypertension No family hx of      Cerebrovascular Disease No family hx of      Glaucoma No family hx of      Macular Degeneration No family hx of      Breast Cancer No family hx of          Current Outpatient Medications   Medication Sig Dispense Refill     Cetirizine HCl (ZYRTEC PO) Take 10 mg by mouth as needed        levothyroxine (SYNTHROID/LEVOTHROID) 50 MCG tablet Take 1 tablet (50 mcg) by mouth daily 90 tablet 3     Multiple Vitamin (MULTIVITAMIN ADULT PO)        Allergies   Allergen Reactions     Nkda [No Known Drug Allergies]      Seasonal Allergies      Recent Labs   Lab Test 23  0911 22  0906 20  1218 19  1427   LDL  --  79  --   --    HDL  --  58  --   --    TRIG  --  64  --   --    ALT  --   --   --  18   CR  --   --   --  0.78   GFRESTIMATED  --   --   --   >90   GFRESTBLACK  --   --   --  >90   POTASSIUM  --   --   --  3.8   TSH 3.43 1.98   < > 2.23    < > = values in this interval not displayed.        Breast Cancer Screening:    Breast CA Risk Assessment (FHS-7) 3/29/2022   Do you have a family history of breast, colon, or ovarian cancer? No / Unknown       click delete button to remove this line now  Patient under 40 years of age: Routine Mammogram Screening not recommended.   Pertinent mammograms are reviewed under the imaging tab.    History of abnormal Pap smear: NO - age 30-65 PAP every 5 years with negative HPV co-testing recommended  PAP / HPV Latest Ref Rng & Units 2019 2016 10/21/2013   PAP (Historical) - NIL NIL NIL   HPV16 NEG:Negative Negative - -   HPV18 NEG:Negative Negative - -   HRHPV NEG:Negative Negative - -     Reviewed and updated as needed this visit by clinical staff   Tobacco  Allergies  Meds     Fam Hx          Reviewed and updated as needed this visit by Provider         [facility]           Past Medical History:   Diagnosis Date     GERD (gastroesophageal reflux disease)      Hypothyroid      Intradermal nevus 2007    right arm-irritated intradermal nevus      Past Surgical History:   Procedure Laterality Date     MOLE REMOVAL      Several     NO HISTORY OF SURGERY       SALPINGECTOMY Left 2017    Ectopic pregnancy     OB History    Para Term  AB Living   2 1 1 0 0 1   SAB IAB Ectopic Multiple Live Births   0 0 0 0 1      # Outcome Date GA Lbr Chaz/2nd Weight Sex Delivery Anes PTL Lv   2 Term 18 40w5d  3.742 kg (8 lb 4 oz)  CS-LTranv EPI N LORIN      Complications: Failure to Progress in First Stage      Apgar1: 8  Apgar5: 8   1                 Review of Systems   Constitutional: Negative for chills and fever.   HENT: Negative for congestion, ear pain, hearing loss and sore throat.    Eyes: Negative for pain and visual disturbance.   Respiratory: Negative for cough and shortness of breath.   "  Cardiovascular: Negative for chest pain, palpitations and peripheral edema.   Gastrointestinal: Negative for abdominal pain, constipation, diarrhea, heartburn, hematochezia and nausea.   Breasts:  Positive for tenderness. Negative for breast mass and discharge.   Genitourinary: Negative for dysuria, frequency, genital sores, hematuria, pelvic pain, urgency, vaginal bleeding and vaginal discharge.   Musculoskeletal: Negative for arthralgias, joint swelling and myalgias.   Skin: Negative for rash.   Neurological: Negative for dizziness, weakness, headaches and paresthesias.   Psychiatric/Behavioral: Negative for mood changes. The patient is not nervous/anxious.      CONSTITUTIONAL: NEGATIVE for fever, chills, change in weight  INTEGUMENTARY/SKIN: History of multiple pigmented nevi  EYES: NEGATIVE for vision changes or irritation  ENT: Altered sense of smell  RESP: NEGATIVE for significant cough or SOB  BREAST: NEGATIVE for masses, tenderness or discharge  CV: NEGATIVE for chest pain, palpitations or peripheral edema  GI: NEGATIVE for nausea, abdominal pain, heartburn, or change in bowel habits  : NEGATIVE for unusual urinary or vaginal symptoms. Periods are regular.  MUSCULOSKELETAL: Left upper chest wall pain  NEURO: NEGATIVE for weakness, dizziness or paresthesias  ENDOCRINE: NEGATIVE for temperature intolerance, skin/hair changes  ENDOCRINE: History of hypothyroidism  HEME/ALLERGY/IMMUNE: NEGATIVE for bleeding problems  PSYCHIATRIC: NEGATIVE for changes in mood or affect     OBJECTIVE:   BP 96/63 (BP Location: Right arm, Patient Position: Sitting, Cuff Size: Adult Regular)   Pulse 78   Temp 97  F (36.1  C) (Tympanic)   Resp 16   Ht 1.638 m (5' 4.5\")   Wt 56.5 kg (124 lb 8 oz)   SpO2 99%   BMI 21.04 kg/m    Physical Exam  GENERAL: healthy, alert and no distress  EYES: Eyes grossly normal to inspection, PERRL and conjunctivae and sclerae normal  HENT: ear canals and TM's normal, nose and mouth without " ulcers or lesions  NECK: no adenopathy, no asymmetry, masses, or scars and thyroid normal to palpation  RESP: lungs clear to auscultation - no rales, rhonchi or wheezes  BREAST: normal without masses, tenderness or nipple discharge and no palpable axillary masses or adenopathy  CV: regular rate and rhythm, normal S1 S2, no S3 or S4, no murmur, click or rub, no peripheral edema and peripheral pulses strong  ABDOMEN: soft, nontender, no hepatosplenomegaly, no masses and bowel sounds normal  MS: Minimal to moderate tenderness over the left pectoral muscles  SKIN: Multiple benign-appearing pigmented nevi in the extremities  NEURO: Normal strength and tone, mentation intact and speech normal  PSYCH: mentation appears normal, affect normal/bright    Diagnostic Test Results:  Labs reviewed in Epic    ASSESSMENT/PLAN:   (Z00.00) Routine general medical examination at a health care facility  (primary encounter diagnosis)  Comment:   Plan: Discussed on regular exercises, healthy eating, self breast exams monthly and routine dental checks.        (E03.8,  E06.3) Hypothyroidism due to Hashimoto's thyroiditis  Comment:   Plan: levothyroxine (SYNTHROID/LEVOTHROID) 50 MCG         tablet                    TSH   Date Value Ref Range Status   03/07/2023 3.43 0.40 - 4.00 mU/L Final   02/16/2022 1.98 0.40 - 4.00 mU/L Final   03/02/2021 3.56 0.40 - 4.00 mU/L Final   02/11/2020 1.54 0.40 - 4.00 mU/L Final   09/09/2019 2.23 0.40 - 4.00 mU/L Final   08/02/2019 5.43 (H) 0.40 - 4.00 mU/L Final   10/09/2018 2.15 0.40 - 4.00 mU/L Final     Reviewed normal thyroid labs, recommended to continue with current dose of levothyroxine, refills were sent today      (R43.9) Sense of smell altered  Comment:   Plan: Adult ENT  Referral        Recommended to consult ENT for further evaluation due to ongoing symptoms for a year    (R07.89) Left-sided chest wall pain  Comment:   Plan: Reassured patient  Recommended gentle chest wall stretches, try  over-the-counter topical diclofenac gel 4 times daily as needed for pain  If symptoms get worse, consider orthopedic consult for further evaluation  Reviewed negative left breast US and diagnostic mammogram from 9/2022    (D22.9) Multiple pigmented nevi  Comment: dermatology specialists in [place], Dr. Clarke  Plan: Continue with dermatology follow-ups, sunscreen use    (Z80.9) Family history of cancer  Comment: paternal aunt wiht lung cancer, MGM with unknown cancer  Plan: Cancer Risk Mgmt/Cancer Genetic Counseling         Referral        Recommended to consult genetic counseling as patient is desiring due to family history of cancers    (Z83.71) Family history of polyps in the colon  Comment: mother  Plan: Patient reported that she is scheduled for colonoscopy next month at [facility]    (Z13.0) Screening for deficiency anemia  Comment:   Plan: CBC with platelets            (Z13.1) Screening for diabetes mellitus (DM)  Comment:   Plan: CMP    (Z13.220) Screening for hyperlipidemia  Comment:   Plan: Lipids    (Z12.4) Cervical cancer screening  Comment:   Plan: Patient is not due for Pap today    (Z01.84) Immunity status testing  Comment:   Plan: Hepatitis B Surface Antibody  Patient is not sure if she received vaccinations earlier   we will check the hepatitis B surface antibody titers to check for immunity   If negative, will let patient know to come for vaccination                 COUNSELING:  Reviewed preventive health counseling, as reflected in patient instructions  Special attention given to:        Regular exercise       Healthy diet/nutrition       Vision screening                The ASCVD Risk score (Karen PELAYO, et al., 2019) failed to calculate for the following reasons:    The 2019 ASCVD risk score is only valid for ages 40 to 79        She reports that she quit smoking about 14 years ago. She has never used smokeless tobacco.      Chart documentation done in part with Dragon Voice recognition Software. Although  reviewed after completion, some word and grammatical error may remain.  Kiara Morelos MD  [facility]

## 2023-03-07 NOTE — RESULT ENCOUNTER NOTE
Glen Doty,  Your labs showed normal thyroid functions and hemoglobin with no concern for anemia  Let us continue with current dose of levothyroxine, refills were sent today.   Let me know if you have any questions. Take care.  Kiara Morelos MD

## 2023-03-07 NOTE — PROGRESS NOTES
Writer received referral, reviewed for appropriate plan, and sent to New Patient Scheduling for completion.    Miranda Chavarria, RN, BSN  Oncology New Patient Nurse Navigator   [facility]  [phone]

## 2023-03-07 NOTE — PATIENT INSTRUCTIONS
Use topical diclofenac gel up to 4 times a day as needed for the left chest wall pain    Preventive Health Recommendations  Female Ages 26 - 39  Yearly exam:   See your health care provider every year in order to  Review health changes.   Discuss preventive care.    Review your medicines if you your doctor has prescribed any.    Until age 30: Get a Pap test every three years (more often if you have had an abnormal result).    After age 30: Talk to your doctor about whether you should have a Pap test every 3 years or have a Pap test with HPV screening every 5 years.   You do not need a Pap test if your uterus was removed (hysterectomy) and you have not had cancer.  You should be tested each year for STDs (sexually transmitted diseases), if you're at risk.   Talk to your provider about how often to have your cholesterol checked.  If you are at risk for diabetes, you should have a diabetes test (fasting glucose).  Shots: Get a flu shot each year. Get a tetanus shot every 10 years.   Nutrition:   Eat at least 5 servings of fruits and vegetables each day.  Eat whole-grain bread, whole-wheat pasta and brown rice instead of white grains and rice.  Get adequate Calcium and Vitamin D.     Lifestyle  Exercise at least 150 minutes a week (30 minutes a day, 5 days of the week). This will help you control your weight and prevent disease.  Limit alcohol to one drink per day.  No smoking.   Wear sunscreen to prevent skin cancer.  See your dentist every six months for an exam and cleaning.

## 2023-03-08 NOTE — RESULT ENCOUNTER NOTE
Glen Doty,  Your hepatitis B antibody titers are low or negative, suggestive of the need for vaccination  As we discussed, please call for a nurse only appointment to have the injection hepatitis B immunization done.   Let me know if you have any questions. Take care.  Kiara Morelos MD

## 2023-03-09 ENCOUNTER — MYC MEDICAL ADVICE (OUTPATIENT)
Dept: FAMILY MEDICINE | Facility: CLINIC | Age: 38
End: 2023-03-09
Payer: COMMERCIAL

## 2023-03-09 DIAGNOSIS — Z01.84 IMMUNITY STATUS TESTING: Primary | ICD-10-CM

## 2023-03-09 NOTE — PROGRESS NOTES
I am seeing this patient in consultation for sense of smell altered at the request of the provider Kiara Varela.    Chief Complaint - phantom smell    History of Present Illness - Lalitha Dey is a 37 year old female who presents for evaluation of smelling smoke starting last 2022. Initially she had 3 weeks of the smell. Then it went away. However, it has been intermittent since. It isn't associated with anything. She got COVID 2022, but that was after this started. She didn't knowingly have COVID prior to this. She denies having an upper respiratory infection when this started. The patient has the other following nose symptoms - none during these episodes. The patient notes + allergies. Her other sense of smell is normal, it hasn't decreased. Treatments have included nothing. No head trauma. She takes zyrtec daily.     Tests personally reviewed today for this visit:   1.) CBC was normal 3/7/23  2.) TSH was normal 3/7/23    Past Medical History -   Patient Active Problem List   Diagnosis     CARDIOVASCULAR SCREENING; LDL GOAL LESS THAN 160     Seasonal allergic rhinitis     Acne     Conjunctivitis, allergic     Dry eyes     Blepharitis     Hypothyroidism     Menometrorrhagia     S/P ectopic pregnancy     Gastroesophageal reflux disease without esophagitis     S/P  section     Encounter for initial prescription of contraceptive pills      delivery delivered     Family history of autoimmune disorder, MS and Lupus in mother     External hemorrhoids     Lipoma of skin and subcutaneous tissue     Anxiety     Breast pain, left     Family history of polyps in the colon     Family history of cancer     Left-sided chest wall pain     Multiple pigmented nevi     Sense of smell altered       Current Medications -   Current Outpatient Medications:      Cetirizine HCl (ZYRTEC PO), Take 10 mg by mouth as needed , Disp: , Rfl:      levothyroxine (SYNTHROID/LEVOTHROID) 50 MCG tablet, Take 1 tablet (50  mcg) by mouth daily, Disp: 90 tablet, Rfl: 3     Multiple Vitamin (MULTIVITAMIN ADULT PO), , Disp: , Rfl:     Allergies -   Allergies   Allergen Reactions     Nkda [No Known Drug Allergies]      Seasonal Allergies        Social History -   Social History     Socioeconomic History     Marital status:    Tobacco Use     Smoking status: Former     Types: Cigarettes     Quit date: 2008     Years since quittin.6     Smokeless tobacco: Never     Tobacco comments:     Lives in smoke free household   Vaping Use     Vaping Use: Never used   Substance and Sexual Activity     Alcohol use: Yes     Comment: One drink per month     Drug use: No     Sexual activity: Yes     Partners: Male     Birth control/protection: None       Family History -   Family History   Problem Relation Age of Onset     Neurologic Disorder Mother         MSAND LUPUS     Thyroid Disease Mother      Hashimoto's thyroiditis Mother      Lupus Mother      Multiple Sclerosis Mother      Colon Polyps Mother      Depression Father      Thyroid Disease Father      Depression Brother      Thyroid Disease Maternal Grandmother      Cancer Maternal Grandmother      Thyroid Disease Maternal Grandfather      Thyroid Disease Paternal Grandmother      Thyroid Disease Paternal Grandfather      Diabetes No family hx of      Hypertension No family hx of      Cerebrovascular Disease No family hx of      Glaucoma No family hx of      Macular Degeneration No family hx of      Breast Cancer No family hx of      Review of Systems - As per HPI and PMHx, otherwise 7 system review of the head and neck is negative.    Physical Exam  General - The patient is in no distress. Alert and oriented to person and place, answers questions and cooperates with examination appropriately.   Neurologic - CN II-XII are grossly intact. No focal neurologic deficits.   Voice and Breathing - The patient was breathing comfortably without the use of accessory muscles. There was no  wheezing, stridor, or stertor.  The patients voice was clear and strong.  Eyes - Extraocular movements intact. Sclera were not icteric or injected, conjunctiva were pink and moist.  Mouth - Examination of the oral cavity showed pink, healthy oral mucosa. No lesions or ulcerations noted.  The tongue was mobile and midline, and the dentition were in good condition.    Throat - The walls of the oropharynx were smooth, pink, moist, symmetric, and had no lesions or ulcerations. The tonsillar pillars and soft palate were symmetric.  The uvula was midline on elevation. She has tonsil stones on the right, one small one. Tonsils 1+ endophytic and cryptic.   Neck -  Soft, non-tender. Palpation of the occipital, submental, submandibular, internal jugular chain, and supraclavicular nodes did not demonstrate any abnormal lymph nodes or masses. No parotid masses. Palpation of the thyroid was soft and smooth, with no nodules or goiter appreciated.  The trachea was mobile and midline.  Nose - External contour is symmetric, no gross deflection or scars.  Nasal mucosa is pink and moist with clear mucus. The turbinates are normal in size. The septum was midline and non-obstructive.  No polyps, masses, or purulence noted on examination.      A/P - Lalitha Dey is a 37 year old female with phantom smell in which she intermittently smells smoke.  I recommend MRI brain to rule out any olfactory or other intracranial lesions.  Possible she is having foul smell from a sinus or throat issue.  MRI brain to look at the sinuses to rule out chronic sinusitis.  She did have tonsil stones on today's exam and she was not aware that.  She could try some self cleaning with a Waterpik and is possible she is smelling these in her brain is interpreting this is a smoke smell.  Acid reflux is possible but less likely.  I will call her with MRI results and neck steps.    Beto Patel MD  Otolaryngology  [facility]

## 2023-03-10 ENCOUNTER — OFFICE VISIT (OUTPATIENT)
Dept: OTOLARYNGOLOGY | Facility: CLINIC | Age: 38
End: 2023-03-10
Payer: COMMERCIAL

## 2023-03-10 VITALS — RESPIRATION RATE: 16 BRPM | HEART RATE: 85 BPM | OXYGEN SATURATION: 98 %

## 2023-03-10 DIAGNOSIS — R44.2 PHANTOSMIA: Primary | ICD-10-CM

## 2023-03-10 PROCEDURE — 99243 OFF/OP CNSLTJ NEW/EST LOW 30: CPT | Performed by: OTOLARYNGOLOGY

## 2023-03-10 NOTE — LETTER
3/10/2023         RE: Lalitha Dey  [id number] [address] [place] [id number]        Dear Colleague,    Thank you for referring your patient, Lalitha Dey, to the [facility]. Please see a copy of my visit note below.    I am seeing this patient in consultation for sense of smell altered at the request of the provider Kiara Varela.    Chief Complaint - phantom smell    History of Present Illness - Lalitha Dey is a 37 year old female who presents for evaluation of smelling smoke starting last 2022. Initially she had 3 weeks of the smell. Then it went away. However, it has been intermittent since. It isn't associated with anything. She got COVID 2022, but that was after this started. She didn't knowingly have COVID prior to this. She denies having an upper respiratory infection when this started. The patient has the other following nose symptoms - none during these episodes. The patient notes + allergies. Her other sense of smell is normal, it hasn't decreased. Treatments have included nothing. No head trauma. She takes zyrtec daily.     Tests personally reviewed today for this visit:   1.) CBC was normal 3/7/23  2.) TSH was normal 3/7/23    Past Medical History -   Patient Active Problem List   Diagnosis     CARDIOVASCULAR SCREENING; LDL GOAL LESS THAN 160     Seasonal allergic rhinitis     Acne     Conjunctivitis, allergic     Dry eyes     Blepharitis     Hypothyroidism     Menometrorrhagia     S/P ectopic pregnancy     Gastroesophageal reflux disease without esophagitis     S/P  section     Encounter for initial prescription of contraceptive pills      delivery delivered     Family history of autoimmune disorder, MS and Lupus in mother     External hemorrhoids     Lipoma of skin and subcutaneous tissue     Anxiety     Breast pain, left     Family history of polyps in the colon     Family history of cancer     Left-sided chest wall pain     Multiple  pigmented nevi     Sense of smell altered       Current Medications -   Current Outpatient Medications:      Cetirizine HCl (ZYRTEC PO), Take 10 mg by mouth as needed , Disp: , Rfl:      levothyroxine (SYNTHROID/LEVOTHROID) 50 MCG tablet, Take 1 tablet (50 mcg) by mouth daily, Disp: 90 tablet, Rfl: 3     Multiple Vitamin (MULTIVITAMIN ADULT PO), , Disp: , Rfl:     Allergies -   Allergies   Allergen Reactions     Nkda [No Known Drug Allergies]      Seasonal Allergies        Social History -   Social History     Socioeconomic History     Marital status:    Tobacco Use     Smoking status: Former     Types: Cigarettes     Quit date: 2008     Years since quittin.6     Smokeless tobacco: Never     Tobacco comments:     Lives in smoke free household   Vaping Use     Vaping Use: Never used   Substance and Sexual Activity     Alcohol use: Yes     Comment: One drink per month     Drug use: No     Sexual activity: Yes     Partners: Male     Birth control/protection: None       Family History -   Family History   Problem Relation Age of Onset     Neurologic Disorder Mother         MSAND LUPUS     Thyroid Disease Mother      Hashimoto's thyroiditis Mother      Lupus Mother      Multiple Sclerosis Mother      Colon Polyps Mother      Depression Father      Thyroid Disease Father      Depression Brother      Thyroid Disease Maternal Grandmother      Cancer Maternal Grandmother      Thyroid Disease Maternal Grandfather      Thyroid Disease Paternal Grandmother      Thyroid Disease Paternal Grandfather      Diabetes No family hx of      Hypertension No family hx of      Cerebrovascular Disease No family hx of      Glaucoma No family hx of      Macular Degeneration No family hx of      Breast Cancer No family hx of      Review of Systems - As per HPI and PMHx, otherwise 7 system review of the head and neck is negative.    Physical Exam  General - The patient is in no distress. Alert and oriented to person and place,  answers questions and cooperates with examination appropriately.   Neurologic - CN II-XII are grossly intact. No focal neurologic deficits.   Voice and Breathing - The patient was breathing comfortably without the use of accessory muscles. There was no wheezing, stridor, or stertor.  The patients voice was clear and strong.  Eyes - Extraocular movements intact. Sclera were not icteric or injected, conjunctiva were pink and moist.  Mouth - Examination of the oral cavity showed pink, healthy oral mucosa. No lesions or ulcerations noted.  The tongue was mobile and midline, and the dentition were in good condition.    Throat - The walls of the oropharynx were smooth, pink, moist, symmetric, and had no lesions or ulcerations. The tonsillar pillars and soft palate were symmetric.  The uvula was midline on elevation. She has tonsil stones on the right, one small one. Tonsils 1+ endophytic and cryptic.   Neck -  Soft, non-tender. Palpation of the occipital, submental, submandibular, internal jugular chain, and supraclavicular nodes did not demonstrate any abnormal lymph nodes or masses. No parotid masses. Palpation of the thyroid was soft and smooth, with no nodules or goiter appreciated.  The trachea was mobile and midline.  Nose - External contour is symmetric, no gross deflection or scars.  Nasal mucosa is pink and moist with clear mucus. The turbinates are normal in size. The septum was midline and non-obstructive.  No polyps, masses, or purulence noted on examination.      A/P - Lalitha Dey is a 37 year old female with phantom smell in which she intermittently smells smoke.  I recommend MRI brain to rule out any olfactory or other intracranial lesions.  Possible she is having foul smell from a sinus or throat issue.  MRI brain to look at the sinuses to rule out chronic sinusitis.  She did have tonsil stones on today's exam and she was not aware that.  She could try some self cleaning with a Waterpik and is possible  she is smelling these in her brain is interpreting this is a smoke smell.  Acid reflux is possible but less likely.  I will call her with MRI results and neck steps.    Beto Patel MD  Otolaryngology  [facility]        Again, thank you for allowing me to participate in the care of your patient.        Sincerely,        Beto Patel MD

## 2023-03-10 NOTE — TELEPHONE ENCOUNTER
Routing to provider to review and advise. Patient has read message, she is wanting to know if there would be harm to her getting the vaccine again as she believes she received it in the past.     Chio Hearn RN    [facility]

## 2023-03-12 LAB
HBV SURFACE AB SERPL IA-ACNC: 0.55 M[IU]/ML
HBV SURFACE AB SERPL IA-ACNC: NONREACTIVE M[IU]/ML

## 2023-03-13 ENCOUNTER — ANCILLARY PROCEDURE (OUTPATIENT)
Dept: MRI IMAGING | Facility: CLINIC | Age: 38
End: 2023-03-13
Attending: OTOLARYNGOLOGY
Payer: COMMERCIAL

## 2023-03-13 DIAGNOSIS — R44.2 PHANTOSMIA: ICD-10-CM

## 2023-03-13 PROCEDURE — 70553 MRI BRAIN STEM W/O & W/DYE: CPT | Mod: GC | Performed by: RADIOLOGY

## 2023-03-13 PROCEDURE — A9585 GADOBUTROL INJECTION: HCPCS | Performed by: RADIOLOGY

## 2023-03-13 RX ORDER — GADOBUTROL 604.72 MG/ML
7.5 INJECTION INTRAVENOUS ONCE
Status: COMPLETED | OUTPATIENT
Start: 2023-03-13 | End: 2023-03-13

## 2023-03-13 RX ADMIN — GADOBUTROL 6 ML: 604.72 INJECTION INTRAVENOUS at 10:58

## 2023-04-14 ENCOUNTER — TRANSFERRED RECORDS (OUTPATIENT)
Dept: HEALTH INFORMATION MANAGEMENT | Facility: CLINIC | Age: 38
End: 2023-04-14
Payer: COMMERCIAL

## 2023-05-18 ENCOUNTER — TELEPHONE (OUTPATIENT)
Dept: FAMILY MEDICINE | Facility: CLINIC | Age: 38
End: 2023-05-18
Payer: COMMERCIAL

## 2023-05-18 NOTE — TELEPHONE ENCOUNTER
Reason for Call:  Appointment Request    Patient requesting this type of appt:  Pain/ilness    Requested provider: Kiara Morelos    Reason patient unable to be scheduled: Not within requested timeframe    When does patient want to be seen/preferred time: 3-7 days    Comments: discomfort/pain/discharge in left breast     Could we send this information to you in AesRxt or would you prefer to receive a phone call?:   Patient would like to be contacted via AesRxt    Call taken on 5/18/2023 at 4:26 PM by Mick Elise V

## 2023-05-26 ENCOUNTER — OFFICE VISIT (OUTPATIENT)
Dept: FAMILY MEDICINE | Facility: CLINIC | Age: 38
End: 2023-05-26
Payer: COMMERCIAL

## 2023-05-26 VITALS
SYSTOLIC BLOOD PRESSURE: 100 MMHG | TEMPERATURE: 98.6 F | DIASTOLIC BLOOD PRESSURE: 62 MMHG | HEIGHT: 65 IN | BODY MASS INDEX: 20.86 KG/M2 | OXYGEN SATURATION: 99 % | WEIGHT: 125.2 LBS | HEART RATE: 82 BPM | RESPIRATION RATE: 12 BRPM

## 2023-05-26 DIAGNOSIS — N64.4 MASTODYNIA: ICD-10-CM

## 2023-05-26 DIAGNOSIS — N64.52 DISCHARGE FROM LEFT NIPPLE: Primary | ICD-10-CM

## 2023-05-26 DIAGNOSIS — E06.3 HYPOTHYROIDISM DUE TO HASHIMOTO'S THYROIDITIS: ICD-10-CM

## 2023-05-26 LAB
PROLACTIN SERPL 3RD IS-MCNC: 8 NG/ML (ref 5–23)
TSH SERPL DL<=0.005 MIU/L-ACNC: 4.07 UIU/ML (ref 0.3–4.2)

## 2023-05-26 PROCEDURE — 99214 OFFICE O/P EST MOD 30 MIN: CPT | Mod: 25 | Performed by: FAMILY MEDICINE

## 2023-05-26 PROCEDURE — 90471 IMMUNIZATION ADMIN: CPT | Performed by: FAMILY MEDICINE

## 2023-05-26 PROCEDURE — 84146 ASSAY OF PROLACTIN: CPT | Performed by: FAMILY MEDICINE

## 2023-05-26 PROCEDURE — 84443 ASSAY THYROID STIM HORMONE: CPT | Performed by: FAMILY MEDICINE

## 2023-05-26 PROCEDURE — 90746 HEPB VACCINE 3 DOSE ADULT IM: CPT | Performed by: FAMILY MEDICINE

## 2023-05-26 PROCEDURE — 36415 COLL VENOUS BLD VENIPUNCTURE: CPT | Performed by: FAMILY MEDICINE

## 2023-05-26 ASSESSMENT — PAIN SCALES - GENERAL: PAINLEVEL: MILD PAIN (2)

## 2023-05-26 NOTE — NURSING NOTE
Prior to immunization administration, verified patients identity using patient s name and date of birth. Please see Immunization Activity for additional information.     Screening Questionnaire for Adult Immunization    Are you sick today?   No   Do you have allergies to medications, food, a vaccine component or latex?   No   Have you ever had a serious reaction after receiving a vaccination?   No   Do you have a long-term health problem with heart, lung, kidney, or metabolic disease (e.g., diabetes), asthma, a blood disorder, no spleen, complement component deficiency, a cochlear implant, or a spinal fluid leak?  Are you on long-term aspirin therapy?   No   Do you have cancer, leukemia, HIV/AIDS, or any other immune system problem?   No   Do you have a parent, brother, or sister with an immune system problem?   No   In the past 3 months, have you taken medications that affect  your immune system, such as prednisone, other steroids, or anticancer drugs; drugs for the treatment of rheumatoid arthritis, Crohn s disease, or psoriasis; or have you had radiation treatments?   No   Have you had a seizure, or a brain or other nervous system problem?   No   During the past year, have you received a transfusion of blood or blood    products, or been given immune (gamma) globulin or antiviral drug?   No   For women: Are you pregnant or is there a chance you could become       pregnant during the next month?   No   Have you received any vaccinations in the past 4 weeks?   No     Immunization questionnaire answers were all negative.      Injection of HEP B given by Dee Dee Mccurdy MA. Patient instructed to remain in clinic for 15 minutes afterwards, and to report any adverse reactions.     Screening performed by Dee Dee Mccurdy MA on 5/26/2023 at 7:35 AM.

## 2023-05-26 NOTE — RESULT ENCOUNTER NOTE
Glen Doty,  Your recent labs showed normal results for thyroid functions and prolactin, both of which are reassuring  Kiara Morelos MD

## 2023-05-26 NOTE — PROGRESS NOTES
Assessment & Plan     Discharge from left nipple  ddx-idiopathic, underlying hypothyroidism/screen for prolactinoma/?  Other breast conditions  Reviewed diagnostic mammogram and left breast ultrasound from September 2022  We will check thyroid and prolactin levels today  Given the associated mastodynia, will get a left breast ultrasound  No diagnostic mammogram was ordered unless indicated by radiologist given the last one less than a year ago  - Prolactin; Future  - TSH with free T4 reflex; Future  - US Breast Left Complete 4 Quadrants; Future    Mastodynia  as above    - US Breast Left Complete 4 Quadrants; Future    Hypothyroidism due to Hashimoto's thyroiditis  Is currently on levothyroxine 50 mcg daily  - TSH with free T4 reflex; Future    Review of the result(s) of each unique test - TSH, left breast ultrasound and diagnostic mammogram on both sides         Chart documentation done in part with Dragon Voice recognition Software. Although reviewed after completion, some word and grammatical error may remain.    See Patient Instructions    Kiara Morelos MD  [facility]   Lalitha is a 37 year old, presenting for the following health issues:  Patient with past medical history significant for hypothyroidism is here with concerns of having milky discharge from the left nipple associated with pain in the left breast and in the left axillary area for the past 8 to 9 days  Patient also felt a sensation of tingling in the left upper chest wall and left axilla  Denies neck pain, thoracic back pain, abnormal skin rashes  Denies right-sided symptoms, previous similar symptoms patient did have breast pain and lumps in the past,  , Had imaging done including the last one in September 2022 showing dense breast tissue from left breast ultrasound and bilateral diagnostic mammogram  Patient has a 4.5-year-old son, last time she breast-fed was 2 and half years ago  LMP-today  No concerns  "for dizziness, headache, vision symptoms reported  Patient had history of COVID 2 weeks ago  Denies current concerns for cough, chest wall pain, chest pressure shortness of breath, wheezing    Breast Pain (Left breast discomfort & tingling with discharge started 5/18/23.)         View : No data to display.              HPI             Review of Systems   CONSTITUTIONAL: NEGATIVE for fever, chills, change in weight  EYES: NEGATIVE for vision changes or irritation  RESP: NEGATIVE for significant cough or SOB  CV: NEGATIVE for chest pain, palpitations or peripheral edema  MUSCULOSKELETAL: NEGATIVE for significant arthralgias or myalgia  NEURO: NEGATIVE for weakness, dizziness or paresthesias  ENDOCRINE: NEGATIVE for temperature intolerance, skin/hair changes and Hx thyroid disease  HEME/ALLERGY/IMMUNE: NEGATIVE for bleeding problems  PSYCHIATRIC: NEGATIVE for changes in mood or affect      Objective    /62 (BP Location: Right arm, Patient Position: Sitting, Cuff Size: Adult Regular)   Pulse 82   Temp 98.6  F (37  C) (Oral)   Resp 12   Ht 1.638 m (5' 4.5\")   Wt 56.8 kg (125 lb 3.2 oz)   LMP 05/26/2023 (Exact Date)   SpO2 99%   BMI 21.16 kg/m    Body mass index is 21.16 kg/m .  Physical Exam   GENERAL: healthy, alert and no distress  RESP: lungs clear to auscultation - no rales, rhonchi or wheezes  BREAST: Right-normal without masses, tenderness or nipple discharge and no palpable axillary masses or adenopathy  BREAST: Left-minimal-moderate tenderness in the left outer breast and left axilla, no axillary adenopathy noted, no nipple discharge seen today, left breast-felt intense compared to the right  CV: regular rate and rhythm, normal S1 S2, no S3 or S4, no murmur, click or rub, no peripheral edema and peripheral pulses strong  SKIN: no suspicious lesions or rashes  PSYCH: mentation appears normal, affect normal/bright    Labs and imaging ordered                "

## 2023-06-09 ENCOUNTER — ANCILLARY PROCEDURE (OUTPATIENT)
Dept: MAMMOGRAPHY | Facility: CLINIC | Age: 38
End: 2023-06-09
Attending: FAMILY MEDICINE
Payer: COMMERCIAL

## 2023-06-09 ENCOUNTER — ANCILLARY PROCEDURE (OUTPATIENT)
Dept: ULTRASOUND IMAGING | Facility: CLINIC | Age: 38
End: 2023-06-09
Attending: FAMILY MEDICINE
Payer: COMMERCIAL

## 2023-06-09 DIAGNOSIS — N64.4 MASTODYNIA: ICD-10-CM

## 2023-06-09 DIAGNOSIS — N64.52 DISCHARGE FROM LEFT NIPPLE: ICD-10-CM

## 2023-06-09 PROCEDURE — 77065 DX MAMMO INCL CAD UNI: CPT | Mod: LT | Performed by: RADIOLOGY

## 2023-06-09 PROCEDURE — 76642 ULTRASOUND BREAST LIMITED: CPT | Mod: LT | Performed by: RADIOLOGY

## 2023-06-09 PROCEDURE — G0279 TOMOSYNTHESIS, MAMMO: HCPCS | Performed by: RADIOLOGY

## 2023-06-12 ENCOUNTER — VIRTUAL VISIT (OUTPATIENT)
Dept: ONCOLOGY | Facility: CLINIC | Age: 38
End: 2023-06-12
Attending: GENETIC COUNSELOR, MS
Payer: COMMERCIAL

## 2023-06-12 DIAGNOSIS — Z83.719 FAMILY HISTORY OF COLONIC POLYPS: Primary | ICD-10-CM

## 2023-06-12 DIAGNOSIS — Z80.9 FAMILY HISTORY OF CANCER: ICD-10-CM

## 2023-06-12 DIAGNOSIS — Z80.52 FAMILY HISTORY OF BLADDER CANCER: ICD-10-CM

## 2023-06-12 PROCEDURE — 96040 HC GENETIC COUNSELING, EACH 30 MINUTES: CPT | Mod: GT,95 | Performed by: GENETIC COUNSELOR, MS

## 2023-06-12 NOTE — PROGRESS NOTES
"Virtual Visit Details    Type of service:  Video Visit     Originating Location (pt. Location): Home  Distant Location (provider location):  Off-site  Platform used for Video Visit: [employer]   Time spent over video: 70 minutes    6/12/2023    Referring Provider: Kiara Morelos MD    Presenting Information:   I spoke with Lalitha Dey over video today for genetic counseling to discuss her family history of cancer. This appointment was conducted virtually due to COVID-19 precautions. We talked today to review this history, cancer screening recommendations, and available genetic testing options.    Personal History:  Lalitha is a 38 year old female. She does not have any personal history of cancer.     She had her first menstrual period at age 14, her first child at age 33, and is premenopausal. Lalitha has her ovaries, one fallopian tube, and uterus in place. She underwent left salpingectomy on 11/28/17 due to ectopic pregnancy. She reports that she has not used hormone replacement therapy. She has used oral contraceptives. She had a left breast diagnostic mammogram and ultrasound on 6/9/23 due to left nipple discharge, which was negative. Her first colonoscopy on 4/14/23 detected two tubular adenomas and follow-up was recommended in 5 years. She had a lipoma removed from her upper back in 2012. She also notes that she has had several abnormal moles removed (no skin cancers) and is having her dermatology exams every 6 months. She will send her outside dermatology records for review. Lalitha reported former tobacco use and alcohol use of 2 drinks per week.    Family History: (Please see scanned pedigree for detailed family history information)  Maternal:    Her mother is 62 years old with no known history of cancer. She has had between 10-19 colon polyps (Lalitha thinks closer to 19), starting at age 45.    Her grandmother was diagnosed with a \"rare form\" of bladder cancer in her 60s and passed away in her 60s. She was a " non-smoker. She lived in [place] during the atomic bombings, although Lalitha reports that her family members have reported that her bladder cancer was not thought to be due to her radiation exposure.    Her mother (Lalitha's great-grandmother) passed away at age 67 due to a rare type of cancer (she does not have more information about this today).   Paternal:    Her father had a history of 10-19 colon polyps. He passed away at age 42 due to non-cancer reasons.    Her aunt is in her late 60s and has lung cancer and possibly also breast cancer. She is currently having treatments. She has a history of smoking.    Her maternal ethnicity is [place], [place], [place]. Her paternal ethnicity is [place]. There is no known [employer] ancestry on either side of her family.     Discussion:    We reviewed the features of sporadic, familial, and hereditary cancers. In looking at Lalitha's family history, it is possible that a cancer susceptibility gene is present due to her family history of colon polyps in her parents, as well as a few different cancer types in her relatives. Lalitha has also had two adenomatous colon polyps herself at age 37.    We discussed that it would be helpful for her to see if she can obtain additional information from her mother about the number of colon polyps she has had, the number of colon polyps in her father if possible, and more information about the cancers in her family.    We discussed the natural history and genetics of hereditary cancer. We discussed that there are multiple genes in which mutations cause increased risk for colon polyps, bladder cancer, and other cancers. A detailed handout regarding genes associated with an increased risk for colon polyps/cancer and the information we discussed will be provided to Lalitha via [employer] and can be found in the after visit summary. Topics included: inheritance pattern, cancer risks, cancer screening recommendations, and also risks, benefits and  limitations of testing.    We briefly reviewed genetic testing options for Lalitha based on her personal and family history: a panel of genes associated with an increased risk for certain cancers, or larger panel options to include genes associated with increased risk for multiple different cancer types.     Medical Management: For Lalitha, we reviewed that the information from genetic testing may determine:    additional cancer screening for which Lalitha may qualify (i.e. mammogram and breast MRI, more frequent colonoscopies, more frequent dermatologic exams, etc.),    options for risk reducing surgeries Lalitha could consider (i.e. bilateral mastectomy, surgery to remove her ovaries and/or uterus, etc.),      and targeted chemotherapies if she were to develop certain cancers in the future (i.e. immunotherapy for individuals with Wang syndrome, PARP inhibitors, etc.).   At the end of our discussion today, Lalitha elected to first talk more with her family members to see if she can obtain some additional information about the polyps/cancers in the family. She will contact me once she has additional information so that we can further discuss genetic testing options or review appropriate cancer screening recommendations based on her family history.     Plan:  1) No genetic testing was ordered today. Lalitha elected to first talk more with her family members to see if she can obtain some additional information about the polyps/cancers in the family.  2) She will contact me once she has additional information so we can review either next steps for genetic testing or appropriate screening recommendations based on her family history.  3) She was also encouraged to contact me with additional questions, or if she is unable to find out more information from her relatives.     Fabby Benoit MS, [facility]  Licensed, Certified Genetic Counselor  Office: [phone]  Email: [email or web address]

## 2023-06-12 NOTE — LETTER
6/12/2023         RE: Lalitha Dey  [id number] [address] [place] [id number]        Dear Colleague,    Thank you for referring your patient, Lalitha Dey, to the [facility]. Please see a copy of my visit note below.    Virtual Visit Details    Type of service:  Video Visit     Originating Location (pt. Location): Home  Distant Location (provider location):  Off-site  Platform used for Video Visit: [facility]   Time spent over video: 70 minutes    6/12/2023    Referring Provider: Kiara Morelos MD    Presenting Information:   I spoke with Lalitha Dey over video today for genetic counseling to discuss her family history of cancer. This appointment was conducted virtually due to COVID-19 precautions. We talked today to review this history, cancer screening recommendations, and available genetic testing options.    Personal History:  Lalitha is a 38 year old female. She does not have any personal history of cancer.     She had her first menstrual period at age 14, her first child at age 33, and is premenopausal. Lalitha has her ovaries, one fallopian tube, and uterus in place. She underwent left salpingectomy on 11/28/17 due to ectopic pregnancy. She reports that she has not used hormone replacement therapy. She has used oral contraceptives. She had a left breast diagnostic mammogram and ultrasound on 6/9/23 due to left nipple discharge, which was negative. Her first colonoscopy on 4/14/23 detected two tubular adenomas and follow-up was recommended in 5 years. She had a lipoma removed from her upper back in 2012. She also notes that she has had several abnormal moles removed (no skin cancers) and is having her dermatology exams every 6 months. She will send her outside dermatology records for review. Lalitha reported former tobacco use and alcohol use of 2 drinks per week.    Family History: (Please see scanned pedigree for detailed family history information)  Maternal:  Her  "mother is 62 years old with no known history of cancer. She has had between 10-19 colon polyps (Lalitha thinks closer to 19), starting at age 45.  Her grandmother was diagnosed with a \"rare form\" of bladder cancer in her 60s and passed away in her 60s. She was a non-smoker. She lived in [place] during the atomic bombings, although Lalitha reports that her family members have reported that her bladder cancer was not thought to be due to her radiation exposure.  Her mother (Lalitha's great-grandmother) passed away at age 67 due to a rare type of cancer (she does not have more information about this today).   Paternal:  Her father had a history of 10-19 colon polyps. He passed away at age 42 due to non-cancer reasons.  Her aunt is in her late 60s and has lung cancer and possibly also breast cancer. She is currently having treatments. She has a history of smoking.    Her maternal ethnicity is Maldivian, Romanian, [place]. Her paternal ethnicity is Macanese. There is no known [employer]azi [employer] ancestry on either side of her family.     Discussion:  We reviewed the features of sporadic, familial, and hereditary cancers. In looking at Lalitha's family history, it is possible that a cancer susceptibility gene is present due to her family history of colon polyps in her parents, as well as a few different cancer types in her relatives. Lalitha has also had two adenomatous colon polyps herself at age 37.  We discussed that it would be helpful for her to see if she can obtain additional information from her mother about the number of colon polyps she has had, the number of colon polyps in her father if possible, and more information about the cancers in her family.  We discussed the natural history and genetics of hereditary cancer. We discussed that there are multiple genes in which mutations cause increased risk for colon polyps, bladder cancer, and other cancers. A detailed handout regarding genes associated with an increased risk for colon " polyps/cancer and the information we discussed will be provided to Lalitha via [employer] and can be found in the after visit summary. Topics included: inheritance pattern, cancer risks, cancer screening recommendations, and also risks, benefits and limitations of testing.  We briefly reviewed genetic testing options for Lalitha based on her personal and family history: a panel of genes associated with an increased risk for certain cancers, or larger panel options to include genes associated with increased risk for multiple different cancer types.   Medical Management: For Lalitha, we reviewed that the information from genetic testing may determine:  additional cancer screening for which Lalitha may qualify (i.e. mammogram and breast MRI, more frequent colonoscopies, more frequent dermatologic exams, etc.),  options for risk reducing surgeries Lalitha could consider (i.e. bilateral mastectomy, surgery to remove her ovaries and/or uterus, etc.),    and targeted chemotherapies if she were to develop certain cancers in the future (i.e. immunotherapy for individuals with Wang syndrome, PARP inhibitors, etc.).   At the end of our discussion today, Lalitha elected to first talk more with her family members to see if she can obtain some additional information about the polyps/cancers in the family. She will contact me once she has additional information so that we can further discuss genetic testing options or review appropriate cancer screening recommendations based on her family history.     Plan:  1) No genetic testing was ordered today. Lalitha elected to first talk more with her family members to see if she can obtain some additional information about the polyps/cancers in the family.  2) She will contact me once she has additional information so we can review either next steps for genetic testing or appropriate screening recommendations based on her family history.  3) She was also encouraged to contact me with additional questions,  or if she is unable to find out more information from her relatives.     Fabby Benoit MS, [facility]  Licensed, Certified Genetic Counselor  Office: [phone]  Email: [email or web address]

## 2023-06-12 NOTE — NURSING NOTE
Is the patient currently in the state of [place]? YES    Visit mode:VIDEO    If the visit is dropped, the patient can be reconnected by: VIDEO VISIT: Text to cell phone: [phone]    Will anyone else be joining the visit? NO      How would you like to obtain your AVS? MyChart    Are changes needed to the allergy or medication list? NO    Reason for visit: Consult      [facility]

## 2023-06-16 NOTE — PATIENT INSTRUCTIONS
Assessing Cancer Risk  Only about 5-10% of cancers are thought to be due to an inherited cancer susceptibility gene.    These families often have:  Several people with the same or related types of cancer  Cancers diagnosed at a young age (before age 50)  Individuals with more than one primary cancer  Multiple generations of the family affected with cancer    Comprehensive Colon Cancer Panel  We each inherit two copies of every gene in our bodies: one from our mother, and one from our father. Each gene has a specific job to do. When a gene has a mistake or  mutation  in it, it does not work like it should.      This handout will review common hereditary colon cancer syndromes, and other genes related to an increased risk for colon cancer.  The genes that will be discussed in this handout are: APC, BMPR1A, CDH1, CHEK2, EPCAM, GREM1, MLH1, MSH2, MSH6, MUTYH, PMS2, POLD1, POLE, PTEN, SMAD4, STK11, and TP53.  These genes are clinically actionable, meaning there are published guidelines for cancer screening and management for individuals who are found to carry mutations in these genes. Inheriting a mutation does not mean a person will develop cancer, but it does significantly increase his or her risk above the general population risk.      Familial Adenomatous Polyposis (FAP)  FAP is a hereditary cancer syndrome caused by mutations in the APC gene. The condition is known to cause hundreds to thousands of adenomatous polyps in the colon, creating a  carpet  of polyps. Some individuals have what is called attenuated FAP (AFAP), a milder form of FAP with fewer polyps and typically later onset. Individuals with an APC gene mutation are at an increased risk for colon, thyroid, and duodenal cancers, as well as several other types of cancer1.  Other features of this condition may include: osteomas, dental anomalies, benign skin lesions, CHRPE ( freckle  on the inside of the eye), and desmoid tumors.      Lifetime Cancer Risks     Cancer Type General Population FAP   Colon  4.1% near 100%   Thyroid (papillary) 1.2% Up to 12%   Duodenal <1% Up to 10%   Liver (hepatoblastoma)  <1% Up to 2.5% before age 5   Pancreas 1.7% Up to 2%   Stomach <1% Up to 7.1%       Juvenile Polyposis Syndrome (JPS)  JPS is characterized by hamartomatous polyps, called juvenile polyps, in the gastrointestinal tract.  Juvenile  refers to the type of polyps seen in this hereditary cancer condition, not the age of onset. Currently, mutations in two genes are known to cause JPS: BMPR1A and SMAD4. Of individuals clinically diagnosed with JPS, only 40% have an identifiable mutation in one of these genes, suggesting there are other genes that cause JPS that have not been discovered yet. Individuals with JPS are at an increased risk for colon cancer and stomach cancer 2,3,4. Pancreatic and small bowel cancers have also been reported in JPS, but the actual risks are unknown.         Lifetime Cancer Risks   Cancer Type General Population JPS   Colon 4.1% 40-50%   Gastric/Duodenal <1% Up to 21%     Some individuals with SMAD4 mutations have a condition called JPS/HHT (Juvenile Polyposis/Hereditary Hemorrhagic Telangiectasia) where in addition to JPS, individuals may have nose bleeds and clotting issues.     Hereditary Diffuse Gastric Cancer (HDGC)  Currently, mutations in one gene are known to cause Hereditary Diffuse Gastric Cancer: CDH1.  Individuals with HDGC are at increased risk for diffuse gastric cancer and lobular breast cancer. Of people diagnosed with HDGC, 30-50% have a mutation in the CDH1 gene.  This suggests there are likely mutations in other genes that may cause HDGC that have not been identified yet. Individuals with HDGC may also be at increased risk for colon cancer.      Lifetime Cancer Risks   Cancer Type General Population HDGC   Diffuse Gastric <1% 67-83%   Breast 12% 41-60%     Wang syndrome  Mutations in five different genes are known to cause Wang  syndrome: MLH1, MSH2, MSH6, PMS2, and EPCAM. Individuals with Wang syndrome have an increased risk for colon, uterine, ovarian, small bowel, stomach, urinary tract, and brain cancer, as well as several other types of cancer. The exact lifetime cancer risks are dependent upon the gene in which the mutation was identified.      Lifetime Cancer Risks   Cancer Type General Population Wang syndrome   Colon 5% Up to 61%   Uterine 2-3% Up to 57%   Stomach <1% Up to 9%   Ovarian 2% Up to 38%   Urinary tract <1% Up to 28%   Hepatobiliary tract <1% Up to 3.7%   Small bowel <1% Up to 11%   Brain/CNS <1% Up to 7.7%   Pancreas <1% Up to 6.2%       MUTYH-Associated Polyposis (MAP)  MAP is a hereditary cancer syndrome caused by mutations in the MUTYH gene. Unlike the other hereditary cancer genes discussed in this handout, two mutations in the MUTYH gene cause MAP and increase cancer risk. Those affected with MAP typically have between  adenomatous polyps. This syndrome also increases the risk for colon and duodenal cancer. Current research suggests that other cancers may be associated with MUTYH mutations, as well. The table below includes the risk that someone with two MUTYH gene mutations would develop cancer in their lifetime; of note, there is also an increased colon cancer risk for individuals who carry only one MUTYH gene mutation5,6,7.       Lifetime Cancer Risks   Cancer Type General Population MAP   Colon 5% 70-90%   Duodenal  <1% 4%       Cowden syndrome  Cowden syndrome is a hereditary condition that increases the risk for breast, thyroid, endometrial, colon, and kidney cancer.  A single mutation in the PTEN gene causes Cowden syndrome and increases cancer risk.  The table below shows the chance that someone with a PTEN mutation would develop cancer in their lifetime8,9.  Other benign features seen in some individuals with Cowden syndrome include benign skin lesions (facial papules, keratoses, lipomas),  learning disabilities, autism, thyroid nodules, hamartomatous colon polyps, and larger head size.      Lifetime Cancer Risks   Cancer Type General Population Cowden Syndrome   Breast 12% 40-60%*   Thyroid 1% 35%   Renal 1-2% 34%   Endometrial 2-3% 28%   Colon 4.1% 9%   Melanoma 2-3% Up to 6%     *One recent study found breast cancer risk to be increased to 85%    Peutz-Jeghers syndrome (PJS)  PJS is a hereditary cancer syndrome caused by mutations in the STK11 gene. This condition can be distinguished from other hereditary syndromes by the presence of hamartomatous polyps in the gastrointestinal tract and freckles present in unusual places such as the hands, feet, neck, and lips. Individuals with Peutz-Jeghers syndrome have an increased risk for colon, breast, pancreatic, and other cancers3.  Men are at risk for testicular tumors which can affect hormones in the body. Women are at risk for sex cord tumors of the ovaries and a rare aggressive type of cervical cancer.     Lifetime Cancer Risks   Cancer Type General Population PJS   Breast 12% 32-54%   Colon 4.1% 39%   Stomach <1% 29%   Pancreas 1.5% 11-36%   Small Intestine <1% 13%   Ovarian  <2%  >20%   Lung 6-7% 7-17%     Additional Genes Associated with Increased Colon Cancer Risk  CHEK2  CHEK2 is a moderate-risk breast cancer gene.  Women who have a mutation in CHEK2 have around a 2-fold increased risk for breast cancer compared to the general population, and this risk may be higher depending upon family history.12,13,14. Mutations in CHEK2 have also been shown to increase the risk of a number of other cancers, including colon and prostate, however these cancer risks are currently not well understood.     GREM1  GREM1 is a moderate-risk colon polyposis gene. Duplications of this gene are more commonly found in individuals with [employer] [id number]. Mutations in GREM1 are associated with colon polyps and therefore an increased risk of colon cancer; however  the estimated cancer risk is not well [id number].     POLD1 and POLE  POLD1 and POLE are moderate-risk colon cancer genes. Carriers of a mutation in one of these genes increases the lifetime risk of colorectal aerrfl08,18,19,20. Mutations in these genes may also be associated with increased risk for other cancers including: endometrial cancer, duodenal adenomas and carcinomas, and brain tumors.    TP53  Li Fraumeni syndrome (LFS) is a hereditary cancer predisposition syndrome. LFS is caused by a mutation in the TP53 gene. A single mutation in one of the copies of TP53 increases the risk for multiple cancers. Individuals with LFS are at an increased risk for developing cancer at a young age. The general lifetime risk for development of cancer is 50% by age 30 and 90% by age 60.      Core Cancers: Sarcomas, Breast, Brain, Lung, Leukemias/Lymphomas, Adrenocortical carcinomas  Other Cancers: Gastrointestinal, Thyroid, Skin, Genitourinary    Genetic Testing  Genetic testing involves a simple blood test and will look at the genetic information in genes associated with an increased risk of colon cancer. The tests look for any harmful mutations that are associated with increased cancer risk.  If possible, it is recommended that the person(s) who has had cancer be tested before other family members.  That person will give us the most useful information about whether or not a specific gene mutation is associated with the cancer in the family.     Results  There are three possible results from genetic testing:  Positive--a harmful mutation was identified  Negative--no mutation was identified  Variant of unknown significance--a variation in one of the genes was identified, but it is unclear how this impacts cancer risk in the family  Advantages and Disadvantages  There are advantages and disadvantages to genetic testing of these genes.    Advantages  May clarify your cancer risk  Can help you make medical decisions  May  explain the cancers in your family  May give useful information to your family members (if you share your results)    Disadvantages  Possible negative emotional impact of learning about inherited cancer risk  Uncertainty in interpreting a negative test result in some situations  Possible genetic discrimination concerns (see below)    Inheritance   Most mutations in the genes outlined above are inherited in an autosomal dominant pattern.  This means that if a parent has a mutation, each of their children will have a 50% chance of inheriting that same mutation.  Therefore, each child--male or female--would have a 50% chance of being at increased risk for developing cancer.                                              Image obtained from [employer], 2013     In the case of MUTYH-Associated Polyposis (MAP) this hereditary cancer syndrome is inherited in an autosomal recessive pattern. This means that each parent of an individual with MAP is a carrier of MAP, meaning that they have only one mutation in MUTYH. They still have one functioning copy of their gene.  Carriers are at a slightly higher risk for colon cancer than the general population. If each parent is a carrier for MAP, they have a 25% of having a child who is affected with MAP, meaning the child inherited both gene mutations - one from each parent.       Image obtained from [employer], 2016    Genetic Information Nondiscrimination Act (BOBBY)  The Genetic Information Nondiscrimination Act of 2008 (BOBBY) is a federal law that protects individuals from health insurance or employment discrimination based on a genetic test result alone (with some exceptions, including employers with fewer than 15 employees, and ).  Although rare, BOBBY  does not cover discrimination protections in terms of life insurance, long term care, or disability insurances.  Visit the [employer] website to learn more. Visit the  National Human Genome Research [place] at Genome.gov/64023721 to learn more.    Reducing Cancer Risk  Each of the genes listed within this handout have nationally recognized cancer screening guidelines that would be recommended for individuals who test positive.  In addition to increased cancer screening, surgeries may be offered or recommended to reduce cancer risk in certain cases.  Recommendations are based upon an individual s genetic test result as well as their personal and family history of cancer.    Questions to Think About Regarding Genetic Testing  What effect will the test result have on me and my relationship with my family members if I have an inherited gene mutation?  If I don t have a gene mutation?  Should I share my test results, and how will my family react to this news, which may also affect them?  Are my children ready to learn new information that may one day affect their own health?    Resources    PTEN World [email or web address]   No Stomach for Cancer, Inc. nostomachforcancer.org   Stomach Cancer Relief Network scrnet.org   Collaborative Group of the Americas on Inherited Colorectal Cancer (CGA) cgaicc.com   Cancer Care cancercare.org   American Cancer Society (ACS) cancer.org   National Cancer [place] (NCI) cancer.org   Wang Syndrome International [email or web address]       Please call us if you have any questions or concerns.     Cancer Risk Management Program [phone] ([phone])  Raymundo Szymanski, MS [facility]  [phone]  Lucy Mayers, MS, [facility] [phone]  Marianne Olivarez, MS, [facility]  [phone]  Sera Baumann, MS, [facility]  [phone]  Fabby Benoit, MS, [facility]  [phone]  Germania Aragon, MS, [facility] [phone]  Nahed Santana, MS, [facility] [phone]    References    Patrick PERERA, Trinidad J, Ronald G, Pal E, Dolores J, et al. The Prevalence of thyroid cancer and benign thyroid disease in patients with familial adenomatous polyposis may be higher than previously recognized. Clin Colorectal Cancer.  2012;11:304-308.  Tiffany L, Haresh Medley A, Melanie L, Echo GILES, Glenis K, et al. Risk of colorectal cancer in juvenile polyposis. Gut. 2007;56:965-967.  Isaac FG, [place], Dano [place]. Colorectal cancer risk in hamartomatous polyposis syndromes. World Journal of Gastrointestinal Surgery. 2015;27:25-32  Miriam GONG. Guidance on gastrointestinal surveillance for hereditary non-polyposis colorectal cancer, familial adenomatous polypolis, juvenile polyposis, and Peutz-Jeghers syndrome. Gut. 2002;51:21-27.  Juan Carlos AK, Carlos PRISCILLA, Amanda JG et al. Risk of extracolonic cancers for people with biallelic and monoallelic mutations in MUTYH. Int J of Cancer. 2016;139:9933-3030.  Gilmar S, Lukas S, Lizbeth H, Nisa K, Michelle M, et al. MUTYH-associated polyposis: 70 of 71 patients with biallelic mutations present with an attenuated or atypical phenotype. Int J of Cancer. 2006;119:807-814.  Yanet G, Rusty F, Lira I, Pinchev M, [place] H, et al. MUTYH mutation carriers have increased breast cancer risk. Cancer. 2012;6753-2006.  Jasmeet MH, Dhruv J, Delroy J, Dawna LA, [place] MS, Eng C. Lifetime cancer risks in individuals with germline PTEN mutations. Clin Cancer Res. 2012;18:400-7.  Alana SANTACRUZ. Cowden Syndrome: A Critical Review of the Clinical Literature. J Day . 2009:18:13-27.  National Comprehensive Cancer Network. Clinical practice guidelines in oncology, colorectal cancer screening. Available online (registration required). 2013.  National Cancer [place]. SEER Cancer Stat Fact Sheets.  December 2013.  CHEK2 Breast Cancer Case-Control Consortium. CHEK2*1100delC and susceptibility to breast cancer: A collaborative analysis involving 10,860 breast cancer cases and 9,065 controls from 10 studies. Am J Hum Day, 74 (2004), pp. 6787-6430  Elmira T, Donis S, Carlos K, et al. Spectrum of Mutations in BRCA1, BRCA2, CHEK2, and TP53 in Families at High Risk of Breast Cancer. JAMAR.  2006;295(12):0738-5769.  Antonio GILES, Kaylah D, Aidan KENNEDY, et al. Risk of breast cancer in women with a CHEK2 mutation with and without a family history of breast cancer. J Clin Oncol. 2011;29:2874-5713.  Kimberley WAYNE, Hellen E, Suresh J, Bruce N, Mulu M et al. Defining the polyposis/colorectal cancer phenotype associated with the GREM1 duplication: counseling and management guidelines. Day .Res. 2016;98:1-5.  Brayan E, Ulises S, Michael KENNEDY, Penny Ware, et al. Hereditary mixed polyposis syndrome is caused by a 40kb upstream duplication that leads to increased and ectopic expression of the BMP antagonist GREM1. Afsaneh Day. 2015;44:699-703.  TISHA Bustillos. et al. Germline mutations affecting the proofreading domains of POLE and POLD1 predispose to colorectal adenomas and carcinomas. Afsaneh. Day. 45, 136-44 (2013).  ELEN Martin. et al. POLE and POLD1 mutations in 529 negrita with familial colorectal cancer and/or polyposis: review of reported cases and recommendations for genetic testing and surveillance. Day. Med. (2015). doi:10.1038/gim.2015.75  GAY Peters. et al. New insights into POLE and POLD1 germline mutations in familial colorectal cancer and polyposis. Hum. Mol. Day. 23, 0427-12 (2014).  CORTES Vicente. et al. Frequency and phenotypic spectrum of germline mutations in POLE and seven other polymerase genes in 266 patients with colorectal adenomas and carcinomas. Int. J. Cancer 137, 320-31 (2015).

## 2023-06-28 ENCOUNTER — ALLIED HEALTH/NURSE VISIT (OUTPATIENT)
Dept: FAMILY MEDICINE | Facility: CLINIC | Age: 38
End: 2023-06-28
Payer: COMMERCIAL

## 2023-06-28 DIAGNOSIS — Z23 NEED FOR VACCINATION: Primary | ICD-10-CM

## 2023-06-28 PROCEDURE — 99207 PR NO CHARGE NURSE ONLY: CPT

## 2023-06-28 PROCEDURE — 90471 IMMUNIZATION ADMIN: CPT

## 2023-06-28 PROCEDURE — 90746 HEPB VACCINE 3 DOSE ADULT IM: CPT

## 2023-06-28 NOTE — PROGRESS NOTES
Prior to immunization administration, verified patients identity using patient s name and date of birth. Please see Immunization Activity for additional information.     Screening Questionnaire for Adult Immunization    Are you sick today?   No   Do you have allergies to medications, food, a vaccine component or latex?   No   Have you ever had a serious reaction after receiving a vaccination?   No   Do you have a long-term health problem with heart, lung, kidney, or metabolic disease (e.g., diabetes), asthma, a blood disorder, no spleen, complement component deficiency, a cochlear implant, or a spinal fluid leak?  Are you on long-term aspirin therapy?   No   Do you have cancer, leukemia, HIV/AIDS, or any other immune system problem?   No   Do you have a parent, brother, or sister with an immune system problem?   No   In the past 3 months, have you taken medications that affect  your immune system, such as prednisone, other steroids, or anticancer drugs; drugs for the treatment of rheumatoid arthritis, Crohn s disease, or psoriasis; or have you had radiation treatments?   No   Have you had a seizure, or a brain or other nervous system problem?   No   During the past year, have you received a transfusion of blood or blood    products, or been given immune (gamma) globulin or antiviral drug?   No   For women: Are you pregnant or is there a chance you could become       pregnant during the next month?   No   Have you received any vaccinations in the past 4 weeks?   No     Immunization questionnaire answers were all negative.    I have reviewed the following standing orders:   This patient is due and qualifies for the Hepatitis B vaccine.    Click here for Hepatitis B Standing Order    I have reviewed the vaccines inclusion and exclusion criteria; No concerns regarding eligibility.         Patient instructed to remain in clinic for 15 minutes afterwards, and to report any adverse reactions.     Screening performed by  Yeny Chaney MA on 6/28/2023 at 11:57 AM.

## 2023-08-31 ENCOUNTER — VIRTUAL VISIT (OUTPATIENT)
Dept: FAMILY MEDICINE | Facility: CLINIC | Age: 38
End: 2023-08-31
Payer: COMMERCIAL

## 2023-08-31 DIAGNOSIS — J98.01 BRONCHOSPASM: Primary | ICD-10-CM

## 2023-08-31 PROCEDURE — 99213 OFFICE O/P EST LOW 20 MIN: CPT | Mod: VID | Performed by: INTERNAL MEDICINE

## 2023-08-31 RX ORDER — ALBUTEROL SULFATE 90 UG/1
2 AEROSOL, METERED RESPIRATORY (INHALATION) EVERY 6 HOURS PRN
Qty: 18 G | Refills: 0 | Status: SHIPPED | OUTPATIENT
Start: 2023-08-31 | End: 2023-09-22

## 2023-08-31 RX ORDER — INHALER, ASSIST DEVICES
SPACER (EA) MISCELLANEOUS
Qty: 1 EACH | Refills: 0 | Status: SHIPPED | OUTPATIENT
Start: 2023-08-31 | End: 2024-03-08

## 2023-08-31 ASSESSMENT — ENCOUNTER SYMPTOMS: COUGH: 1

## 2023-08-31 NOTE — PROGRESS NOTES
Lalitha is a 38 year old who is being evaluated via a billable video visit.      How would you like to obtain your AVS? MyChart  If the video visit is dropped, the invitation should be resent by: Text to cell phone: [phone]  Will anyone else be joining your video visit? No          Lalitha was seen today for cough.    Diagnoses and all orders for this visit:    Bronchospasm  -     albuterol (PROAIR HFA/PROVENTIL HFA/VENTOLIN HFA) 108 (90 Base) MCG/ACT inhaler; Inhale 2 puffs into the lungs every 6 hours as needed for shortness of breath, wheezing or cough  -     spacer (OPTICHAMBER ASIA) holding chamber; Optichamber or equivalent for inhaler use.     Follow up if no improve in 1 week. May need inhaled steroids short term.        Subjective   Lalitha is a 38 year old, presenting for the following health issues:  Cough    Cough    History of Present Illness       Reason for visit:  Lingering cough since 8/18. Negative Covid test. No other symptoms at this time.  Symptom onset:  1-2 weeks ago  Symptoms include:  Lingering cough/mucus cough  Symptom intensity:  Moderate  Symptom progression:  Staying the same  Had these symptoms before:  Yes  Has tried/received treatment for these symptoms:  Yes  Previous treatment was successful:  No  What makes it worse:  Being active/talking  What makes it better:  Cough drops/Cough medicine    She eats 2-3 servings of fruits and vegetables daily.She consumes 0 sweetened beverage(s) daily.She exercises with enough effort to increase her heart rate 9 or less minutes per day.  She exercises with enough effort to increase her heart rate 3 or less days per week.   She is taking medications regularly.     Some runny nose in the beginning.   Mucous in the cough. Not sleeping from coughing so much.  It is better than before but persisting. When she talks, she coughs.   No congestion.   Some shortness of breath with going up and down stairs.   No nasal congestion/headache.     Remote  history of asthma in childhood. No issues as an adult.   Son has asthma.     Review of Systems   Respiratory:  Positive for cough.             Objective           Vitals:  No vitals were obtained today due to virtual visit.    Physical Exam   GENERAL: Healthy, alert and no distress  EYES: Eyes grossly normal to inspection.  No discharge or erythema, or obvious scleral/conjunctival abnormalities.  RESP: intermittent dry cough.   SKIN: Visible skin clear. No significant rash, abnormal pigmentation or lesions.  NEURO: Cranial nerves grossly intact.  Mentation and speech appropriate for age.  PSYCH: Mentation appears normal, affect normal/bright, judgement and insight intact, normal speech and appearance well-groomed.            Video-Visit Details    Type of service:  Video Visit     Originating Location (pt. Location): Home    Distant Location (provider location):  Off-site  Platform used for Video Visit: [employer]

## 2023-09-22 DIAGNOSIS — J98.01 BRONCHOSPASM: ICD-10-CM

## 2023-09-22 RX ORDER — ALBUTEROL SULFATE 90 UG/1
AEROSOL, METERED RESPIRATORY (INHALATION)
Qty: 18 G | Refills: 0 | Status: SHIPPED | OUTPATIENT
Start: 2023-09-22 | End: 2024-03-08

## 2024-01-09 ENCOUNTER — TELEPHONE (OUTPATIENT)
Dept: FAMILY MEDICINE | Facility: CLINIC | Age: 39
End: 2024-01-09
Payer: COMMERCIAL

## 2024-01-11 ENCOUNTER — ALLIED HEALTH/NURSE VISIT (OUTPATIENT)
Dept: FAMILY MEDICINE | Facility: CLINIC | Age: 39
End: 2024-01-11
Payer: COMMERCIAL

## 2024-01-11 DIAGNOSIS — Z23 ENCOUNTER FOR IMMUNIZATION: Primary | ICD-10-CM

## 2024-01-11 PROCEDURE — 99207 PR NO CHARGE NURSE ONLY: CPT

## 2024-01-11 PROCEDURE — 90471 IMMUNIZATION ADMIN: CPT

## 2024-01-11 PROCEDURE — 90746 HEPB VACCINE 3 DOSE ADULT IM: CPT

## 2024-01-11 NOTE — PROGRESS NOTES
Prior to immunization administration, verified patients identity using patient s name and date of birth. Please see Immunization Activity for additional information.     Screening Questionnaire for Adult Immunization    Are you sick today?   No   Do you have allergies to medications, food, a vaccine component or latex?   No   Have you ever had a serious reaction after receiving a vaccination?   No   Do you have a long-term health problem with heart, lung, kidney, or metabolic disease (e.g., diabetes), asthma, a blood disorder, no spleen, complement component deficiency, a cochlear implant, or a spinal fluid leak?  Are you on long-term aspirin therapy?   No   Do you have cancer, leukemia, HIV/AIDS, or any other immune system problem?   No   Do you have a parent, brother, or sister with an immune system problem?   No   In the past 3 months, have you taken medications that affect  your immune system, such as prednisone, other steroids, or anticancer drugs; drugs for the treatment of rheumatoid arthritis, Crohn s disease, or psoriasis; or have you had radiation treatments?   No   Have you had a seizure, or a brain or other nervous system problem?   No   During the past year, have you received a transfusion of blood or blood    products, or been given immune (gamma) globulin or antiviral drug?   No   For women: Are you pregnant or is there a chance you could become       pregnant during the next month?   No   Have you received any vaccinations in the past 4 weeks?   No     Immunization questionnaire answers were all negative.    I have reviewed the following standing orders:   This patient is due and qualifies for the Hepatitis B vaccine.    Click here for Hepatitis B Standing Order    I have reviewed the vaccines inclusion and exclusion criteria; No concerns regarding eligibility.     Patient instructed to remain in clinic for 15 minutes afterwards, and to report any adverse reactions.     Screening performed by Yeny  SAYRA Chaney on 1/11/2024 at 11:29 AM.

## 2024-02-14 ENCOUNTER — TELEPHONE (OUTPATIENT)
Dept: FAMILY MEDICINE | Facility: CLINIC | Age: 39
End: 2024-02-14
Payer: COMMERCIAL

## 2024-02-14 NOTE — TELEPHONE ENCOUNTER
Patient Quality Outreach    Patient is due for the following:   Cervical Cancer Screening - PAP Needed      Topic Date Due    HPV Vaccine (2 - 3-dose series) 06/20/2008    COVID-19 Vaccine (4 - 2023-24 season) 09/01/2023       Next Steps:   Patient has upcoming appointment, these items will be addressed at that time.    Type of outreach:    Chart review performed, no outreach needed.      Questions for provider review:    None           Sandy Melchor

## 2024-03-08 ENCOUNTER — OFFICE VISIT (OUTPATIENT)
Dept: FAMILY MEDICINE | Facility: CLINIC | Age: 39
End: 2024-03-08
Payer: COMMERCIAL

## 2024-03-08 VITALS
TEMPERATURE: 98.6 F | BODY MASS INDEX: 20.37 KG/M2 | WEIGHT: 119.3 LBS | DIASTOLIC BLOOD PRESSURE: 63 MMHG | HEIGHT: 64 IN | OXYGEN SATURATION: 100 % | RESPIRATION RATE: 12 BRPM | HEART RATE: 78 BPM | SYSTOLIC BLOOD PRESSURE: 99 MMHG

## 2024-03-08 DIAGNOSIS — N64.52 DISCHARGE FROM LEFT NIPPLE: ICD-10-CM

## 2024-03-08 DIAGNOSIS — R00.2 PALPITATIONS: ICD-10-CM

## 2024-03-08 DIAGNOSIS — Z00.00 ROUTINE GENERAL MEDICAL EXAMINATION AT A HEALTH CARE FACILITY: Primary | ICD-10-CM

## 2024-03-08 DIAGNOSIS — E03.9 ACQUIRED HYPOTHYROIDISM: ICD-10-CM

## 2024-03-08 DIAGNOSIS — N64.4 MASTODYNIA: ICD-10-CM

## 2024-03-08 DIAGNOSIS — E06.3 HYPOTHYROIDISM DUE TO HASHIMOTO'S THYROIDITIS: ICD-10-CM

## 2024-03-08 DIAGNOSIS — R92.30 DENSE BREAST: ICD-10-CM

## 2024-03-08 DIAGNOSIS — Z13.1 SCREENING FOR DIABETES MELLITUS (DM): ICD-10-CM

## 2024-03-08 DIAGNOSIS — Z13.0 SCREENING FOR DEFICIENCY ANEMIA: ICD-10-CM

## 2024-03-08 DIAGNOSIS — Z12.4 CERVICAL CANCER SCREENING: ICD-10-CM

## 2024-03-08 LAB
ERYTHROCYTE [DISTWIDTH] IN BLOOD BY AUTOMATED COUNT: 11.8 % (ref 10–15)
HCT VFR BLD AUTO: 42.5 % (ref 35–47)
HGB BLD-MCNC: 14 G/DL (ref 11.7–15.7)
MCH RBC QN AUTO: 28.4 PG (ref 26.5–33)
MCHC RBC AUTO-ENTMCNC: 32.9 G/DL (ref 31.5–36.5)
MCV RBC AUTO: 86 FL (ref 78–100)
PLATELET # BLD AUTO: 250 10E3/UL (ref 150–450)
RBC # BLD AUTO: 4.93 10E6/UL (ref 3.8–5.2)
WBC # BLD AUTO: 6.3 10E3/UL (ref 4–11)

## 2024-03-08 PROCEDURE — 83735 ASSAY OF MAGNESIUM: CPT | Performed by: FAMILY MEDICINE

## 2024-03-08 PROCEDURE — 84443 ASSAY THYROID STIM HORMONE: CPT | Performed by: FAMILY MEDICINE

## 2024-03-08 PROCEDURE — 36415 COLL VENOUS BLD VENIPUNCTURE: CPT | Performed by: FAMILY MEDICINE

## 2024-03-08 PROCEDURE — 85027 COMPLETE CBC AUTOMATED: CPT | Performed by: FAMILY MEDICINE

## 2024-03-08 PROCEDURE — 87624 HPV HI-RISK TYP POOLED RSLT: CPT | Performed by: FAMILY MEDICINE

## 2024-03-08 PROCEDURE — 99395 PREV VISIT EST AGE 18-39: CPT | Mod: 25 | Performed by: FAMILY MEDICINE

## 2024-03-08 PROCEDURE — 99214 OFFICE O/P EST MOD 30 MIN: CPT | Mod: 25 | Performed by: FAMILY MEDICINE

## 2024-03-08 PROCEDURE — 93000 ELECTROCARDIOGRAM COMPLETE: CPT | Performed by: FAMILY MEDICINE

## 2024-03-08 PROCEDURE — 80048 BASIC METABOLIC PNL TOTAL CA: CPT | Performed by: FAMILY MEDICINE

## 2024-03-08 PROCEDURE — G0145 SCR C/V CYTO,THINLAYER,RESCR: HCPCS | Performed by: FAMILY MEDICINE

## 2024-03-08 SDOH — HEALTH STABILITY: PHYSICAL HEALTH: ON AVERAGE, HOW MANY DAYS PER WEEK DO YOU ENGAGE IN MODERATE TO STRENUOUS EXERCISE (LIKE A BRISK WALK)?: 2 DAYS

## 2024-03-08 ASSESSMENT — SOCIAL DETERMINANTS OF HEALTH (SDOH): HOW OFTEN DO YOU GET TOGETHER WITH FRIENDS OR RELATIVES?: MORE THAN THREE TIMES A WEEK

## 2024-03-08 ASSESSMENT — PAIN SCALES - GENERAL: PAINLEVEL: NO PAIN (0)

## 2024-03-08 NOTE — PROGRESS NOTES
Preventive Care Visit  [facility]  Kiara Morelos MD, Family Medicine  Mar 8, 2024    Assessment & Plan     Routine general medical examination at a health care facility  Discussed on regular exercises, healthy eating,  and routine dental checks.    Palpitations  Ddx- ?  Hypothyroidism/anxiety/anemia/metabolic/nonspecific  Emphasized on good hydration  Avoid caffeinated beverages  Check the thyroid labs today  Recommended baseline EKG that showed normal heart rate of 66 with no cardiac arrhythmia and in sinus rhythm  If all test results are normal, will proceed with echo to evaluate for structural heart disease and Zio patch cardiac monitoring for further evaluation  Will f/u on results and call with recommendations.  Patient verbalised understanding and is agreeable to the plan.    - EKG 12-lead complete w/read - Clinics  - Echocardiogram Complete; Future  - Basic metabolic panel  (Ca, Cl, CO2, Creat, Gluc, K, Na, BUN); Future  - Magnesium; Future  - CBC with platelets; Future  - TSH with free T4 reflex; Future  - Basic metabolic panel  (Ca, Cl, CO2, Creat, Gluc, K, Na, BUN)  - Magnesium  - CBC with platelets  - TSH with free T4 reflex    Screening for diabetes mellitus (DM)    - Basic metabolic panel  (Ca, Cl, CO2, Creat, Gluc, K, Na, BUN); Future  - Basic metabolic panel  (Ca, Cl, CO2, Creat, Gluc, K, Na, BUN)    Acquired hypothyroidism  Is on levothyroxine 50 mcg daily  - TSH with free T4 reflex; Future  - TSH with free T4 reflex  TSH   Date Value Ref Range Status   03/08/2024 2.87 0.30 - 4.20 uIU/mL Final   05/26/2023 4.07 0.30 - 4.20 uIU/mL Final   03/07/2023 3.43 0.40 - 4.00 mU/L Final   02/16/2022 1.98 0.40 - 4.00 mU/L Final   03/02/2021 3.56 0.40 - 4.00 mU/L Final   02/11/2020 1.54 0.40 - 4.00 mU/L Final   09/09/2019 2.23 0.40 - 4.00 mU/L Final   08/02/2019 5.43 (H) 0.40 - 4.00 mU/L Final   10/09/2018 2.15 0.40 - 4.00 mU/L Final       Discharge from left nipple  This was  evaluated in May 2023 with breast ultrasound and left diagnostic mammogram that showed no concerns   Patient continues to have symptoms of dense breast tissue with ongoing breast pain  Recommended to proceed with surgery consult with breast surgeon for further evaluation  Patient verbalised understanding and is agreeable to the plan.    - Adult General Surg Referral; Future    Mastodynia  as above    - Adult General Surg Referral; Future    Dense breast  as above    - Adult General Surg Referral; Future    Cervical cancer screening    - Pap Screen with HPV - recommended age 30 - 65 years    Screening for deficiency anemia    - CBC with platelets; Future  - CBC with platelets      Review of the result(s) of each unique test - EKG, CBC, TSH, magnesium, BMP, left breast ultrasound, mammogram        Counseling  Appropriate preventive services were discussed with this patient, including applicable screening as appropriate for fall prevention, nutrition, physical activity, Tobacco-use cessation, weight loss and cognition.  Checklist reviewing preventive services available has been given to the patient.  Reviewed patient's diet, addressing concerns and/or questions.   She is at risk for lack of exercise and has been provided with information to increase physical activity for the benefit of her well-being.       Chart documentation done in part with Dragon Voice recognition Software. Although reviewed after completion, some word and grammatical error may remain.    See Patient Instructions    Subjective   Lalitha is a 38 year old, presenting for the following:  Physical, Breast Problem (Sore left breast ), and Heart Problem (Heart palpation when laying on left side. )  Patient is here for annual physical and with other concerns as mentioned below palpitations-complaining of  Intermittent episodes of severe palpitations associated with lightheadedness and a near syncopal feeling several times in the past 2 months with no  associated concerns for shortness of breath, wheezing, chest pain, chest pressure, dyspnea on exertion denies previous history of cardiac arrhythmia denies underlying history of anemia, abnormal bleeding  Past medical history significant for hypothyroidism  Denies current concerns for acid reflux but does get heartburn whenever she drinks carbonated beverages      3/8/2024     2:30 PM   Additional Questions   Roomed by Dee Dee   Accompanied by self        Health Care Directive  Patient does not have a Health Care Directive or Living Will: Patient states has Advance Directive and will bring in a copy to clinic.    HPI      Hypothyroidism Follow-up    Since last visit, patient describes the following symptoms: Weight stable, no hair loss, no skin changes, no constipation, no loose stools         No data to display                  3/7/2023   Nutrition   Three or more servings of calcium each day? Yes   Diet: regular (no restrictions)         3/7/2023   Exercise   Frequency of exercise: 1 day/week            3/7/2023   Dental   Dentist two times every year? Yes              Today's PHQ-2 Score:       3/8/2024     2:26 PM   PHQ-2 ( 1999 [employer])   Q1: Little interest or pleasure in doing things 0   Q2: Feeling down, depressed or hopeless 0   PHQ-2 Score 0   Q1: Little interest or pleasure in doing things Not at all   Q2: Feeling down, depressed or hopeless Not at all   PHQ-2 Score 0         3/7/2023   Substance Use   Alcohol more than 3/day or more than 7/wk No     Social History     Tobacco Use    Smoking status: Former     Types: Cigarettes     Quit date: 8/1/2008     Years since quitting: 15.6    Smokeless tobacco: Never    Tobacco comments:     Lives in smoke free household   Vaping Use    Vaping Use: Never used   Substance Use Topics    Alcohol use: Yes     Comment: One drink per month    Drug use: No           6/9/2023   LAST FHS-7 RESULTS   1st degree relative breast or ovarian cancer No   Any relative bilateral  breast cancer No   Any male have breast cancer No   Any ONE woman have BOTH breast AND ovarian cancer No   Any woman with breast cancer before 50yrs No   2 or more relatives with breast AND/OR ovarian cancer No   2 or more relatives with breast AND/OR bowel cancer No        Mammogram Screening - Patient under 40 years of age: Routine Mammogram Screening not recommended.       History of abnormal Pap smear: NO - age 30-65 PAP every 5 years with negative HPV co-testing recommended        Latest Ref Rng & Units 2019     1:35 PM 2019     1:30 PM 2016    12:00 AM   PAP / HPV   PAP (Historical)  NIL   NIL    HPV 16 DNA NEG^Negative  Negative     HPV 18 DNA NEG^Negative  Negative     Other HR HPV NEG^Negative  Negative              No data to display                 Reviewed and updated as needed this visit by Provider                    Past Medical History:   Diagnosis Date    GERD (gastroesophageal reflux disease)     Hypothyroid     Intradermal nevus 2007    right arm-irritated intradermal nevus     Past Surgical History:   Procedure Laterality Date    MOLE REMOVAL      Several    NO HISTORY OF SURGERY      SALPINGECTOMY Left 2017    Ectopic pregnancy     OB History    Para Term  AB Living   2 1 1 0 0 1   SAB IAB Ectopic Multiple Live Births   0 0 0 0 1      # Outcome Date GA Lbr Chaz/2nd Weight Sex Delivery Anes PTL Lv   2 Term 18 40w5d  3.742 kg (8 lb 4 oz)  CS-LTranv EPI N LORIN      Complications: Failure to Progress in First Stage      Apgar1: 8  Apgar5: 8   1               Lab work is in process  Labs reviewed in EPIC  BP Readings from Last 3 Encounters:   24 99/63   23 100/62   23 96/63    Wt Readings from Last 3 Encounters:   24 54.1 kg (119 lb 4.8 oz)   23 56.8 kg (125 lb 3.2 oz)   23 56.5 kg (124 lb 8 oz)                  Patient Active Problem List   Diagnosis    CARDIOVASCULAR SCREENING; LDL GOAL LESS THAN 160    Seasonal  allergic rhinitis    Acne    Conjunctivitis, allergic    Dry eyes    Blepharitis    Hypothyroidism    Menometrorrhagia    S/P ectopic pregnancy    Gastroesophageal reflux disease without esophagitis    S/P  section    Encounter for initial prescription of contraceptive pills     delivery delivered    Family history of autoimmune disorder, MS and Lupus in mother    External hemorrhoids    Lipoma of skin and subcutaneous tissue    Anxiety    Breast pain, left    Family history of polyps in the colon    Family history of cancer    Left-sided chest wall pain    Multiple pigmented nevi    Sense of smell altered     Past Surgical History:   Procedure Laterality Date    MOLE REMOVAL      Several    NO HISTORY OF SURGERY      SALPINGECTOMY Left 2017    Ectopic pregnancy       Social History     Tobacco Use    Smoking status: Former     Types: Cigarettes     Quit date: 2008     Years since quitting: 15.6    Smokeless tobacco: Never    Tobacco comments:     Lives in smoke free household   Substance Use Topics    Alcohol use: Yes     Comment: One drink per month     Family History   Problem Relation Age of Onset    Neurologic Disorder Mother         MSAND LUPUS    Thyroid Disease Mother     Hashimoto's thyroiditis Mother     Lupus Mother     Multiple Sclerosis Mother     Colon Polyps Mother     Depression Father     Thyroid Disease Father     Depression Brother     Thyroid Disease Maternal Grandmother     Cancer Maternal Grandmother     Thyroid Disease Maternal Grandfather     Thyroid Disease Paternal Grandmother     Thyroid Disease Paternal Grandfather     Diabetes No family hx of     Hypertension No family hx of     Cerebrovascular Disease No family hx of     Glaucoma No family hx of     Macular Degeneration No family hx of     Breast Cancer No family hx of          Current Outpatient Medications   Medication Sig Dispense Refill    Cetirizine HCl (ZYRTEC PO) Take 10 mg by mouth as needed        "levothyroxine (SYNTHROID/LEVOTHROID) 50 MCG tablet Take 1 tablet (50 mcg) by mouth daily 90 tablet 3    Multiple Vitamin (MULTIVITAMIN ADULT PO)        Allergies   Allergen Reactions    Nkda [No Known Drug Allergy]     Seasonal Allergies      Recent Labs   Lab Test 05/26/23  0746 03/07/23  0911 02/16/22  0906 02/11/20  1218 09/09/19  1427   LDL  --   --  79  --   --    HDL  --   --  58  --   --    TRIG  --   --  64  --   --    ALT  --   --   --   --  18   CR  --   --   --   --  0.78   GFRESTIMATED  --   --   --   --  >90   GFRESTBLACK  --   --   --   --  >90   POTASSIUM  --   --   --   --  3.8   TSH 4.07 3.43 1.98   < > 2.23    < > = values in this interval not displayed.          Review of Systems  CONSTITUTIONAL: NEGATIVE for fever, chills, change in weight  INTEGUMENTARY/SKIN: NEGATIVE for worrisome rashes, moles or lesions  EYES: NEGATIVE for vision changes or irritation  ENT/MOUTH: NEGATIVE for ear, mouth and throat problems  RESP: NEGATIVE for significant cough or SOB  BREAST: Ongoing left breast pain, dense breast tissue  CV: Palpitations as mentioned above  GI: NEGATIVE for nausea, abdominal pain, heartburn, or change in bowel habits  : NEGATIVE for frequency, dysuria, or hematuria  MUSCULOSKELETAL: NEGATIVE for significant arthralgias or myalgia  NEURO: NEGATIVE for weakness, dizziness or paresthesias  ENDOCRINE: History of hypothyroidism  HEME: NEGATIVE for bleeding problems  PSYCHIATRIC: NEGATIVE for changes in mood or affect     Objective    Exam  BP 99/63 (BP Location: Right arm, Patient Position: Sitting, Cuff Size: Adult Regular)   Pulse 78   Temp 98.6  F (37  C) (Temporal)   Resp 12   Ht 1.626 m (5' 4\")   Wt 54.1 kg (119 lb 4.8 oz)   LMP 02/17/2024 (Exact Date)   SpO2 100%   BMI 20.48 kg/m     Estimated body mass index is 20.48 kg/m  as calculated from the following:    Height as of this encounter: 1.626 m (5' 4\").    Weight as of this encounter: 54.1 kg (119 lb 4.8 oz).    Physical " Exam  GENERAL: alert and no distress  EYES: Eyes grossly normal to inspection, PERRL and conjunctivae and sclerae normal  HENT: ear canals and TM's normal, nose and mouth without ulcers or lesions  NECK: no adenopathy, no asymmetry, masses, or scars  RESP: lungs clear to auscultation - no rales, rhonchi or wheezes  Right BREAST: normal without masses, tenderness or nipple discharge and no palpable axillary masses or adenopathy  Left BREAST: Minimal tenderness over the left breast, dense breast tissues on both side, left side slightly worse than the right, no axillary adenopathy, no nipple discharge  CV: regular rate and rhythm, normal S1 S2, no S3 or S4, no murmur, click or rub, no peripheral edema  ABDOMEN: soft, nontender, no hepatosplenomegaly, no masses and bowel sounds normal   (female) w/bimanual: normal female external genitalia, normal urethral meatus, normal vaginal mucosa, and normal cervix/adnexa/uterus without masses or discharge  MS: no gross musculoskeletal defects noted, no edema  SKIN: no suspicious lesions or rashes  NEURO: Normal strength and tone, mentation intact and speech normal  PSYCH: mentation appears normal, affect normal/bright      Signed Electronically by: Kiara Morelos MD

## 2024-03-08 NOTE — PATIENT INSTRUCTIONS
Preventive Care Advice   This is general advice given by our system to help you stay healthy. However, your care team may have specific advice just for you. Please talk to your care team about your preventive care needs.  Nutrition  Eat 5 or more servings of fruits and vegetables each day.  Try wheat bread, brown rice and whole grain pasta (instead of white bread, rice, and pasta).  Get enough calcium and vitamin D. Check the label on foods and aim for 100% of the RDA (recommended daily allowance).  Lifestyle  Exercise at least 150 minutes each week   (30 minutes a day, 5 days a week).  Do muscle strengthening activities 2 days a week. These help control your weight and prevent disease.  No smoking.  Wear sunscreen to prevent skin cancer.  Have a dental exam and cleaning every 6 months.  Yearly exams  See your health care team every year to talk about:  Any changes in your health.  Any medicines your care team has prescribed.  Preventive care, family planning, and ways to prevent chronic diseases.  Shots (vaccines)   HPV shots (up to age 26), if you've never had them before.  Hepatitis B shots (up to age 59), if you've never had them before.  COVID-19 shot: Get this shot when it's due.  Flu shot: Get a flu shot every year.  Tetanus shot: Get a tetanus shot every 10 years.  Pneumococcal, hepatitis A, and RSV shots: Ask your care team if you need these based on your risk.  Shingles shot (for age 50 and up).  General health tests  Diabetes screening:  Starting at age 35, Get screened for diabetes at least every 3 years.  If you are younger than age 35, ask your care team if you should be screened for diabetes.  Cholesterol test: At age 39, start having a cholesterol test every 5 years, or more often if advised.  Bone density scan (DEXA): At age 50, ask your care team if you should have this scan for osteoporosis (brittle bones).  Hepatitis C: Get tested at least once in your life.  STIs (sexually transmitted  infections)  Before age 24: Ask your care team if you should be screened for STIs.  After age 24: Get screened for STIs if you're at risk. You are at risk for STIs (including HIV) if:  You are sexually active with more than one person.  You don't use condoms every time.  You or a partner was diagnosed with a sexually transmitted infection.  If you are at risk for HIV, ask about PrEP medicine to prevent HIV.  Get tested for HIV at least once in your life, whether you are at risk for HIV or not.  Cancer screening tests  Cervical cancer screening: If you have a cervix, begin getting regular cervical cancer screening tests at age 21. Most people who have regular screenings with normal results can stop after age 65. Talk about this with your provider.  Breast cancer scan (mammogram): If you've ever had breasts, begin having regular mammograms starting at age 40. This is a scan to check for breast cancer.  Colon cancer screening: It is important to start screening for colon cancer at age 45.  Have a colonoscopy test every 10 years (or more often if you're at risk) Or, ask your provider about stool tests like a FIT test every year or Cologuard test every 3 years.  To learn more about your testing options, visit: https://www.[email or web address]/945315.pdf.  For help making a decision, visit: https://bit.ly/bm85191.  Prostate cancer screening test: If you have a prostate and are age 55 to 69, ask your provider if you would benefit from a yearly prostate cancer screening test.  Lung cancer screening: If you are a current or former smoker age 50 to 80, ask your care team if ongoing lung cancer screenings are right for you.  For informational purposes only. Not to replace the advice of your health care provider. Copyright   2023 [employer]. All rights reserved. Clinically reviewed by the [facility] Transitions Program. [employer] [id number] - REV 01/24.

## 2024-03-09 LAB
ANION GAP SERPL CALCULATED.3IONS-SCNC: 9 MMOL/L (ref 7–15)
BUN SERPL-MCNC: 11 MG/DL (ref 6–20)
CALCIUM SERPL-MCNC: 9.5 MG/DL (ref 8.6–10)
CHLORIDE SERPL-SCNC: 103 MMOL/L (ref 98–107)
CREAT SERPL-MCNC: 0.65 MG/DL (ref 0.51–0.95)
DEPRECATED HCO3 PLAS-SCNC: 27 MMOL/L (ref 22–29)
EGFRCR SERPLBLD CKD-EPI 2021: >90 ML/MIN/1.73M2
GLUCOSE SERPL-MCNC: 81 MG/DL (ref 70–99)
MAGNESIUM SERPL-MCNC: 2 MG/DL (ref 1.7–2.3)
POTASSIUM SERPL-SCNC: 3.8 MMOL/L (ref 3.4–5.3)
SODIUM SERPL-SCNC: 139 MMOL/L (ref 135–145)
TSH SERPL DL<=0.005 MIU/L-ACNC: 2.87 UIU/ML (ref 0.3–4.2)

## 2024-03-11 ENCOUNTER — ORDERS ONLY (AUTO-RELEASED) (OUTPATIENT)
Dept: FAMILY MEDICINE | Facility: CLINIC | Age: 39
End: 2024-03-11
Payer: COMMERCIAL

## 2024-03-11 DIAGNOSIS — R00.2 PALPITATIONS: ICD-10-CM

## 2024-03-11 RX ORDER — LEVOTHYROXINE SODIUM 50 UG/1
50 TABLET ORAL DAILY
Qty: 90 TABLET | Refills: 3 | Status: SHIPPED | OUTPATIENT
Start: 2024-03-11

## 2024-03-11 NOTE — RESULT ENCOUNTER NOTE
Glen Doty,   Your recent labs showed normal results for hemoglobin with no concern for anemia, normal thyroid functions, electrolytes magnesium.  These are good and reassuring  As discussed, please call to schedule for heart echo and cardiac monitor for further evaluation of your palpitations.   Let me know if you have any questions. Take care.  Kiara Morelos MD

## 2024-03-12 LAB
BKR LAB AP GYN ADEQUACY: NORMAL
BKR LAB AP GYN INTERPRETATION: NORMAL
BKR LAB AP HPV REFLEX: NORMAL
BKR LAB AP PREVIOUS ABNORMAL: NORMAL
PATH REPORT.COMMENTS IMP SPEC: NORMAL
PATH REPORT.COMMENTS IMP SPEC: NORMAL
PATH REPORT.RELEVANT HX SPEC: NORMAL

## 2024-03-13 ENCOUNTER — PATIENT OUTREACH (OUTPATIENT)
Dept: ONCOLOGY | Facility: CLINIC | Age: 39
End: 2024-03-13
Payer: COMMERCIAL

## 2024-03-13 ENCOUNTER — TRANSCRIBE ORDERS (OUTPATIENT)
Dept: OTHER | Age: 39
End: 2024-03-13

## 2024-03-13 DIAGNOSIS — N64.52 DISCHARGE FROM LEFT NIPPLE: Primary | ICD-10-CM

## 2024-03-13 DIAGNOSIS — N64.4 MASTODYNIA: ICD-10-CM

## 2024-03-13 DIAGNOSIS — R92.30 DENSE BREAST: ICD-10-CM

## 2024-03-13 NOTE — PROGRESS NOTES
New Patient Oncology Nurse Navigator Note     Referring provider: Kiara Morelos MD     Referring Clinic/Organization: [facility]     Referred to (specialty:) Breast Provider Referral     Date Referral Received: March 13, 2024  Of note, provider placed a general surgery order on 3/8/24 and was not transcribed correctly until today.    Evaluation for:    N64.52 (ICD-10-CM) - Discharge from left nipple  N64.4 (ICD-10-CM) - Mastodynia  R92.2, R92.30 (ICD-10-CM) - Dense breast    Clinical History (per Nurse review of records provided):      Patient saw ordering provider on 3/8/24 and sore left breast and discharge from left nipple noted. This was also present on 6/9/23 when left breast diagnostic mammogram and ultrasound were performed to evaluate left nipple discharge, mastodynia left. At that time she'd had one episode of dry, but creamy-colored white nipple discharge on the left. Pain was worse involving the medial breast and upper breast into the left shoulder.    The most recent breast imaging was on 6/9/23   Mammogram: No significant mammographic change.  Ultrasound: Targeted, real-time ultrasound evaluation was performed by the technologist and radiologist.  In the region of concern involving the left retroareolar breast as well as the left medial and upper breast there are only normal sonographic findings.     Patient continues to have symptoms of dense breast tissue with ongoing breast pain.     Please see family history from [facility] genetic counseling note on 6/12/23. Patient did not proceed with testing.       3/13 16:35 - Telephoned and spoke with Lalitha. She describes left breast pain and a series of palpable ridges vertical and lateral to the left breast. She has also noted changed in her right breast.     3/14 9:39 - Telephone and spoke with Lalitha offering consult and imaging on 3/21. She will be out of the state that date and asked for consult first week of April. Writer  received referral, reviewed for appropriate plan, and call warm transferred to New Patient Scheduling for completion.    Writer received referral, reviewed for appropriate plan, and referral transferred to New Patient Scheduling for completion.    Records Location: Care Everywhere, Media, and See Bookmarked material     Records Needed: Breast imaging for past 5 years

## 2024-03-14 LAB
HUMAN PAPILLOMA VIRUS 16 DNA: NEGATIVE
HUMAN PAPILLOMA VIRUS 18 DNA: NEGATIVE
HUMAN PAPILLOMA VIRUS FINAL DIAGNOSIS: NORMAL
HUMAN PAPILLOMA VIRUS OTHER HR: NEGATIVE

## 2024-03-17 NOTE — TELEPHONE ENCOUNTER
RECORDS STATUS - BREAST    RECORDS REQUESTED FROM: Epic   DATE REQUESTED:    NOTES DETAILS STATUS   OFFICE NOTE from referring provider Epic 03/08/24: Dr. Kiara Morelos   MEDICATION LIST Epic    IMAGING (NEED IMAGES & REPORT)     MAMMO PACS 06/09/23-08/05/19   ULTRASOUND PACS 06/09/23-08/05/19: US Breast   BRAIN MRI PACS 03/13/23

## 2024-04-02 NOTE — PROGRESS NOTES
NEW CONSULTATION   Apr 4, 2024     Lalitha Dey is a 38 year old woman who presents with left pain. She was referred by Dr. Morelos.    HPI:    She presents today with continued left breast pain, history of left nipple discharge. She has had left breast pain for years. In June, 2023 she had an episode of spontaneous solid yellow/white debris from the left nipple. She had a left diagnostic mammogram and left breast ultrasound in June that was normal. She has not seen the nipple debris or discharge since. She has continue to have the same left breast pain in the upper inner breast through the superior portion of the breast. She denies any breast mass, skin change, nipple inversion.     BREAST-SPECIFIC HISTORY:    Previous breast imaging: Yes  - 8/5/19 b/l Dmammo and left breast ultrasound for pain and lump while breast feeding BI-RADS 1  - 9/1/22 b/l Dmammo and left breast ultrasound for pain and lump BI-RADS 1  - 6/9/23 left Dmammo and left breast ultrasound for nipple discharge and breast pain: BI-RADS 1  - 4/4/24 b/l Dmammo and left breast ultrasound for pain BI-RADS 2    Prior breast biopsies/surgeries: No    Prior history of breast cancer or DCIS: No  Prior radiation history: No   Self breast exams: Yes  Breast density: extremely dense    GYN HISTORY:  Age at 1st pregnancy: 33. Breastfeeding history: Yes.   Age at menarche: 14  Menopausal: premenopausal .   Menopausal hormone replacement therapy: No     RISK ASSESSMENT: < 20% lifetime risk     FAMILY HISTORY:  Breast ca: No  Ovarian ca: No  Pancreatic ca: No  Prostate: No  Gastric ca: No  Melanoma: No  Colon ca: No  Other cancer: Yes  - paternal aunt with lung cancer  - maternal grandmother with bladder cancer  Other genetic, testing, syndromes, or clotting disorders: no     PAST MEDICAL HISTORY  Past Medical History:   Diagnosis Date    GERD (gastroesophageal reflux disease)     Hypothyroid     Intradermal nevus 03/27/2007    right arm-irritated intradermal  "nevus     PAST SURGICAL HISTORY   Past Surgical History:   Procedure Laterality Date    MOLE REMOVAL      Several    NO HISTORY OF SURGERY      SALPINGECTOMY Left 11/28/2017    Ectopic pregnancy     MEDICATIONS  Current Outpatient Medications   Medication Sig Dispense Refill    Cetirizine HCl (ZYRTEC PO) Take 10 mg by mouth as needed       levothyroxine (SYNTHROID/LEVOTHROID) 50 MCG tablet Take 1 tablet (50 mcg) by mouth daily 90 tablet 3    Multiple Vitamin (MULTIVITAMIN ADULT PO)        ALLERGIES  Allergies   Allergen Reactions    Nkda [No Known Drug Allergy]     Seasonal Allergies       SOCIAL HISTORY:  Smokes: No, former  Seen today with male.     ROS:   Change in vision No  Headaches: no  Respiratory: No shortness of breath, dyspnea on exertion, cough, or hemoptysis   Cardiovascular: negative   Gastrointestinal: negative Abdominal pain: no  Breast: negative  Musculoskeletal: negative Joint pain No Back pain: no  Psychiatric: negative  Hematologic/Lymphatic/Immunologic: negative  Endocrine: negative    EXAM  /69   Pulse 78   Temp 97.9  F (36.6  C)   Resp 16   Ht 1.626 m (5' 4\")   Wt 55.3 kg (122 lb)   LMP 02/17/2024 (Exact Date)   SpO2 98%   BMI 20.94 kg/m     PHYSICAL EXAM  Respiratory: breathing non labored.   Breasts: Examination was done in both the upright and supine positions.  Breasts are symmetrical . No dominant fixed or suspicious masses noted. No skin or nipple changes. No nipple discharge.   No clavicular, cervical, or axillary lymphadenopathy.     INVESTIGATIONS:    4/4/24 bilateral diagnostic mammogram and left breast ultrasound: BI-RADS 2    ASSESSMENT/PLAN:    Lalitha Dey is a 38 year old woman with left breast pain. No concerning findings on exam, mammogram, or ultrasound.     1) Left breast pain - likely physiologic   Reviewed images today with the Radiologist and results were discussed with the patient. No concerning findings.   Breast pain is common. Up to 70% of women will " experience breast pain in their lifetime.   Breast pain is most often related to normal physiologic change (hormonal fluctuations) and is a rare symptom of breast cancer.   Management of breast pain  Evaluation with imaging is important to determine if the pain is due to normal physiologic changes related to hormonal fluctuations or to a pathologic process.   Physical support  Wear a supportive Bra that is professionally fit and sized. Be sure your bra is not overly compressive or restrictive.   Wear a sports bra when exercising.   Wear comfortable breast support while sleeping.   Improving omega 3 fatty acid intake may improve breast pain. Eat 1-2 tablespoons of flaxseed daily.   Plant-based diets may help with breast pain.  Breast massage. (see handout)   Warm compresses or ice packs can be used  Acupuncture may help with breast pain.   [facility]   Consider eliminating or decrease caffeine (chocolate, tea, coffee, soda) for a two week period of time to see if pain improves/resolves  Evening Primrose Oil starting with 500 mg/day. May increase to 1000 mg 3 times daily for 6 months. Can be tried.   Chasteberry 400-500 mg/day can be tried.   If needed over the counter analgesics such as acetaminophen, ibuprofen, or topical diclofenac cream can intermittently be used.     2) Screening  Clinic encounter every 1-3 years. Be familiar with your breast and how they normally feel and appear. Promptly report any changes to your healthcare provider. Beginning screening mammograms at age 40.     Neeru Pérez PA-C    30 minutes spent on the date of the encounter doing chart review, review of test results, interpretation of tests, patient visit and documentation.

## 2024-04-04 ENCOUNTER — ANCILLARY PROCEDURE (OUTPATIENT)
Dept: MAMMOGRAPHY | Facility: CLINIC | Age: 39
End: 2024-04-04
Attending: PHYSICIAN ASSISTANT
Payer: COMMERCIAL

## 2024-04-04 ENCOUNTER — PRE VISIT (OUTPATIENT)
Dept: SURGERY | Facility: CLINIC | Age: 39
End: 2024-04-04

## 2024-04-04 ENCOUNTER — ONCOLOGY VISIT (OUTPATIENT)
Dept: SURGERY | Facility: CLINIC | Age: 39
End: 2024-04-04
Attending: FAMILY MEDICINE
Payer: COMMERCIAL

## 2024-04-04 VITALS
HEART RATE: 78 BPM | SYSTOLIC BLOOD PRESSURE: 102 MMHG | TEMPERATURE: 97.9 F | HEIGHT: 64 IN | RESPIRATION RATE: 16 BRPM | OXYGEN SATURATION: 98 % | WEIGHT: 122 LBS | BODY MASS INDEX: 20.83 KG/M2 | DIASTOLIC BLOOD PRESSURE: 69 MMHG

## 2024-04-04 DIAGNOSIS — R92.30 DENSE BREAST: ICD-10-CM

## 2024-04-04 DIAGNOSIS — N64.4 BREAST PAIN, LEFT: ICD-10-CM

## 2024-04-04 DIAGNOSIS — N64.52 DISCHARGE FROM LEFT NIPPLE: ICD-10-CM

## 2024-04-04 DIAGNOSIS — N64.4 MASTODYNIA: ICD-10-CM

## 2024-04-04 DIAGNOSIS — E06.3 HYPOTHYROIDISM DUE TO HASHIMOTO'S THYROIDITIS: ICD-10-CM

## 2024-04-04 PROCEDURE — G0279 TOMOSYNTHESIS, MAMMO: HCPCS | Performed by: RADIOLOGY

## 2024-04-04 PROCEDURE — 76642 ULTRASOUND BREAST LIMITED: CPT | Mod: LT | Performed by: RADIOLOGY

## 2024-04-04 PROCEDURE — 77066 DX MAMMO INCL CAD BI: CPT | Performed by: RADIOLOGY

## 2024-04-04 PROCEDURE — 99203 OFFICE O/P NEW LOW 30 MIN: CPT | Performed by: PHYSICIAN ASSISTANT

## 2024-04-04 PROCEDURE — 99213 OFFICE O/P EST LOW 20 MIN: CPT | Performed by: PHYSICIAN ASSISTANT

## 2024-04-04 RX ORDER — LEVOTHYROXINE SODIUM 50 UG/1
50 TABLET ORAL DAILY
Qty: 90 TABLET | Refills: 3 | OUTPATIENT
Start: 2024-04-04

## 2024-04-04 ASSESSMENT — PAIN SCALES - GENERAL: PAINLEVEL: NO PAIN (0)

## 2024-04-04 NOTE — PATIENT INSTRUCTIONS
Lalitha Dey is a 38 year old woman with left breast pain. No concerning findings on exam, mammogram, or ultrasound.     1) Left breast pain - likely physiologic   Reviewed images today with the Radiologist and results were discussed with the patient. No concerning findings.   Breast pain is common. Up to 70% of women will experience breast pain in their lifetime.   Breast pain is most often related to normal physiologic change (hormonal fluctuations) and is a rare symptom of breast cancer.   Management of breast pain  Evaluation with imaging is important to determine if the pain is due to normal physiologic changes related to hormonal fluctuations or to a pathologic process.   Physical support  Wear a supportive Bra that is professionally fit and sized. Be sure your bra is not overly compressive or restrictive.   Wear a sports bra when exercising.   Wear comfortable breast support while sleeping.   Improving omega 3 fatty acid intake may improve breast pain. Eat 1-2 tablespoons of flaxseed daily.   Plant-based diets may help with breast pain.  Breast massage. (see handout)   Warm compresses or ice packs can be used  Acupuncture may help with breast pain.   [facility]   Consider eliminating or decrease caffeine (chocolate, tea, coffee, soda) for a two week period of time to see if pain improves/resolves  Evening Primrose Oil starting with 500 mg/day. May increase to 1000 mg 3 times daily for 6 months. Can be tried.   Chasteberry 400-500 mg/day can be tried.   If needed over the counter analgesics such as acetaminophen, ibuprofen, or topical diclofenac cream can intermittently be used.     2) Screening  Clinic encounter every 1-3 years. Be familiar with your breast and how they normally feel and appear. Promptly report any changes to your healthcare provider. Beginning screening mammograms at age 40.

## 2024-04-04 NOTE — NURSING NOTE
"Oncology Rooming Note    April 4, 2024 8:32 AM   Lalitha Dey is a 38 year old female who presents for:    Chief Complaint   Patient presents with    Oncology Clinic Visit     Discharge left nipple; Mastodynia; dense breast     Initial Vitals: /69   Pulse 78   Temp 97.9  F (36.6  C)   Resp 16   Ht 1.626 m (5' 4\")   Wt 55.3 kg (122 lb)   LMP 02/17/2024 (Exact Date)   SpO2 98%   BMI 20.94 kg/m   Estimated body mass index is 20.94 kg/m  as calculated from the following:    Height as of this encounter: 1.626 m (5' 4\").    Weight as of this encounter: 55.3 kg (122 lb). Body surface area is 1.58 meters squared.  No Pain (0) Comment: Data Unavailable   Patient's last menstrual period was 02/17/2024 (exact date).  Allergies reviewed: Yes  Medications reviewed: Yes    Medications: Medication refills not needed today.  Pharmacy [facility] entered into [id number] [place] [id number]    Frailty Screening:   Is the patient here for a new oncology consult visit in cancer care? 2. No      Clinical concerns:        Ashley Grant              "

## 2024-04-04 NOTE — LETTER
4/4/2024         RE: Lalitha Dey  [address]  [place] [address]        Dear Colleague,    Thank you for referring your patient, Lalitha Dey, to the [facility]. Please see a copy of my visit note below.    NEW CONSULTATION   Apr 4, 2024     Lalitha Dey is a 38 year old woman who presents with left pain. She was referred by Dr. Morelos.    HPI:    She presents today with continued left breast pain, history of left nipple discharge. She has had left breast pain for years. In June, 2023 she had an episode of spontaneous solid yellow/white debris from the left nipple. She had a left diagnostic mammogram and left breast ultrasound in June that was normal. She has not seen the nipple debris or discharge since. She has continue to have the same left breast pain in the upper inner breast through the superior portion of the breast. She denies any breast mass, skin change, nipple inversion.     BREAST-SPECIFIC HISTORY:    Previous breast imaging: Yes  - 8/5/19 b/l Dmammo and left breast ultrasound for pain and lump while breast feeding BI-RADS 1  - 9/1/22 b/l Dmammo and left breast ultrasound for pain and lump BI-RADS 1  - 6/9/23 left Dmammo and left breast ultrasound for nipple discharge and breast pain: BI-RADS 1    Prior breast biopsies/surgeries: No    Prior history of breast cancer or DCIS: No  Prior radiation history: No   Self breast exams: Yes  Breast density: extremely dense    GYN HISTORY:  Age at 1st pregnancy: 33. Breastfeeding history: Yes.   Age at menarche: 14  Menopausal: premenopausal .   Menopausal hormone replacement therapy: No     RISK ASSESSMENT: < 20% lifetime risk     FAMILY HISTORY:  Breast ca: No  Ovarian ca: No  Pancreatic ca: No  Prostate: No  Gastric ca: No  Melanoma: No  Colon ca: No  Other cancer: Yes  - paternal aunt with lung cancer  - maternal grandmother with bladder cancer  Other genetic, testing, syndromes, or clotting disorders: no     PAST  "MEDICAL HISTORY  Past Medical History:   Diagnosis Date    GERD (gastroesophageal reflux disease)     Hypothyroid     Intradermal nevus 03/27/2007    right arm-irritated intradermal nevus     PAST SURGICAL HISTORY   Past Surgical History:   Procedure Laterality Date    MOLE REMOVAL      Several    NO HISTORY OF SURGERY      SALPINGECTOMY Left 11/28/2017    Ectopic pregnancy     MEDICATIONS  Current Outpatient Medications   Medication Sig Dispense Refill    Cetirizine HCl (ZYRTEC PO) Take 10 mg by mouth as needed       levothyroxine (SYNTHROID/LEVOTHROID) 50 MCG tablet Take 1 tablet (50 mcg) by mouth daily 90 tablet 3    Multiple Vitamin (MULTIVITAMIN ADULT PO)        ALLERGIES  Allergies   Allergen Reactions    Nkda [No Known Drug Allergy]     Seasonal Allergies       SOCIAL HISTORY:  Smokes: No, former  Seen today with male.     ROS:   Change in vision No  Headaches: no  Respiratory: No shortness of breath, dyspnea on exertion, cough, or hemoptysis   Cardiovascular: negative   Gastrointestinal: negative Abdominal pain: no  Breast: negative  Musculoskeletal: negative Joint pain No Back pain: no  Psychiatric: negative  Hematologic/Lymphatic/Immunologic: negative  Endocrine: negative    EXAM  /69   Pulse 78   Temp 97.9  F (36.6  C)   Resp 16   Ht 1.626 m (5' 4\")   Wt 55.3 kg (122 lb)   LMP 02/17/2024 (Exact Date)   SpO2 98%   BMI 20.94 kg/m     PHYSICAL EXAM  Respiratory: breathing non labored.   Breasts: Examination was done in both the upright and supine positions.  Breasts are symmetrical . No dominant fixed or suspicious masses noted. No skin or nipple changes. No nipple discharge.   No clavicular, cervical, or axillary lymphadenopathy.     INVESTIGATIONS:    4/4/24 bilateral diagnostic mammogram and left breast ultrasound: per Radiology no concerning findings, final report pending.     ASSESSMENT/PLAN:    Lalitha Dey is a 38 year old woman with left breast pain. No concerning findings on exam, " mammogram, or ultrasound.     1) Left breast pain - likely physiologic   Reviewed images today with the Radiologist and results were discussed with the patient. No concerning findings.   Breast pain is common. Up to 70% of women will experience breast pain in their lifetime.   Breast pain is most often related to normal physiologic change (hormonal fluctuations) and is a rare symptom of breast cancer.   Management of breast pain  Evaluation with imaging is important to determine if the pain is due to normal physiologic changes related to hormonal fluctuations or to a pathologic process.   Physical support  Wear a supportive Bra that is professionally fit and sized. Be sure your bra is not overly compressive or restrictive.   Wear a sports bra when exercising.   Wear comfortable breast support while sleeping.   Improving omega 3 fatty acid intake may improve breast pain. Eat 1-2 tablespoons of flaxseed daily.   Plant-based diets may help with breast pain.  Breast massage. (see handout)   Warm compresses or ice packs can be used  Acupuncture may help with breast pain.   [facility]   Consider eliminating or decrease caffeine (chocolate, tea, coffee, soda) for a two week period of time to see if pain improves/resolves  Evening Primrose Oil starting with 500 mg/day. May increase to 1000 mg 3 times daily for 6 months. Can be tried.   Chasteberry 400-500 mg/day can be tried.   If needed over the counter analgesics such as acetaminophen, ibuprofen, or topical diclofenac cream can intermittently be used.     2) Screening  Clinic encounter every 1-3 years. Be familiar with your breast and how they normally feel and appear. Promptly report any changes to your healthcare provider. Beginning screening mammograms at age 40.     Neeru Pérez PA-C    30 minutes spent on the date of the encounter doing chart review, review of test results, interpretation of tests, patient visit and documentation.

## 2024-04-15 ENCOUNTER — TELEPHONE (OUTPATIENT)
Dept: FAMILY MEDICINE | Facility: CLINIC | Age: 39
End: 2024-04-15
Payer: COMMERCIAL

## 2024-04-15 NOTE — TELEPHONE ENCOUNTER
Called patient for an update if she forwarded the Zio patch [id number] back to Zio patch/ [employer] left detail message.       Note: [facility]   Attn: Intake   [address] [address]  [facility] [address]       Post-Care Instructions: I reviewed with the patient in detail post-care instructions. Patient is not to engage in any heavy lifting, exercise, or swimming for the next 14 days. Should the patient develop any fevers, chills, bleeding, severe pain patient will contact the office immediately.

## 2024-04-15 NOTE — TELEPHONE ENCOUNTER
Pt called back regarding the Zio Patch Pt is wearing that until next Sunday. Pt just wanted to inform regarding note. No further needs at this time.         Aria SANTACRUZ Visit Facilitator

## 2024-05-05 PROCEDURE — 93248 EXT ECG>7D<15D REV&INTERPJ: CPT | Performed by: INTERNAL MEDICINE

## 2024-05-06 NOTE — RESULT ENCOUNTER NOTE
G[place] Doty,  Your cardiac monitor showed no concerns for significant cardiac apneas, this is good.  I will wait for the heart echo results for further recommendations.   Let me know if you have any questions. Take care.  Kiara Morelos MD

## 2024-05-24 ENCOUNTER — ANCILLARY PROCEDURE (OUTPATIENT)
Dept: CARDIOLOGY | Facility: CLINIC | Age: 39
End: 2024-05-24
Attending: FAMILY MEDICINE
Payer: COMMERCIAL

## 2024-05-24 DIAGNOSIS — R00.2 PALPITATIONS: ICD-10-CM

## 2024-05-24 LAB — LVEF ECHO: NORMAL

## 2024-05-24 PROCEDURE — 93306 TTE W/DOPPLER COMPLETE: CPT | Performed by: INTERNAL MEDICINE

## 2024-05-27 NOTE — RESULT ENCOUNTER NOTE
Dear Lalitha,  Your recent heart echo showed no structural heart disease or valve abnormalities causing palpitations, this is reassuring.  We will discuss further at your upcoming visit next week.   Let me know if you have any questions. Take care.  Kiara Morelos MD

## 2024-08-30 NOTE — MR AVS SNAPSHOT
"              After Visit Summary   11/15/2017    Lalitha Cortez    MRN: [id number]           Patient Information     Date Of Birth          1985        Visit Information        Provider Department      11/15/2017 11:00 AM Williams Beck PA-C [facility]        Today's Diagnoses     Pregnancy test positive    -  1    Missed menses          Care Instructions    Expected due date 7/21/18            Follow-ups after your visit        Your next 10 appointments already scheduled     Nov 15, 2017 11:00 AM CST   SHORT with Williams Beck PA-C   [facility] ([facility]    [address] [facility] [address] [phone]              Who to contact     If you have questions or need follow up information about today's clinic visit or your schedule please contact [facility] directly at [phone].  Normal or non-critical lab and imaging results will be communicated to you by MyChart, letter or phone within 4 business days after the clinic has received the results. If you do not hear from us within 7 days, please contact the clinic through MyChart or phone. If you have a critical or abnormal lab result, we will notify you by phone as soon as possible.  Submit refill requests through [employer] or call your pharmacy and they will forward the refill request to us. Please allow 3 business days for your refill to be completed.          Additional Information About Your Visit        MyChart Information     [employer] lets you send messages to your doctor, view your test results, renew your prescriptions, schedule appointments and more. To sign up, go to [email or web address] . Click on \"Log in\" on the left side of the screen, which will take you to the Welcome page. Then click on \"Sign up Now\" on the right side of the page.     You will be asked to enter the access code listed below, as well as some personal information. Please follow the directions to " create your username and password.     Your access code is: [email or web address]  Expires: 2018  9:09 AM     Your access code will  in 90 days. If you need help or a new code, please call your [facility] clinic or [phone].        Care EveryWhere ID     This is your Care EveryWhere ID. This could be used by other organizations to access your [facility] medical records  [id number]        Your Vitals Were     Pulse Last Period Pulse Oximetry BMI (Body Mass Index)          56 10/14/2017 100% 19.36 kg/m2         Blood Pressure from Last 3 Encounters:   11/15/17 116/68   17 108/68   16 116/64    Weight from Last 3 Encounters:   11/15/17 112 lb 12.8 oz (51.2 kg)   17 115 lb (52.2 kg)   16 113 lb (51.3 kg)              We Performed the Following     Beta HCG qual IFA urine        Primary Care Provider Office Phone # Fax #    Kristen M Kehr, PA-C [phone] [address] [address] [facility] [address]        Equal Access to Services     [facility] : Hadii aad ku hadasho Soomaali, waaxda luqadaha, qaybta kaalmada adeegyada, waxmaya akers . So [facility] [phone].    ATENCIÓN: Si habla español, tiene a heredia disposición servicios gratuitos de asistencia lingüística. [facility] [phone].    We comply with applicable federal civil rights laws and [place] laws. We do not discriminate on the basis of race, color, national origin, age, disability, sex, sexual orientation, or gender identity.            Thank you!     Thank you for choosing [facility]  for your care. Our goal is always to provide you with excellent care. Hearing back from our patients is one way we can continue to improve our services. Please take a few minutes to complete the written survey that you may receive in the mail after your visit with us. Thank you!             Your Updated Medication List - Protect others around you: Learn how to safely use, store and throw away your  Detail Level: Zone medicines at www.disposemymeds.org.          This list is accurate as of: 11/15/17  9:09 AM.  Always use your most recent med list.                   Brand Name Dispense Instructions for use Diagnosis    levothyroxine 50 MCG tablet    SYNTHROID    90 tablet    Take 1 tablet (50 mcg) by mouth daily    Hypothyroidism, unspecified type       prenatal multivitamin plus iron 27-0.8 MG Tabs per tablet      Take 1 tablet by mouth daily        ZYRTEC 10 MG tablet   Generic drug:  cetirizine      Take 10 mg by mouth daily.           Initiate Treatment: ammonium lactate 12 % lotion \\n\\nSig: Apply to dry skin throughout the body twice daily Render In Strict Bullet Format?: No Initiate Treatment: triamcinolone acetonide 0.1 % topical cream Bid\\n\\nSig: Apply to affected areas of eczema on the body twice daily x2 weeks when flared then decrease to three times weekly for maintenance.

## 2024-12-02 ENCOUNTER — LAB (OUTPATIENT)
Dept: LAB | Facility: CLINIC | Age: 39
End: 2024-12-02
Attending: INTERNAL MEDICINE
Payer: COMMERCIAL

## 2024-12-02 ENCOUNTER — VIRTUAL VISIT (OUTPATIENT)
Dept: FAMILY MEDICINE | Facility: CLINIC | Age: 39
End: 2024-12-02
Payer: COMMERCIAL

## 2024-12-02 ENCOUNTER — MYC MEDICAL ADVICE (OUTPATIENT)
Dept: FAMILY MEDICINE | Facility: CLINIC | Age: 39
End: 2024-12-02

## 2024-12-02 ENCOUNTER — TELEPHONE (OUTPATIENT)
Dept: FAMILY MEDICINE | Facility: CLINIC | Age: 39
End: 2024-12-02

## 2024-12-02 DIAGNOSIS — B34.9 VIRAL SYNDROME: ICD-10-CM

## 2024-12-02 DIAGNOSIS — J02.9 SORE THROAT: Primary | ICD-10-CM

## 2024-12-02 DIAGNOSIS — J02.0 STREPTOCOCCAL PHARYNGITIS: ICD-10-CM

## 2024-12-02 DIAGNOSIS — J02.0 STREPTOCOCCAL PHARYNGITIS: Primary | ICD-10-CM

## 2024-12-02 DIAGNOSIS — J02.9 SORE THROAT: ICD-10-CM

## 2024-12-02 LAB
DEPRECATED S PYO AG THROAT QL EIA: POSITIVE
FLUAV RNA SPEC QL NAA+PROBE: NEGATIVE
FLUBV RNA RESP QL NAA+PROBE: NEGATIVE
RSV RNA SPEC NAA+PROBE: NEGATIVE
SARS-COV-2 RNA RESP QL NAA+PROBE: NEGATIVE

## 2024-12-02 PROCEDURE — 99213 OFFICE O/P EST LOW 20 MIN: CPT | Mod: 95 | Performed by: INTERNAL MEDICINE

## 2024-12-02 PROCEDURE — 87637 SARSCOV2&INF A&B&RSV AMP PRB: CPT

## 2024-12-02 PROCEDURE — G2211 COMPLEX E/M VISIT ADD ON: HCPCS | Mod: 95 | Performed by: INTERNAL MEDICINE

## 2024-12-02 PROCEDURE — 87880 STREP A ASSAY W/OPTIC: CPT

## 2024-12-02 RX ORDER — AMOXICILLIN 500 MG/1
1000 CAPSULE ORAL DAILY
Qty: 20 CAPSULE | Refills: 0 | Status: SHIPPED | OUTPATIENT
Start: 2024-12-02

## 2024-12-02 RX ORDER — AMOXICILLIN 500 MG/1
1000 CAPSULE ORAL DAILY
Qty: 20 CAPSULE | Refills: 0 | Status: SHIPPED | OUTPATIENT
Start: 2024-12-02 | End: 2024-12-02

## 2024-12-02 NOTE — TELEPHONE ENCOUNTER
Patient calling for a couple of questions:    Patient tested positive for strep and prescription was sent to pharmacy for amoxicillin. Patient states that she put in her chart that she wanted this sent to a different [facility], explained that likely what happened was her chart showed two favored pharmacies and prescription when to the first listed. Patient is requesting prescription be sent to [facility] in [place]. Pharmacy information updated.     Patient states that her son had strep with pneumonia and had originally been put on amoxicillin but that wasn't strong enough. He was given a pink liquid that patient can't remember the name of and Augmentin. She is wondering if she should be on the stronger antibiotics since Amoxicillin didn't really work in her son's case.     Routing to provider, please review and advise. Fermin Barrett, RN, BSN

## 2024-12-02 NOTE — TELEPHONE ENCOUNTER
Pt calling back. She stated that her  got tested positive for pneumonia today and she would like to be tested also to make sure she get the necessary medication if she has it. She would like to have this done ASAP if her PCP or Dr. Garcia can put this order in for her to have the test done

## 2024-12-02 NOTE — PROGRESS NOTES
"  If patient has telephone visit, have they been educated on video visit as preferred visit method and offered to change to video visit? NOT APPLICABLE        Instructions Relayed to Patient by Virtual Roomer:     Patient is active on [facility]:   Relayed following to patient: \"It looks like you are active on [facility], are you able to join the visit this way? If not, do you need us to send you a link now or would you like your provider to send a link via text or email when they are ready to initiate the visit?\"      Patient Confirmed they will join visit via: Text Link to Cell Phone  Reminded patient to ensure they were logged on to virtual visit by arrival time listed.   Asked if patient has flexibility to initiate visit sooner than arrival time: patient stated yes, documented in appointment notes availability to initiate visit earlier than arrival time     If pediatric virtual visit, ensured pediatric patient along with parent/guardian will be present for video visit.     Patient offered the website [email or web address] and/or phone number to [facility] Help line: [phone]      Answers submitted by the patient for this visit:  General Questionnaire (Submitted on 12/2/2024)  Chief Complaint: Chronic problems general questions HPI Form  How many days per week do you miss taking your medication?: 0  General Concern (Submitted on 12/2/2024)  Chief Complaint: Chronic problems general questions HPI Form  What is the reason for your visit today?: Concerns I have flu, covid, strep, pneumonia, etc.  When did your symptoms begin?: 3-7 days ago  What are your symptoms?: Cough, chills, sweating, headache, brain fog, body aches, sore throat  How would you describe these symptoms?: Moderate  Are your symptoms:: Worsening  Have you had these symptoms before?: No  Is there anything that makes you feel worse?: Yes, doing anything  Is there anything that makes you feel better?: Sleeping  Questionnaire about: Chronic " problems general questions HPI Form (Submitted on 12/2/2024)  Chief Complaint: Chronic problems general questions HPI Form  Lalitha is a 39 year old who is being evaluated via a billable video visit.    How would you like to obtain your AVS? MyChart  If the video visit is dropped, the invitation should be resent by: Text to cell phone: [phone]  Will anyone else be joining your video visit? No      Assessment & Plan     Lalitha was seen today for flu.    Diagnoses and all orders for this visit:    Sore throat  -     Streptococcus A Rapid Screen w/Reflex to PCR - Clinic Collect; Future    Viral syndrome  -     Influenza A/B, RSV and SARS-CoV2 PCR (COVID-19); Future    Supportive care. Wait for test result and definitive treatment plan.  Follow up or urgent care if symptoms dramatically worsens.      Subjective   Lalitha is a 39 year old, presenting for the following health issues:  No chief complaint on file.        12/2/2024    11:36 AM   Additional Questions   Roomed by Tshia   Accompanied by self     History of Present Illness       Reason for visit:  Concerns I have flu, covid, strep, pneumonia, etc.  Symptom onset:  3-7 days ago  Symptoms include:  Cough, chills, sweating, headache, brain fog, body aches, sore throat  Symptom intensity:  Moderate  Symptom progression:  Worsening  Had these symptoms before:  No  What makes it worse:  Yes, doing anything  What makes it better:  Sleeping   She is taking medications regularly.     Her son was diagnose with strep and pneumonia. Dx 11/18/2024. He just finished his 10 days of antibiotic. He still has a lingering cough.   Her  and her have similar symptoms.  Fever, chills, sweating,  body aches. Some cough, not a lot.  Sore throat.   Some diarrhea. Stomach girggling. Nothing sounds good. Not eating well.  No vomiting. Bad headache. Tylenol helpful.   Able to keep fluid down.   Her symptoms started last Friday evening.   No home test.     Home temp just now  99.7F      Review of Systems  Constitutional, HEENT, cardiovascular, pulmonary, gi and gu systems are negative, except as otherwise noted.      Objective           Vitals:  No vitals were obtained today due to virtual visit.    Physical Exam   GENERAL: alert and no distress, tired appearing  EYES: Eyes grossly normal to inspection.  No discharge or erythema, or obvious scleral/conjunctival abnormalities.  RESP: no respiratory distress. Occasional cough  SKIN: Visible skin clear. No significant rash, abnormal pigmentation or lesions.  NEURO: Cranial nerves grossly intact.  Mentation and speech appropriate for age.  PSYCH: Appropriate affect, tone, and pace of words          Video-Visit Details    Type of service:  Video Visit   Originating Location (pt. Location): Home    Distant Location (provider location):  Off-site  Platform used for Video Visit: Makayla  Signed Electronically by: Martine Garcia MD PhD

## 2024-12-03 ENCOUNTER — ANCILLARY PROCEDURE (OUTPATIENT)
Dept: GENERAL RADIOLOGY | Facility: CLINIC | Age: 39
End: 2024-12-03
Attending: FAMILY MEDICINE
Payer: COMMERCIAL

## 2024-12-03 ENCOUNTER — NURSE TRIAGE (OUTPATIENT)
Dept: FAMILY MEDICINE | Facility: CLINIC | Age: 39
End: 2024-12-03

## 2024-12-03 ENCOUNTER — NURSE TRIAGE (OUTPATIENT)
Dept: NURSING | Facility: CLINIC | Age: 39
End: 2024-12-03

## 2024-12-03 ENCOUNTER — OFFICE VISIT (OUTPATIENT)
Dept: FAMILY MEDICINE | Facility: CLINIC | Age: 39
End: 2024-12-03
Payer: COMMERCIAL

## 2024-12-03 VITALS
BODY MASS INDEX: 21.98 KG/M2 | RESPIRATION RATE: 16 BRPM | DIASTOLIC BLOOD PRESSURE: 63 MMHG | TEMPERATURE: 99.6 F | OXYGEN SATURATION: 98 % | HEART RATE: 96 BPM | WEIGHT: 131.9 LBS | HEIGHT: 65 IN | SYSTOLIC BLOOD PRESSURE: 114 MMHG

## 2024-12-03 DIAGNOSIS — R05.1 ACUTE COUGH: ICD-10-CM

## 2024-12-03 DIAGNOSIS — J02.0 STREPTOCOCCAL PHARYNGITIS: ICD-10-CM

## 2024-12-03 DIAGNOSIS — R05.1 ACUTE COUGH: Primary | ICD-10-CM

## 2024-12-03 PROCEDURE — 99214 OFFICE O/P EST MOD 30 MIN: CPT | Performed by: FAMILY MEDICINE

## 2024-12-03 PROCEDURE — 71046 X-RAY EXAM CHEST 2 VIEWS: CPT | Mod: TC | Performed by: RADIOLOGY

## 2024-12-03 RX ORDER — FLUCONAZOLE 150 MG/1
150 TABLET ORAL
Qty: 3 TABLET | Refills: 0 | Status: SHIPPED | OUTPATIENT
Start: 2024-12-03 | End: 2024-12-10

## 2024-12-03 RX ORDER — DOXYCYCLINE HYCLATE 100 MG
100 TABLET ORAL 2 TIMES DAILY
Qty: 14 TABLET | Refills: 0 | Status: SHIPPED | OUTPATIENT
Start: 2024-12-03 | End: 2024-12-10

## 2024-12-03 ASSESSMENT — PAIN SCALES - GENERAL: PAINLEVEL_OUTOF10: MILD PAIN (3)

## 2024-12-03 NOTE — TELEPHONE ENCOUNTER
Reviewed virtual visit note from Dr. Garcia  Patient was tested positive for strep and already received antibiotics that can also work for any lower respiratory infections including pneumonia  I will defer further recommendations to Dr. Garcia

## 2024-12-03 NOTE — PROGRESS NOTES
Assessment & Plan     Acute cough  Patient here today with upper respiratory symptoms that seem to be worsening.  Her son just got over a course of antibiotics for strep and pneumonia and is doing fine.  But a couple of days ago the patient came down with sore throat, fever and chills, body aches.  Did a virtual visit yesterday and rapid strep was positive.  Started last evening on amoxicillin 1000 mg daily.  But she still feels terrible including cough and congestion.   had seen someone yesterday and they diagnosed him with pneumonia.  Patient not short of breath.  On auscultation there are some loose rhonchi but no other thing focal and no wheezing.  However on x-ray there is an area of consolidation in the right middle lobe that may be atelectasis but could be consistent with pneumonia.  Discussed with patient and we will go ahead and change away from amoxicillin to doxycycline.  I think this would be a better choice I can treat strep and any possible pneumonia.  - XR Chest 2 Views; Future  - doxycycline hyclate (VIBRA-TABS) 100 MG tablet; Take 1 tablet (100 mg) by mouth 2 times daily for 7 days.  - fluconazole (DIFLUCAN) 150 MG tablet; Take 1 tablet (150 mg) by mouth every 3 days for 3 doses.    Streptococcal pharyngitis  Okay to stop the amoxicillin and doxycycline should take care of this as well.            See Patient Instructions    Subjective   Lalitha is a 39 year old, presenting for the following health issues:  Pneumonia (Concerns)        12/3/2024    10:15 AM   Additional Questions   Roomed by Axel   Accompanied by Self         12/3/2024    10:15 AM   Patient Reported Additional Medications   Patient reports taking the following new medications None     History of Present Illness       Reason for visit:  Concerns I have flu, covid, strep, pneumonia, etc.  Symptom onset:  3-7 days ago  Symptoms include:  Cough, chills, sweating, headache, brain fog, body aches, sore throat  Symptom intensity:   "Moderate  Symptom progression:  Worsening  Had these symptoms before:  No  What makes it worse:  Yes, doing anything  What makes it better:  Sleeping   She is taking medications regularly.           Here today with illness symptoms as above.      Review of Systems  Constitutional, HEENT, cardiovascular, pulmonary, gi and gu systems are negative, except as otherwise noted.      Objective    /63 (BP Location: Right arm, Patient Position: Sitting, Cuff Size: Adult Regular)   Pulse 96   Temp 99.6  F (37.6  C) (Tympanic)   Resp 16   Ht 1.651 m (5' 5\")   Wt 59.8 kg (131 lb 14.4 oz)   LMP 11/28/2024 (Exact Date)   SpO2 98%   BMI 21.95 kg/m    Body mass index is 21.95 kg/m .  Physical Exam   Alert and pleasant but clearly tired and rundown  Ears normal. Throat and pharynx normal. Neck supple. No adenopathy or masses in the neck or supraclavicular regions. Sinuses non tender.  S1 and S2 normal, no murmurs, clicks, gallops or rubs. Regular rate and rhythm. Chest is clear; no wheezes or rales. No edema or JVD.  Past labs reviewed with the patient.     Xray - Reviewed and interpreted by me.  Right middle lobe consolidation        Signed Electronically by: Tracy Navarro MD    "

## 2024-12-03 NOTE — TELEPHONE ENCOUNTER
Patient notified of provider message as written.  Patient verbalized understanding.    Patient states she has an appointment in person today as she is very concerned about having Pneumonia.      Kristina Kjellberg, MSN, RN

## 2024-12-03 NOTE — LETTER
December 4, 2024      Lalitha Dey  [address]  [place] [address]        Dear ,    We are writing to inform you of your test results.    Looks like the radiologist saw exactly what we were looking at.  And glad to hear things are little bit better this morning.  Yes, the doxycycline can be a little bit tough on the stomach so you may want to take it with a little bit of food and see if that helps.  If not just let me know.     Resulted Orders   XR Chest 2 Views    Narrative    CHEST TWO VIEWS 12/3/2024 10:47 AM     HISTORY: Acute cough and fever; Acute cough    COMPARISON: 9/17/2012       Impression    IMPRESSION: Focal airspace opacity seen in the right lower lung  suspicious for pneumonia. Recommend radiographic follow-up to ensure  resolution. Normal cardiomediastinal silhouette. No acute bony  abnormality.    ANGELA SCHUSTER MD         SYSTEM ID:  [id number]       If you have any questions or concerns, please call the clinic at the number listed above.       Sincerely,      Tracy Navarro MD

## 2024-12-04 NOTE — TELEPHONE ENCOUNTER
Yes, I would continue with it.  That being said doxycycline can certainly be a bit rough on the stomach.  So she might need to take it with a little bit of food to cushion things.  And if it just is not tolerated we can change to a different antibiotic but I am glad things are starting to improve

## 2024-12-04 NOTE — TELEPHONE ENCOUNTER
If patient is feeling fine now, she can start back on the medications  No need to add additional doses

## 2024-12-04 NOTE — TELEPHONE ENCOUNTER
Patient sent in the following Ramamia message in regards to triage call last night:    Good Morning,     I woke up and am certainly improving so clearly the meds are working. Perhaps I continue to try my morning dose and see if I get nauseous again?      Since I vomited last night do I need to get more meds?     Thanks!  Lalitha            Routing to provider, please review and advise. Fermin Barrett, RN, BSN

## 2024-12-04 NOTE — RESULT ENCOUNTER NOTE
Lalitha Carroll,  Looks like the radiologist saw exactly what we were looking at.  And glad to hear things are little bit better this morning.  Yes, the doxycycline can be a little bit tough on the stomach so you may want to take it with a little bit of food and see if that helps.  If not just let me know.  NANCY Navarro M.D.

## 2024-12-04 NOTE — TELEPHONE ENCOUNTER
Patient active on MyChart, message sent notifying her of Kiara Morelos MD and Tracy Navarro MD response. Fermin Barrett RN, BSN

## 2024-12-04 NOTE — TELEPHONE ENCOUNTER
"Recently diagnosed with strep, began taking Amoxicillin.  Seen in office today: xray showed pneumonia. Amoxicillin was discontinued and instead RX Doxycycline hyclate 100mg twice daily was ordered. I took my first dose @ 12noon. Pharmacist told me to take the next dose @ 8pm, which I did. I immediately felt nausea and vomited @ 9pm. I don't think I kept the medication down, but I did not see the medication in the vomit. I have had diarrhea since this weekend. I am taking Imodium AD per Drs instruction\". She is also menstruating. THOMPSON since Friday, THOMPSON has stopped now. .   \"I vomited x1 tonight so far. Diarrhea today \"all day\". Last time was 5:30pm. It feels like it has gotten better. I last urinated 9pm. I have been drinking a lot of water and Gatorade, Pedialyte. No longer nauseated. T99.7 earlier today. No fever noted now.   I have a new medication for yeast infection (Fluconazole) but haven't started it yet.   No difficulty breathing. Now that I vomited I feel much better. I think it was related to the medication.\"     Triaged to a disposition of Call PCP within 24 hrs.   Patient requests that this message sent to Dr Tracy Navarro that she saw in the office today. She will also call the clinic in am before taking the next dose to discuss first. Home care given per guideline.     Carmita Ayers RN Triage Nurse Advisor 9:38 PM 12/3/2024     Reason for Disposition   Vomiting a prescription medication    Additional Information   Negative: Shock suspected (e.g., cold/pale/clammy skin, too weak to stand, low BP, rapid pulse)   Negative: Difficult to awaken or acting confused (e.g., disoriented, slurred speech)   Negative: Sounds like a life-threatening emergency to the triager   Negative: Vomiting occurs only while coughing   Negative: [1] Pregnant < 20 Weeks AND [2] nausea/vomiting began in early pregnancy (i.e., 4-8 weeks pregnant)   Negative: Chest pain   Negative: Headache is main symptom   Negative: Vomiting (or " "Nausea) in a cancer patient who is currently (or recently) receiving chemotherapy or radiation therapy, or cancer patient who has metastatic or end-stage cancer and is receiving palliative care   Negative: [1] Vomiting AND [2] contains red blood or black (\"coffee ground\") material  (Exception: Few red streaks in vomit that only happened once.)   Negative: Severe pain in one eye   Negative: Recent head injury (within last 3 days)   Negative: Recent abdominal injury (within last 3 days)   Negative: [1] Insulin-dependent diabetes (Type I) AND [2] glucose > 400 mg/dl (22 mmol/l)   Negative: [1] Vomiting AND [2] hernia is more painful or swollen than usual   Negative: [1] SEVERE vomiting (e.g., 6 or more times/day) AND [2] present > 8 hours (Exception: Patient sounds well, is drinking liquids, does not sound dehydrated, and vomiting has lasted less than 24 hours.)   Negative: [1] MODERATE vomiting (e.g., 3 - 5 times/day) AND [2] age > 60 years   Negative: Severe headache (e.g., excruciating)  (Exception: Similar to previous migraines.)   Negative: High-risk adult (e.g., diabetes mellitus, brain tumor, V-P shunt, hernia)   Negative: [1] Drinking very little AND [2] dehydration suspected (e.g., no urine > 12 hours, very dry mouth, very lightheaded)   Negative: Patient sounds very sick or weak to the triager   Negative: [1] Vomiting AND [2] abdomen looks much more swollen than usual   Negative: [1] Vomiting AND [2] contains bile (green color)   Negative: [1] Constant abdominal pain AND [2] present > 2 hours   Negative: [1] Fever > 103 F (39.4 C) AND [2] not able to get the fever down using Fever Care Advice   Negative: [1] Fever > 101 F (38.3 C) AND [2] age > 60 years   Negative: [1] Fever > 100.0 F (37.8 C) AND [2] bedridden (e.g., CVA, chronic illness, recovering from surgery)   Negative: [1] Fever > 100.0 F (37.8 C) AND [2] weak immune system (e.g., HIV positive, cancer chemo, splenectomy, organ transplant, chronic " steroids)   Negative: Taking any of the following medications: digoxin (Lanoxin), lithium, theophylline, phenytoin (Dilantin)   Negative: [1] MILD or MODERATE vomiting AND [2] present > 48 hours (2 days) (Exception: Mild vomiting with associated diarrhea.)   Negative: Fever present > 3 days (72 hours)    Protocols used: Vomiting-A-AH

## 2024-12-04 NOTE — TELEPHONE ENCOUNTER
Patient called to see if she can take some Ondansetron for the nausea, she has a prescription at home for it but wants to confirm ok to take, she is currently taking Imodium right now along with her prescriptions and wants to confirm that this is safe.    Patient also wants to follow up on x-ray. She saw the radiologist recommendation and is wondering if PCP would recommend following up for another x-ray to confirm when things have cleared up. Routing to Tracy Navarro MD, please review and advise. Fermin Barrett RN, BSN

## 2024-12-05 NOTE — TELEPHONE ENCOUNTER
"Nurse Triage SBAR    Is this a 2nd Level Triage? YES, LICENSED PRACTITIONER REVIEW IS REQUIRED    Situation: Patient notes worsening of symptoms for pneumonia.     Background: Patient was seen on 12/3/2024 and diagnosed with pneumonia and strep. She was prescribed doxycycline and fluconazole.    Assessment: Patient reports fever of 100.7. Patient notes numbness/tingling in hands and feet. Patient notes pain in back rating at 9/10. Patient notes chest tightness and shortness of breath that started today. Patient feels like something \"burst\" in her chest. Patient is coughing up green mucus with chunks.     Patient notes it is hard to get up and move around. Patient was able to move around about 3pm, but everything worsened.     Patient notes some diarrhea in the past few weeks, but patient has taken imodium and feels like she is backed up now. Last BM was this am with small amount and was hard.     Tx: Fluconazole, doxycycline, tums    Protocol Recommended Disposition:   GO TO ED NOW:     Recommendation: Recommend emergency room due to new onset of chest pain, trouble breathing, severe back pain and lethargy that started 1 hour ago. Patient says she will try to get to the emergency room, but wants message to be sent to primary care provider for provider input.     Routed to provider    Does the patient meet one of the following criteria for ADS visit consideration? 16+ years old, with an [facility] PCP     TIP  Providers, please consider if this condition is appropriate for management at one of our Acute and Diagnostic Services sites.     If patient is a good candidate, please use dotphrase <dot>triageresponse and select Refer to ADS to document.  Reason for Disposition   New-onset or worsening chest pain    Additional Information   Negative: SEVERE difficulty breathing (e.g., struggling for each breath, speaks in single words)   Negative: Bluish (or gray) lips or face now   Negative: Difficult to awaken or acting confused " "(e.g., disoriented, slurred speech)   Negative: Stridor (harsh sound while breathing in)   Negative: Slow, shallow and weak breathing   Negative: Sounds like a life-threatening emergency to the triager   Negative: Main symptom is coughing up blood (Exception: Blood-tinged sputum.)    Answer Assessment - Initial Assessment Questions  1. SYMPTOM: \"What's the main symptom you're concerned about?\" (e.g., breathing difficulty, fever, weakness)      Back pain, chest tightness, shortness of breath.    2. ONSET: \"When did the  symptosm  start?\"      Worsened today.     3. BETTER-SAME-WORSE: \"Are you getting better, staying the same, or getting worse compared to the day you were discharged?\"      Worse    4. BREATHING DIFFICULTY: \"Are you having any difficulty breathing?\" If Yes, ask: \"How bad is it?\"  (e.g., none, mild, moderate, severe)       Moderate.    5. FEVER: \"Do you have a fever?\" If Yes, ask: \"What is your temperature, how was it measured, and when did it start?\"      100.7    6. SPUTUM: \"Describe the color of your sputum\" (clear, white, yellow, green, blood-tinged)      Green    7. DIAGNOSIS CONFIRMATION: \"When was the pneumonia diagnosed?\" \"By whom?\"      Primary care provider.    8. ANTIBIOTIC: \"Are you taking an antibiotic?\"  If Yes, ask: \"Which one?\" \"When was it started?\"      Doxycycline.     9. OTHER TREATMENT: \"Are you receiving any other treatment for the pneumonia?\" (e.g., albuterol nebulizer, oxygen) If Yes, ask: \"How often?\" and \"Does it help?\"      Doxycycline.     10. HOSPITAL ADMISSION: \"Were you hospitalized for this pneumonia?\" If Yes, ask: \"When were you discharged home from the hospital?\"        No.     11. O2 SATURATION MONITOR:  \"Do you use an oxygen saturation monitor (pulse oximeter) at home?\" If Yes, \"What is your reading (oxygen level) today?\" \"What is your usual oxygen saturation reading?\" (e.g., 95%)        Patient doesn't have one.    Protocols used: Pneumonia Follow-up Call-A-[facility]    "

## 2024-12-05 NOTE — TELEPHONE ENCOUNTER
Called pt and left general VM that she should go in to the ED based on triage disposition.     Per chart review pt did go in to [facility].      Cassandra Ortiz, MELISSAN, RN  [facility]

## 2024-12-06 NOTE — TELEPHONE ENCOUNTER
Yes, I definitely agree that she needs to be seen.  Unfortunately I cannot squeeze her in.  I was not in the office today and I am only in there half a day tomorrow and completely booked.  With the worry is that something else has gone wrong such as a pneumothorax or something else.  And that is above the level of care that we can provide at the clinic.  She might need something like a CT scan or who knows.  So she needs to be seen either through the ADS or the emergency department because this is probably not a simple fix.

## 2024-12-11 ENCOUNTER — TRANSFERRED RECORDS (OUTPATIENT)
Dept: HEALTH INFORMATION MANAGEMENT | Facility: CLINIC | Age: 39
End: 2024-12-11
Payer: COMMERCIAL

## 2024-12-30 ENCOUNTER — ANCILLARY PROCEDURE (OUTPATIENT)
Dept: GENERAL RADIOLOGY | Facility: CLINIC | Age: 39
End: 2024-12-30
Attending: FAMILY MEDICINE
Payer: COMMERCIAL

## 2024-12-30 DIAGNOSIS — R05.1 ACUTE COUGH: ICD-10-CM

## 2024-12-30 PROCEDURE — 71046 X-RAY EXAM CHEST 2 VIEWS: CPT | Mod: TC | Performed by: STUDENT IN AN ORGANIZED HEALTH CARE EDUCATION/TRAINING PROGRAM

## 2025-01-08 ENCOUNTER — TRANSFERRED RECORDS (OUTPATIENT)
Dept: HEALTH INFORMATION MANAGEMENT | Facility: CLINIC | Age: 40
End: 2025-01-08
Payer: COMMERCIAL

## 2025-03-04 ENCOUNTER — TRANSFERRED RECORDS (OUTPATIENT)
Dept: HEALTH INFORMATION MANAGEMENT | Facility: CLINIC | Age: 40
End: 2025-03-04
Payer: COMMERCIAL

## 2025-03-14 ENCOUNTER — OFFICE VISIT (OUTPATIENT)
Dept: FAMILY MEDICINE | Facility: CLINIC | Age: 40
End: 2025-03-14
Attending: FAMILY MEDICINE
Payer: COMMERCIAL

## 2025-03-14 VITALS
TEMPERATURE: 98.3 F | HEART RATE: 75 BPM | SYSTOLIC BLOOD PRESSURE: 111 MMHG | HEIGHT: 64 IN | RESPIRATION RATE: 19 BRPM | WEIGHT: 124.2 LBS | DIASTOLIC BLOOD PRESSURE: 71 MMHG | OXYGEN SATURATION: 100 % | BODY MASS INDEX: 21.21 KG/M2

## 2025-03-14 DIAGNOSIS — J35.8 TONSILLOLITH: ICD-10-CM

## 2025-03-14 DIAGNOSIS — Z12.4 CERVICAL CANCER SCREENING: ICD-10-CM

## 2025-03-14 DIAGNOSIS — E06.3 HYPOTHYROIDISM DUE TO HASHIMOTO'S THYROIDITIS: ICD-10-CM

## 2025-03-14 DIAGNOSIS — Z13.0 SCREENING FOR DEFICIENCY ANEMIA: ICD-10-CM

## 2025-03-14 DIAGNOSIS — Z00.00 ROUTINE GENERAL MEDICAL EXAMINATION AT A HEALTH CARE FACILITY: Primary | ICD-10-CM

## 2025-03-14 DIAGNOSIS — Z13.1 SCREENING FOR DIABETES MELLITUS (DM): ICD-10-CM

## 2025-03-14 DIAGNOSIS — Z13.220 SCREENING FOR HYPERLIPIDEMIA: ICD-10-CM

## 2025-03-14 DIAGNOSIS — L65.9 HAIR LOSS: ICD-10-CM

## 2025-03-14 DIAGNOSIS — K29.70 GASTRITIS WITHOUT BLEEDING, UNSPECIFIED CHRONICITY, UNSPECIFIED GASTRITIS TYPE: ICD-10-CM

## 2025-03-14 DIAGNOSIS — H91.93 HEARING DEFICIT, BILATERAL: ICD-10-CM

## 2025-03-14 DIAGNOSIS — Z13.21 ENCOUNTER FOR VITAMIN DEFICIENCY SCREENING: ICD-10-CM

## 2025-03-14 LAB
ERYTHROCYTE [DISTWIDTH] IN BLOOD BY AUTOMATED COUNT: 12.5 % (ref 10–15)
HCT VFR BLD AUTO: 41.5 % (ref 35–47)
HGB BLD-MCNC: 13.6 G/DL (ref 11.7–15.7)
Lab: NORMAL
MCH RBC QN AUTO: 27.4 PG (ref 26.5–33)
MCHC RBC AUTO-ENTMCNC: 32.8 G/DL (ref 31.5–36.5)
MCV RBC AUTO: 84 FL (ref 78–100)
PERFORMING LABORATORY: NORMAL
PLATELET # BLD AUTO: 307 10E3/UL (ref 150–450)
RBC # BLD AUTO: 4.96 10E6/UL (ref 3.8–5.2)
SPECIMEN STATUS: NORMAL
TEST NAME: NORMAL
WBC # BLD AUTO: 5.1 10E3/UL (ref 4–11)

## 2025-03-14 PROCEDURE — G2211 COMPLEX E/M VISIT ADD ON: HCPCS | Performed by: FAMILY MEDICINE

## 2025-03-14 PROCEDURE — 84443 ASSAY THYROID STIM HORMONE: CPT | Mod: 90 | Performed by: FAMILY MEDICINE

## 2025-03-14 PROCEDURE — 99395 PREV VISIT EST AGE 18-39: CPT | Performed by: FAMILY MEDICINE

## 2025-03-14 PROCEDURE — 82947 ASSAY GLUCOSE BLOOD QUANT: CPT | Mod: 90 | Performed by: FAMILY MEDICINE

## 2025-03-14 PROCEDURE — 3074F SYST BP LT 130 MM HG: CPT | Performed by: FAMILY MEDICINE

## 2025-03-14 PROCEDURE — 82565 ASSAY OF CREATININE: CPT | Mod: 90 | Performed by: FAMILY MEDICINE

## 2025-03-14 PROCEDURE — 84155 ASSAY OF PROTEIN SERUM: CPT | Mod: 90 | Performed by: FAMILY MEDICINE

## 2025-03-14 PROCEDURE — 84439 ASSAY OF FREE THYROXINE: CPT | Mod: 90 | Performed by: FAMILY MEDICINE

## 2025-03-14 PROCEDURE — 80051 ELECTROLYTE PANEL: CPT | Mod: 90 | Performed by: FAMILY MEDICINE

## 2025-03-14 PROCEDURE — 82247 BILIRUBIN TOTAL: CPT | Mod: 90 | Performed by: FAMILY MEDICINE

## 2025-03-14 PROCEDURE — 85027 COMPLETE CBC AUTOMATED: CPT | Performed by: FAMILY MEDICINE

## 2025-03-14 PROCEDURE — 3078F DIAST BP <80 MM HG: CPT | Performed by: FAMILY MEDICINE

## 2025-03-14 PROCEDURE — 99214 OFFICE O/P EST MOD 30 MIN: CPT | Mod: 25 | Performed by: FAMILY MEDICINE

## 2025-03-14 PROCEDURE — 82607 VITAMIN B-12: CPT | Mod: 90 | Performed by: FAMILY MEDICINE

## 2025-03-14 PROCEDURE — 84630 ASSAY OF ZINC: CPT | Mod: 90 | Performed by: FAMILY MEDICINE

## 2025-03-14 PROCEDURE — 36415 COLL VENOUS BLD VENIPUNCTURE: CPT | Performed by: FAMILY MEDICINE

## 2025-03-14 PROCEDURE — 84450 TRANSFERASE (AST) (SGOT): CPT | Mod: 90 | Performed by: FAMILY MEDICINE

## 2025-03-14 PROCEDURE — 84075 ASSAY ALKALINE PHOSPHATASE: CPT | Mod: 90 | Performed by: FAMILY MEDICINE

## 2025-03-14 PROCEDURE — 82040 ASSAY OF SERUM ALBUMIN: CPT | Mod: 90 | Performed by: FAMILY MEDICINE

## 2025-03-14 PROCEDURE — 80061 LIPID PANEL: CPT | Mod: 90 | Performed by: FAMILY MEDICINE

## 2025-03-14 PROCEDURE — 82728 ASSAY OF FERRITIN: CPT | Mod: 90 | Performed by: FAMILY MEDICINE

## 2025-03-14 PROCEDURE — 82306 VITAMIN D 25 HYDROXY: CPT | Mod: 90 | Performed by: FAMILY MEDICINE

## 2025-03-14 PROCEDURE — 84460 ALANINE AMINO (ALT) (SGPT): CPT | Mod: 90 | Performed by: FAMILY MEDICINE

## 2025-03-14 PROCEDURE — 99000 SPECIMEN HANDLING OFFICE-LAB: CPT | Performed by: FAMILY MEDICINE

## 2025-03-14 PROCEDURE — 1126F AMNT PAIN NOTED NONE PRSNT: CPT | Performed by: FAMILY MEDICINE

## 2025-03-14 PROCEDURE — 84520 ASSAY OF UREA NITROGEN: CPT | Mod: 90 | Performed by: FAMILY MEDICINE

## 2025-03-14 SDOH — HEALTH STABILITY: PHYSICAL HEALTH: ON AVERAGE, HOW MANY DAYS PER WEEK DO YOU ENGAGE IN MODERATE TO STRENUOUS EXERCISE (LIKE A BRISK WALK)?: 3 DAYS

## 2025-03-14 ASSESSMENT — PAIN SCALES - GENERAL: PAINLEVEL_OUTOF10: NO PAIN (0)

## 2025-03-14 ASSESSMENT — SOCIAL DETERMINANTS OF HEALTH (SDOH): HOW OFTEN DO YOU GET TOGETHER WITH FRIENDS OR RELATIVES?: MORE THAN THREE TIMES A WEEK

## 2025-03-14 NOTE — PROGRESS NOTES
Preventive Care Visit  [facility]  Kiara Morelos MD, Family Medicine  Mar 14, 2025      Assessment & Plan     Routine general medical examination at a health care facility  Discussed on regular exercises, healthy eating,  and routine dental checks.      Hypothyroidism due to Hashimoto's thyroiditis  TSH   Date Value Ref Range Status   03/14/2025 4.21 (H) 0.30 - 4.20 uIU/mL Final   03/08/2024 2.87 0.30 - 4.20 uIU/mL Final   05/26/2023 4.07 0.30 - 4.20 uIU/mL Final   03/07/2023 3.43 0.40 - 4.00 mU/L Final   02/16/2022 1.98 0.40 - 4.00 mU/L Final   03/02/2021 3.56 0.40 - 4.00 mU/L Final   02/11/2020 1.54 0.40 - 4.00 mU/L Final   09/09/2019 2.23 0.40 - 4.00 mU/L Final   08/02/2019 5.43 (H) 0.40 - 4.00 mU/L Final   10/09/2018 2.15 0.40 - 4.00 mU/L Final     T4 Free   Date Value Ref Range Status   02/11/2020 1.19 0.76 - 1.46 ng/dL Final   09/09/2019 1.13 0.76 - 1.46 ng/dL Final     Free T4   Date Value Ref Range Status   03/14/2025 1.74 (H) 0.90 - 1.70 ng/dL Final   02/16/2022 1.20 0.76 - 1.46 ng/dL Final     Reviewed abnormal thyroid labs including elevated TSH and also elevated free T4 which are not consistent with both hypo or hyperthyroidism at this time   we will continue with current dose of levothyroxine  Recommended to stop taking her elderberry or other herbal supplements which could be complicating the thyroid labs  Repeat labs in 4 weeks to see the trend before adjusting the dose if needed  Refill sent on current dose of levothyroxine 50 mcg daily  -Levothyroxine 50 mcg tablet, take 1 tablet by mouth daily    - TSH WITH FREE T4 REFLEX; Future  - TSH WITH FREE T4 REFLEX    Hearing deficit, bilateral  Recommended to consult audiology for further evaluation  - Adult Audiology  Referral; Future    Hair loss  Check the thyroid levels today to confirm adequacy of replacement  Will also screen for deficiencies on vitamin D, vitamin B12, iron and zinc  - Vitamin B12; Future  -  Vitamin D Deficiency; Future  - Ferritin; Future  - Zinc; Future  - Vitamin B12  - Vitamin D Deficiency  - Ferritin  - Zinc    Cervical cancer screening  Patient is not due for Pap until 2029    Tonsillolith  Reviewed ENT visit note, continue with salt water gargling twice a day and regular mouth rinses    Gastritis without bleeding, unspecified chronicity, unspecified gastritis type  Continue with the [facility] follow-up, avoid food triggers and follow-up reflux precautions    Screening for deficiency anemia    - CBC with platelets; Future  - CBC with platelets    Screening for diabetes mellitus (DM)    - Comprehensive metabolic panel (BMP + Alb, Alk Phos, ALT, AST, Total. Bili, TP); Future  - Comprehensive metabolic panel (BMP + Alb, Alk Phos, ALT, AST, Total. Bili, TP)    Screening for hyperlipidemia    - Lipid panel reflex to direct LDL Fasting; Future  - Lipid panel reflex to direct LDL Fasting    Encounter for vitamin deficiency screening    - Vitamin B12; Future  - Vitamin D Deficiency; Future  - Vitamin B12  - Vitamin D Deficiency            Counseling  Appropriate preventive services were addressed with this patient via screening, questionnaire, or discussion as appropriate for fall prevention, nutrition, physical activity, Tobacco-use cessation, social engagement, weight loss and cognition.  Checklist reviewing preventive services available has been given to the patient.  Reviewed patient's diet, addressing concerns and/or questions.   She is at risk for lack of exercise and has been provided with information to increase physical activity for the benefit of her well-being.       Chart documentation done in part with Dragon Voice recognition Software. Although reviewed after completion, some word and grammatical error may remain.    See Patient Instructions    Subjective   Lalitha is a 39 year old, presenting for the following:  Physical  Pt would like to discuss thyroid concerns, Pt has issues with smell and  taste. Pt wants to check iron would like to discuss levels with provider.          3/14/2025     8:46 AM   Additional Questions   Roomed by Aria MONTANEZ       Hypothyroidism Follow-up    Since last visit, patient describes the following symptoms: Weight stable,  no skin changes, no constipation, no loose stools and hair loss    Hair loss-patient reports losing hair with no concern for hair thinning, scalp itching, abnormal skin rashes  Duration of symptoms-several months  Has underlying history of hypothyroidism    Advance Care Planning  Patient does not have a Health Care Directive: Discussed advance care planning with patient; information given to patient to review.      3/14/2025   General Health   How would you rate your overall physical health? Good   Feel stress (tense, anxious, or unable to sleep) Not at all         3/14/2025   Nutrition   Three or more servings of calcium each day? Yes   Diet: Regular (no restrictions)   How many servings of fruit and vegetables per day? (!) 2-3   How many sweetened beverages each day? 0-1         3/14/2025   Exercise   Days per week of moderate/strenous exercise 3 days         3/14/2025   Social Factors   Frequency of gathering with friends or relatives More than three times a week   Worry food won't last until get money to buy more No   Food not last or not have enough money for food? No   Do you have housing? (Housing is defined as stable permanent housing and does not include staying ouside in a car, in a tent, in an abandoned building, in an overnight shelter, or couch-surfing.) Yes   Are you worried about losing your housing? No   Lack of transportation? No   Unable to get utilities (heat,electricity)? No         3/14/2025   Dental   Dentist two times every year? Yes           3/8/2024   TB Screening   Were you born outside of the [place]? No, in [place]           Today's PHQ-2 Score:       3/14/2025     8:35 AM   PHQ-2 ( 1999 [employer])   Q1: Little interest or pleasure in  doing things 0   Q2: Feeling down, depressed or hopeless 0   PHQ-2 Score 0    Q1: Little interest or pleasure in doing things Not at all   Q2: Feeling down, depressed or hopeless Not at all   PHQ-2 Score 0       Patient-reported           3/14/2025   Substance Use   Alcohol more than 3/day or more than 7/wk No   Do you use any other substances recreationally? No     Social History     Tobacco Use    Smoking status: Former     Current packs/day: 0.00     Types: Cigarettes     Quit date: 2008     Years since quittin.6    Smokeless tobacco: Never    Tobacco comments:     Lives in smoke free household   Vaping Use    Vaping status: Never Used   Substance Use Topics    Alcohol use: Yes     Comment: One drink per month    Drug use: No           2024   LAST FHS-7 RESULTS   1st degree relative breast or ovarian cancer No   Any relative bilateral breast cancer No   Any male have breast cancer No   Any ONE woman have BOTH breast AND ovarian cancer No   Any woman with breast cancer before 50yrs No   2 or more relatives with breast AND/OR ovarian cancer No   2 or more relatives with breast AND/OR bowel cancer No        Mammogram Screening - Patient under 40 years of age: Routine Mammogram Screening not recommended.         3/14/2025   STI Screening   New sexual partner(s) since last STI/HIV test? No     History of abnormal Pap smear: No - age 30- 64 PAP with HPV every 5 years recommended        Latest Ref Rng & Units 3/8/2024     2:38 PM 2019     1:35 PM 2019     1:30 PM   PAP / HPV   PAP  Negative for Intraepithelial Lesion or Malignancy (NILM)      PAP (Historical)   NIL     HPV 16 DNA Negative Negative   Negative    HPV 18 DNA Negative Negative   Negative    Other HR HPV Negative Negative   Negative            3/14/2025   Contraception/Family Planning   Questions about contraception or family planning No        Reviewed and updated as needed this visit by Provider                    Past Medical History:    Diagnosis Date    GERD (gastroesophageal reflux disease)     Hypothyroid     Intradermal nevus 2007    right arm-irritated intradermal nevus     Past Surgical History:   Procedure Laterality Date    MOLE REMOVAL      Several    NO HISTORY OF SURGERY      SALPINGECTOMY Left 2017    Ectopic pregnancy     OB History    Para Term  AB Living   2 1 1 0 0 1   SAB IAB Ectopic Multiple Live Births   0 0 0 0 1      # Outcome Date GA Lbr Chaz/2nd Weight Sex Type Anes PTL Lv   2 Term 18 40w5d  3.742 kg (8 lb 4 oz)  CS-LTranv EPI N LORIN      Complications: Failure to Progress in First Stage      Apgar1: 8  Apgar5: 8   1               Lab work is in process  Labs reviewed in EPIC  BP Readings from Last 3 Encounters:   25 111/71   24 114/63   24 102/69    Wt Readings from Last 3 Encounters:   25 56.3 kg (124 lb 3.2 oz)   24 59.8 kg (131 lb 14.4 oz)   24 55.3 kg (122 lb)                  Patient Active Problem List   Diagnosis    CARDIOVASCULAR SCREENING; LDL GOAL LESS THAN 160    Seasonal allergic rhinitis    Acne    Conjunctivitis, allergic    Dry eyes    Blepharitis    Hypothyroidism    Menometrorrhagia    S/P ectopic pregnancy    Gastroesophageal reflux disease without esophagitis    S/P  section    Encounter for initial prescription of contraceptive pills     delivery delivered    Family history of autoimmune disorder, MS and Lupus in mother    External hemorrhoids    Lipoma of skin and subcutaneous tissue    Anxiety    Breast pain, left    Family history of polyps in the colon    Family history of cancer    Left-sided chest wall pain    Multiple pigmented nevi    Sense of smell altered    Tonsillolith    Hypothyroidism due to Hashimoto's thyroiditis     Past Surgical History:   Procedure Laterality Date    MOLE REMOVAL      Several    NO HISTORY OF SURGERY      SALPINGECTOMY Left 2017    Ectopic pregnancy       Social History      Tobacco Use    Smoking status: Former     Current packs/day: 0.00     Types: Cigarettes     Quit date: 2008     Years since quittin.6    Smokeless tobacco: Never    Tobacco comments:     Lives in smoke free household   Substance Use Topics    Alcohol use: Yes     Comment: One drink per month     Family History   Problem Relation Age of Onset    Neurologic Disorder Mother         MSAND LUPUS    Thyroid Disease Mother     Hashimoto's thyroiditis Mother     Lupus Mother     Multiple Sclerosis Mother     Colon Polyps Mother     Depression Father     Thyroid Disease Father     Depression Brother     Thyroid Disease Maternal Grandmother     Cancer Maternal Grandmother     Thyroid Disease Maternal Grandfather     Thyroid Disease Paternal Grandmother     Thyroid Disease Paternal Grandfather     Diabetes No family hx of     Hypertension No family hx of     Cerebrovascular Disease No family hx of     Glaucoma No family hx of     Macular Degeneration No family hx of     Breast Cancer No family hx of          Current Outpatient Medications   Medication Sig Dispense Refill    Cetirizine HCl (ZYRTEC PO) Take 10 mg by mouth as needed       levothyroxine (SYNTHROID/LEVOTHROID) 50 MCG tablet Take 1 tablet (50 mcg) by mouth daily 90 tablet 3    Multiple Vitamin (MULTIVITAMIN ADULT PO)        Allergies   Allergen Reactions    Doxycycline      Worsening GERD, nausea, vomiting, chest pain    Nkda [No Known Drug Allergy]     Seasonal Allergies      Recent Labs   Lab Test 24  1531 23  0746 23  0911 22  0906 20  1218 19  1427   LDL  --   --   --  79  --   --    HDL  --   --   --  58  --   --    TRIG  --   --   --  64  --   --    ALT  --   --   --   --   --  18   CR 0.65  --   --   --   --  0.78   GFRESTIMATED >90  --   --   --   --  >90   GFRESTBLACK  --   --   --   --   --  >90   POTASSIUM 3.8  --   --   --   --  3.8   TSH 2.87 4.07   < > 1.98   < > 2.23    < > = values in this interval not  "displayed.          Review of Systems  CONSTITUTIONAL: NEGATIVE for fever, chills, change in weight  INTEGUMENTARY/SKIN: As above ,  EYES: NEGATIVE for vision changes or irritation  ENT/MOUTH: Hearing deficit on both sides, history of tonsil lifts  RESP: NEGATIVE for significant cough or SOB  BREAST: NEGATIVE for masses, tenderness or discharge  CV: NEGATIVE for chest pain, palpitations or peripheral edema  GI: NEGATIVE for nausea, abdominal pain, heartburn, or change in bowel habits  : NEGATIVE for frequency, dysuria, or hematuria  MUSCULOSKELETAL: NEGATIVE for significant arthralgias or myalgia  NEURO: NEGATIVE for weakness, dizziness or paresthesias  ENDOCRINE: History of hypothyroidism  HEME: NEGATIVE for bleeding problems  PSYCHIATRIC: NEGATIVE for changes in mood or affect     Objective    Exam  /71 (BP Location: Right arm, Patient Position: Right side, Cuff Size: Adult Regular)   Pulse 75   Temp 98.3  F (36.8  C)   Resp 19   Ht 1.626 m (5' 4\")   Wt 56.3 kg (124 lb 3.2 oz)   LMP 02/22/2025   SpO2 100%   BMI 21.32 kg/m     Estimated body mass index is 21.32 kg/m  as calculated from the following:    Height as of this encounter: 1.626 m (5' 4\").    Weight as of this encounter: 56.3 kg (124 lb 3.2 oz).    Physical Exam  GENERAL: alert and no distress  EYES: Eyes grossly normal to inspection, PERRL and conjunctivae and sclerae normal  HENT: ear canals and TM's normal, nose and mouth without ulcers or lesions  NECK: no adenopathy, no asymmetry, masses, or scars  RESP: lungs clear to auscultation - no rales, rhonchi or wheezes  BREAST: normal without masses, tenderness or nipple discharge and no palpable axillary masses or adenopathy  CV: regular rate and rhythm, normal S1 S2, no S3 or S4, no murmur, click or rub, no peripheral edema  ABDOMEN: soft, nontender, no hepatosplenomegaly, no masses and bowel sounds normal  MS: no gross musculoskeletal defects noted, no edema  SKIN: no suspicious lesions or " brenda  NEURO: Normal strength and tone, mentation intact and speech normal  PSYCH: mentation appears normal, affect normal/bright        Signed Electronically by: Kiara Morelos MD

## 2025-03-14 NOTE — PATIENT INSTRUCTIONS
Patient Education   Preventive Care Advice   This is general advice given by our system to help you stay healthy. However, your care team may have specific advice just for you. Please talk to your care team about your preventive care needs.  Nutrition  Eat 5 or more servings of fruits and vegetables each day.  Try wheat bread, brown rice and whole grain pasta (instead of white bread, rice, and pasta).  Get enough calcium and vitamin D. Check the label on foods and aim for 100% of the RDA (recommended daily allowance).  Lifestyle  Exercise at least 150 minutes each week  (30 minutes a day, 5 days a week).  Do muscle strengthening activities 2 days a week. These help control your weight and prevent disease.  No smoking.  Wear sunscreen to prevent skin cancer.  Have a dental exam and cleaning every 6 months.  Yearly exams  See your health care team every year to talk about:  Any changes in your health.  Any medicines your care team has prescribed.  Preventive care, family planning, and ways to prevent chronic diseases.  Shots (vaccines)   HPV shots (up to age 26), if you've never had them before.  Hepatitis B shots (up to age 59), if you've never had them before.  COVID-19 shot: Get this shot when it's due.  Flu shot: Get a flu shot every year.  Tetanus shot: Get a tetanus shot every 10 years.  Pneumococcal, hepatitis A, and RSV shots: Ask your care team if you need these based on your risk.  Shingles shot (for age 50 and up)  General health tests  Diabetes screening:  Starting at age 35, Get screened for diabetes at least every 3 years.  If you are younger than age 35, ask your care team if you should be screened for diabetes.  Cholesterol test: At age 39, start having a cholesterol test every 5 years, or more often if advised.  Bone density scan (DEXA): At age 50, ask your care team if you should have this scan for osteoporosis (brittle bones).  Hepatitis C: Get tested at least once in your life.  STIs (sexually  transmitted infections)  Before age 24: Ask your care team if you should be screened for STIs.  After age 24: Get screened for STIs if you're at risk. You are at risk for STIs (including HIV) if:  You are sexually active with more than one person.  You don't use condoms every time.  You or a partner was diagnosed with a sexually transmitted infection.  If you are at risk for HIV, ask about PrEP medicine to prevent HIV.  Get tested for HIV at least once in your life, whether you are at risk for HIV or not.  Cancer screening tests  Cervical cancer screening: If you have a cervix, begin getting regular cervical cancer screening tests starting at age 21.  Breast cancer scan (mammogram): If you've ever had breasts, begin having regular mammograms starting at age 40. This is a scan to check for breast cancer.  Colon cancer screening: It is important to start screening for colon cancer at age 45.  Have a colonoscopy test every 10 years (or more often if you're at risk) Or, ask your provider about stool tests like a FIT test every year or Cologuard test every 3 years.  To learn more about your testing options, visit:   .  For help making a decision, visit:   https://bit.ly/xr04325.  Prostate cancer screening test: If you have a prostate, ask your care team if a prostate cancer screening test (PSA) at age 55 is right for you.  Lung cancer screening: If you are a current or former smoker ages 50 to 80, ask your care team if ongoing lung cancer screenings are right for you.  For informational purposes only. Not to replace the advice of your health care provider. Copyright   2023 [employer]. All rights reserved. Clinically reviewed by the [facility]. [employer] [id number] - REV 01/24.

## 2025-03-15 LAB — ZINC SERPL-MCNC: 97.4 UG/DL

## 2025-03-16 ENCOUNTER — MYC MEDICAL ADVICE (OUTPATIENT)
Dept: FAMILY MEDICINE | Facility: CLINIC | Age: 40
End: 2025-03-16
Payer: COMMERCIAL

## 2025-03-16 LAB
ALBUMIN SERPL BCG-MCNC: 4.5 G/DL (ref 3.5–5.2)
ALP SERPL-CCNC: 50 U/L (ref 40–150)
ALT SERPL W P-5'-P-CCNC: 8 U/L (ref 0–50)
ANION GAP SERPL CALCULATED.3IONS-SCNC: 13 MMOL/L (ref 7–15)
AST SERPL W P-5'-P-CCNC: 17 U/L (ref 0–45)
BILIRUB SERPL-MCNC: 0.6 MG/DL
BUN SERPL-MCNC: 11.3 MG/DL (ref 6–20)
CALCIUM SERPL-MCNC: NORMAL MG/DL
CHLORIDE SERPL-SCNC: 100 MMOL/L (ref 98–107)
CHOLEST SERPL-MCNC: 170 MG/DL
CREAT SERPL-MCNC: 0.6 MG/DL (ref 0.51–0.95)
EGFRCR SERPLBLD CKD-EPI 2021: >90 ML/MIN/1.73M2
FASTING STATUS PATIENT QL REPORTED: YES
FASTING STATUS PATIENT QL REPORTED: YES
FERRITIN SERPL-MCNC: 23 NG/ML (ref 6–175)
GLUCOSE SERPL-MCNC: 82 MG/DL (ref 70–99)
HCO3 SERPL-SCNC: 23 MMOL/L (ref 22–29)
HDLC SERPL-MCNC: 58 MG/DL
LDLC SERPL CALC-MCNC: 99 MG/DL
NONHDLC SERPL-MCNC: 112 MG/DL
POTASSIUM SERPL-SCNC: 4.1 MMOL/L (ref 3.4–5.3)
PROT SERPL-MCNC: 7.9 G/DL (ref 6.4–8.3)
SODIUM SERPL-SCNC: 136 MMOL/L (ref 135–145)
T4 FREE SERPL-MCNC: 1.74 NG/DL (ref 0.9–1.7)
TRIGL SERPL-MCNC: 65 MG/DL
TSH SERPL DL<=0.005 MIU/L-ACNC: 4.21 UIU/ML (ref 0.3–4.2)
VIT B12 SERPL-MCNC: 472 PG/ML (ref 232–1245)
VIT D+METAB SERPL-MCNC: 34 NG/ML (ref 20–50)

## 2025-03-16 RX ORDER — LEVOTHYROXINE SODIUM 50 UG/1
50 TABLET ORAL DAILY
Qty: 90 TABLET | Refills: 3 | Status: SHIPPED | OUTPATIENT
Start: 2025-03-16 | End: 2025-03-20

## 2025-03-16 NOTE — RESULT ENCOUNTER NOTE
Glen Doty,  Your labs are normal except for thyroid.  Your thyroid labs showed both elevated TSH as if on underactive thyroid and elevated free t4 as in overactive thyroid.  I would recommend to stop all herbal supplements including the elderberry that you have been taking and repeat the thyroid labs in a month to see the trend before adjusting medications.  I put the lab order for thyroid to be repeated in 4 weeks.   Let me know if you have any questions. Take care.  Kiara Morelos MD

## 2025-03-17 LAB — MISCELLANEOUS TEST 1 (ARUP): NORMAL

## 2025-03-17 NOTE — TELEPHONE ENCOUNTER
Patient has follow up questions/comments from 3/14/25 visit. Routing Notable [employer] message to provider, please review and advise. Fermin Barrett RN, BSN

## 2025-03-20 DIAGNOSIS — E06.3 HYPOTHYROIDISM DUE TO HASHIMOTO'S THYROIDITIS: ICD-10-CM

## 2025-03-20 RX ORDER — LEVOTHYROXINE SODIUM 50 UG/1
50 TABLET ORAL DAILY
Qty: 90 TABLET | Refills: 1 | Status: SHIPPED | OUTPATIENT
Start: 2025-03-20

## 2025-03-20 NOTE — TELEPHONE ENCOUNTER
I recommend she gets in person visit for evaluation in clinic or urgent care for the swollen gland/headache concern. They are probably not related to her thyroid issue.

## 2025-03-21 NOTE — TELEPHONE ENCOUNTER
Writer attempted to contact Lalitha Dey on March 21, 2025.     Reason for call - Try to schedule sooner since Dr. Garcia mentioned to be seen in person.  Writer left message for patient to call [facility] back at [phone].    If patient calls back:  Please see if 4/23/25 appointment with Dr. Morelos works for her as writer scheduled her with her PCP instead of a covering provider. If so, please cancel appt. With Dr. Navarro on 4/28. MyChart also being sent to patient regarding schedule.     Axel FRANKLIN RN  Clinical Triage/Primary Care  [facility]

## 2025-04-01 ENCOUNTER — MYC MEDICAL ADVICE (OUTPATIENT)
Dept: FAMILY MEDICINE | Facility: CLINIC | Age: 40
End: 2025-04-01
Payer: COMMERCIAL

## 2025-04-01 DIAGNOSIS — E06.3 HYPOTHYROIDISM DUE TO HASHIMOTO'S THYROIDITIS: Primary | ICD-10-CM

## 2025-04-02 NOTE — TELEPHONE ENCOUNTER
Routing to provider for additional review. Patient did discuss concerns at her recent annual. Please review and advise. Kristin Barrett RN, BSN

## 2025-04-16 ENCOUNTER — LAB (OUTPATIENT)
Dept: LAB | Facility: CLINIC | Age: 40
End: 2025-04-16
Attending: FAMILY MEDICINE
Payer: COMMERCIAL

## 2025-04-16 DIAGNOSIS — E06.3 HYPOTHYROIDISM DUE TO HASHIMOTO'S THYROIDITIS: ICD-10-CM

## 2025-04-16 LAB
T4 FREE SERPL-MCNC: 1.44 NG/DL (ref 0.9–1.7)
TSH SERPL DL<=0.005 MIU/L-ACNC: 3.07 UIU/ML (ref 0.3–4.2)

## 2025-04-16 PROCEDURE — 36415 COLL VENOUS BLD VENIPUNCTURE: CPT

## 2025-04-16 PROCEDURE — 84439 ASSAY OF FREE THYROXINE: CPT

## 2025-04-16 PROCEDURE — 84443 ASSAY THYROID STIM HORMONE: CPT

## 2025-04-16 RX ORDER — LEVOTHYROXINE SODIUM 50 UG/1
50 TABLET ORAL DAILY
Qty: 90 TABLET | Refills: 3 | Status: SHIPPED | OUTPATIENT
Start: 2025-04-16

## 2025-04-16 NOTE — RESULT ENCOUNTER NOTE
Glen Doty,  Your recent thyroid labs are both in normal range, this is reassuring.  Let us continue with current dose of levothyroxine with no change.   Let me know if you have any questions. Take care.  Kiara Morelos MD

## 2025-04-23 ENCOUNTER — OFFICE VISIT (OUTPATIENT)
Dept: FAMILY MEDICINE | Facility: CLINIC | Age: 40
End: 2025-04-23
Payer: COMMERCIAL

## 2025-04-23 VITALS
WEIGHT: 125.9 LBS | TEMPERATURE: 97.8 F | HEIGHT: 65 IN | HEART RATE: 72 BPM | BODY MASS INDEX: 20.98 KG/M2 | OXYGEN SATURATION: 100 % | DIASTOLIC BLOOD PRESSURE: 69 MMHG | SYSTOLIC BLOOD PRESSURE: 108 MMHG | RESPIRATION RATE: 16 BRPM

## 2025-04-23 DIAGNOSIS — E03.9 ACQUIRED HYPOTHYROIDISM: Primary | ICD-10-CM

## 2025-04-23 PROCEDURE — 3074F SYST BP LT 130 MM HG: CPT | Performed by: FAMILY MEDICINE

## 2025-04-23 PROCEDURE — 1126F AMNT PAIN NOTED NONE PRSNT: CPT | Performed by: FAMILY MEDICINE

## 2025-04-23 PROCEDURE — 99207 PR NO CHARGE LOS: CPT | Performed by: FAMILY MEDICINE

## 2025-04-23 PROCEDURE — 3078F DIAST BP <80 MM HG: CPT | Performed by: FAMILY MEDICINE

## 2025-04-23 ASSESSMENT — PAIN SCALES - GENERAL: PAINLEVEL_OUTOF10: NO PAIN (0)

## 2025-04-23 NOTE — PROGRESS NOTES
"  {PROVIDER CHARTING PREFERENCE:364397}    Subjective   Lalitha is a 39 year old, presenting for the following health issues:  Follow Up (Gland / Thyroid)        4/23/2025    11:02 AM   Additional Questions   Roomed by Axel   Accompanied by Self         4/23/2025    11:02 AM   Patient Reported Additional Medications   Patient reports taking the following new medications None     History of Present Illness       Hypothyroidism:     Since last visit, patient describes the following symptoms::  Hair loss    She eats 2-3 servings of fruits and vegetables daily.She consumes 0 sweetened beverage(s) daily.She exercises with enough effort to increase her heart rate 10 to 19 minutes per day.  She exercises with enough effort to increase her heart rate 3 or less days per week.   She is taking medications regularly.        {MA/LPN/RN Pre-Provider Visit Orders- hCG/UA/Strep (Optional):591393}  {SUPERLIST (Optional):824099}  {additonal problems for provider to add (Optional):109569}    {ROS Picklists (Optional):422762}      Objective    /69 (BP Location: Right arm, Patient Position: Sitting, Cuff Size: Adult Regular)   Pulse 72   Temp 97.8  F (36.6  C) (Tympanic)   Resp 16   Ht 1.638 m (5' 4.5\")   Wt 57.1 kg (125 lb 14.4 oz)   LMP 04/16/2025 (Exact Date)   SpO2 100%   BMI 21.28 kg/m    Body mass index is 21.28 kg/m .  Physical Exam   {Exam List (Optional):497185}    {Diagnostic Test Results (Optional):391461}        Signed Electronically by: Kiara Morelos MD  {Email feedback regarding this note to [email or web address]   :[id number]}  "

## 2025-04-23 NOTE — PROGRESS NOTES
Patient reports, she has scheduled this visit at the request of the staff, and she needed a endocrinology referral for her hypothyroidism, endocrinology referral is already in the system and patient has appointment with endocrinology team next week.  She does not need this visit  This will be a no charge encounter

## 2025-05-05 ENCOUNTER — OFFICE VISIT (OUTPATIENT)
Dept: ENDOCRINOLOGY | Facility: CLINIC | Age: 40
End: 2025-05-05
Attending: NURSE PRACTITIONER
Payer: COMMERCIAL

## 2025-05-05 VITALS
RESPIRATION RATE: 16 BRPM | HEART RATE: 77 BPM | WEIGHT: 123 LBS | SYSTOLIC BLOOD PRESSURE: 110 MMHG | BODY MASS INDEX: 20.79 KG/M2 | OXYGEN SATURATION: 98 % | DIASTOLIC BLOOD PRESSURE: 65 MMHG

## 2025-05-05 DIAGNOSIS — E06.3 HYPOTHYROIDISM DUE TO HASHIMOTO'S THYROIDITIS: ICD-10-CM

## 2025-05-05 PROCEDURE — 3078F DIAST BP <80 MM HG: CPT | Performed by: INTERNAL MEDICINE

## 2025-05-05 PROCEDURE — 99204 OFFICE O/P NEW MOD 45 MIN: CPT | Performed by: INTERNAL MEDICINE

## 2025-05-05 PROCEDURE — 3074F SYST BP LT 130 MM HG: CPT | Performed by: INTERNAL MEDICINE

## 2025-05-05 NOTE — PATIENT INSTRUCTIONS
Lab in June    If you have any questions, please do not hesitate to call [facility] at [phone] and ask for Endocrinology clinic.    Sincerely,    Lawrence Azul MD  Endocrinology

## 2025-05-05 NOTE — PROGRESS NOTES
Endocrinology Note         Lalitha is a 39 year old female presents today for hypothyroidism due to Hashimoto's thyroiditis.    HPI  Lalitha is a 39 year old female presents today for hypothyroidism due to Hashimoto's thyroiditis    She was diagnosed with hypothyroidism for a long time.  She also had positive antibody for Hashimoto.She has been on levothyroxine 50 mcg daily.  Dose has been stable for a long time until March 2025 when TSH was 4.2 and free T4 was 1.74.  Stated in February 2025, she was sick likely flu.  She noticed neck swelling that is now gone.  She is still having increasing hair loss and constipation.  She stated she sometime feels cold and sometimes anxious.      Labs in April 2025 showed TSH 3.07, free T4 1.44.  She continue on levothyroxine 50 mcg daily.    She has strong family history of hypo- and hyperthyroidism in both father and mother side.    Past Medical History  Past Medical History:   Diagnosis Date    GERD (gastroesophageal reflux disease)     Hypothyroid     Intradermal nevus 03/27/2007    right arm-irritated intradermal nevus       Allergies  Allergies   Allergen Reactions    Doxycycline      Worsening GERD, nausea, vomiting, chest pain    Nkda [No Known Drug Allergy]     Seasonal Allergies      Medications  Current Outpatient Medications   Medication Sig Dispense Refill    Cetirizine HCl (ZYRTEC PO) Take 10 mg by mouth as needed       levothyroxine (SYNTHROID/LEVOTHROID) 50 MCG tablet Take 1 tablet (50 mcg) by mouth daily. 90 tablet 3    Multiple Vitamin (MULTIVITAMIN ADULT PO)        Family History  family history includes Cancer in her maternal grandmother; Colon Polyps in her mother; Depression in her brother and father; Hashimoto's thyroiditis in her mother; Lupus in her mother; Multiple Sclerosis in her mother; Neurologic Disorder in her mother; Thyroid Disease in her father, maternal grandfather, maternal grandmother, mother, paternal grandfather, and paternal  grandmother.    Social History  Social History     Tobacco Use    Smoking status: Former     Current packs/day: 0.00     Types: Cigarettes     Quit date: 2008     Years since quittin.7    Smokeless tobacco: Never    Tobacco comments:     Lives in smoke free household   Vaping Use    Vaping status: Never Used   Substance Use Topics    Alcohol use: Yes     Comment: One drink per month    Drug use: No   No smoking, no alcohol    ROS  10 points ROS were negative otherwise mentioned in HPI    Physical Exam  /65   Pulse 77   Resp 16   Wt 55.8 kg (123 lb)   LMP 2025 (Exact Date)   SpO2 98%   BMI 20.79 kg/m    Body mass index is 20.79 kg/m .  Constitutional: no distress, comfortable, pleasant   Eyes: anicteric, normal extra-ocular movements, no lid lag or retraction  Neck: no thyromegaly, no discrete nodule  Cardiovascular: regular rate and rhythm, normal S1 and S2, no murmurs  Respiratory: clear to auscultation, no wheezes or crackles, normal breath sounds   Gastrointestinal:  nontender, no hepatomegaly, no masses   Musculoskeletal: no edema   Skin:  no jaundice   Neurological: cranial nerves intact, normal gait, no tremor on outstretched hands bilaterally  Psychological: appropriate mood   Lymphatic: no cervical  lymphadenopathy.      RESULTS  I have personally reviewed labs and images. I also reviewed labs with patient and discussed the result and plan of care.        ASSESSMENT:    Lalitha is a 39 year old female presents today for hypothyroidism due to Hashimoto's thyroiditis    1) Hypothyroidism secondary to Hashimoto thyroiditis: long standing. Has been on the dose of levothyroxine 50 mcg daily.  Labs in 2025 showed mild abnormal with TSH of 4.21 and free T4 of 1.74.  Report being sick with flu in February and noticed neck swelling.  Now her symptom has improved except constipation and hair loss.  Labs in 2025 showed improvement with TSH of 3.07 and free T4 of 1.44.    Suspect  that she may have subacute thyroiditis in February and now recovered.  I cannot feel any thyroid enlargement.  I recommend to repeat labs next month.  TSH is still in about 3-4 range, I recommend to slightly increase dose of levothyroxine.    PLAN:   -Recommend to continue with levothyroxine 50 mcg daily  -Recommend to repeat TFT in June  - Can follow-up with primary care physician    Lawrence Azul MD  Division of Diabetes and Endocrinology  Department of Medicine

## 2025-05-05 NOTE — NURSING NOTE
Lalitha Dey's goals for this visit include:   Chief Complaint   Patient presents with    New Patient    Thyroid Problem       She requests these members of her care team be copied on today's visit information: yes    PCP: Kiara Morelos    Referring Provider:  Chica Cortez, APRN CNP  [address] [place] [address]    /65   Pulse 77   Resp 16   Wt 55.8 kg (123 lb)   LMP 04/16/2025 (Exact Date)   SpO2 98%   BMI 20.79 kg/m      Do you need any medication refills at today's visit? None    Valdez Evans, EMT

## 2025-05-05 NOTE — LETTER
5/5/2025      Lalitha Dey  [address]  [place] [address]      Dear Colleague,    Thank you for referring your patient, Lalitha Dey, to the [facility]. Please see a copy of my visit note below.         Endocrinology Note         Lalitha is a 39 year old female presents today for hypothyroidism due to Hashimoto's thyroiditis.    HPI  Lalitha is a 39 year old female presents today for hypothyroidism due to Hashimoto's thyroiditis    She was diagnosed with hypothyroidism for a long time.  She also had positive antibody for Hashimoto.She has been on levothyroxine 50 mcg daily.  Dose has been stable for a long time until March 2025 when TSH was 4.2 and free T4 was 1.74.  Stated in February 2025, she was sick likely flu.  She noticed neck swelling that is now gone.  She is still having increasing hair loss and constipation.  She stated she sometime feels cold and sometimes anxious.      Labs in April 2025 showed TSH 3.07, free T4 1.44.  She continue on levothyroxine 50 mcg daily.    She has strong family history of hypo- and hyperthyroidism in both father and mother side.    Past Medical History  Past Medical History:   Diagnosis Date    GERD (gastroesophageal reflux disease)     Hypothyroid     Intradermal nevus 03/27/2007    right arm-irritated intradermal nevus       Allergies  Allergies   Allergen Reactions    Doxycycline      Worsening GERD, nausea, vomiting, chest pain    Nkda [No Known Drug Allergy]     Seasonal Allergies      Medications  Current Outpatient Medications   Medication Sig Dispense Refill    Cetirizine HCl (ZYRTEC PO) Take 10 mg by mouth as needed       levothyroxine (SYNTHROID/LEVOTHROID) 50 MCG tablet Take 1 tablet (50 mcg) by mouth daily. 90 tablet 3    Multiple Vitamin (MULTIVITAMIN ADULT PO)        Family History  family history includes Cancer in her maternal grandmother; Colon Polyps in her mother; Depression in her brother and father; Hashimoto's thyroiditis  in her mother; Lupus in her mother; Multiple Sclerosis in her mother; Neurologic Disorder in her mother; Thyroid Disease in her father, maternal grandfather, maternal grandmother, mother, paternal grandfather, and paternal grandmother.    Social History  Social History     Tobacco Use    Smoking status: Former     Current packs/day: 0.00     Types: Cigarettes     Quit date: 2008     Years since quittin.7    Smokeless tobacco: Never    Tobacco comments:     Lives in smoke free household   Vaping Use    Vaping status: Never Used   Substance Use Topics    Alcohol use: Yes     Comment: One drink per month    Drug use: No   No smoking, no alcohol    ROS  10 points ROS were negative otherwise mentioned in HPI    Physical Exam  /65   Pulse 77   Resp 16   Wt 55.8 kg (123 lb)   LMP 2025 (Exact Date)   SpO2 98%   BMI 20.79 kg/m    Body mass index is 20.79 kg/m .  Constitutional: no distress, comfortable, pleasant   Eyes: anicteric, normal extra-ocular movements, no lid lag or retraction  Neck: no thyromegaly, no discrete nodule  Cardiovascular: regular rate and rhythm, normal S1 and S2, no murmurs  Respiratory: clear to auscultation, no wheezes or crackles, normal breath sounds   Gastrointestinal:  nontender, no hepatomegaly, no masses   Musculoskeletal: no edema   Skin:  no jaundice   Neurological: cranial nerves intact, normal gait, no tremor on outstretched hands bilaterally  Psychological: appropriate mood   Lymphatic: no cervical  lymphadenopathy.      RESULTS  I have personally reviewed labs and images. I also reviewed labs with patient and discussed the result and plan of care.        ASSESSMENT:    Lalitha is a 39 year old female presents today for hypothyroidism due to Hashimoto's thyroiditis    1) Hypothyroidism secondary to Hashimoto thyroiditis: long standing. Has been on the dose of levothyroxine 50 mcg daily.  Labs in 2025 showed mild abnormal with TSH of 4.21 and free T4 of 1.74.   Report being sick with flu in February and noticed neck swelling.  Now her symptom has improved except constipation and hair loss.  Labs in April 2025 showed improvement with TSH of 3.07 and free T4 of 1.44.    Suspect that she may have subacute thyroiditis in February and now recovered.  I cannot feel any thyroid enlargement.  I recommend to repeat labs next month.  TSH is still in about 3-4 range, I recommend to slightly increase dose of levothyroxine.    PLAN:   -Recommend to continue with levothyroxine 50 mcg daily  -Recommend to repeat TFT in June  - Can follow-up with primary care physician    Lawrence Azul MD  Division of Diabetes and Endocrinology  Department of Medicine      Again, thank you for allowing me to participate in the care of your patient.        Sincerely,        Lawrence Azul MD    Electronically signed

## 2025-06-16 ENCOUNTER — LAB (OUTPATIENT)
Dept: LAB | Facility: CLINIC | Age: 40
End: 2025-06-16
Payer: COMMERCIAL

## 2025-06-16 DIAGNOSIS — E06.3 HYPOTHYROIDISM DUE TO HASHIMOTO'S THYROIDITIS: ICD-10-CM

## 2025-06-16 PROCEDURE — 36415 COLL VENOUS BLD VENIPUNCTURE: CPT

## 2025-06-16 PROCEDURE — 84439 ASSAY OF FREE THYROXINE: CPT

## 2025-06-16 PROCEDURE — 84443 ASSAY THYROID STIM HORMONE: CPT

## 2025-06-16 PROCEDURE — 86364 TISS TRNSGLTMNASE EA IG CLAS: CPT

## 2025-06-17 LAB
T4 FREE SERPL-MCNC: 1.46 NG/DL (ref 0.9–1.7)
TSH SERPL DL<=0.005 MIU/L-ACNC: 3.04 UIU/ML (ref 0.3–4.2)

## 2025-06-18 ENCOUNTER — OFFICE VISIT (OUTPATIENT)
Dept: AUDIOLOGY | Facility: CLINIC | Age: 40
End: 2025-06-18
Attending: FAMILY MEDICINE
Payer: COMMERCIAL

## 2025-06-18 DIAGNOSIS — H91.93 HEARING DEFICIT, BILATERAL: ICD-10-CM

## 2025-06-18 DIAGNOSIS — H93.292 ABNORMAL AUDITORY PERCEPTION OF LEFT EAR: Primary | ICD-10-CM

## 2025-06-18 LAB
TTG IGA SER-ACNC: 0.3 U/ML
TTG IGG SER-ACNC: <0.6 U/ML

## 2025-06-18 PROCEDURE — 92550 TYMPANOMETRY & REFLEX THRESH: CPT | Performed by: AUDIOLOGIST

## 2025-06-18 PROCEDURE — 92557 COMPREHENSIVE HEARING TEST: CPT | Performed by: AUDIOLOGIST

## 2025-06-18 NOTE — PROGRESS NOTES
AUDIOLOGY REPORT    SUBJECTIVE:  Lalitha Dey is a 40 year old female who was seen in the Audiology Clinic at the [facility] for audiologic evaluation, referred by Kiara Morelos M.D. The patient reports a fluctuating aural fullness, tinnitus, and suspected hearing fluctuations in her left ear. The patient denies  otalgia, otorrhea, and dizziness.  .    OBJECTIVE:  Falls Risk Screening  Falls Risk Completed by: Audiology  Have you fallen 2 or more times in the past year? No  Have you fallen and had an injury in the past year? No  Is the patient receiving Physical Therapy services? No  Fall Screen Comments:          Otoscopic exam indicates ears are clear of cerumen bilaterally     Pure Tone Thresholds assessed using conventional audiometry with good  reliability from 250-8000 Hz bilaterally using circumaural headphones     RIGHT:  normal hearing    LEFT:    normal hearing    Tympanogram:    RIGHT: normal eardrum mobility    LEFT:   normal eardrum mobility    Reflexes (reported by stimulus ear):  RIGHT: Ipsilateral is present at normal levels  RIGHT: Contralateral is present at normal levels  LEFT:   Ipsilateral is present at normal levels  LEFT:   Contralateral is present at normal levels      Speech Reception Threshold:    RIGHT: 5 dB HL    LEFT:   5 dB HL  Word Recognition Score:     RIGHT: 100% at 45 dB HL using NU-6 recorded word list.    LEFT:   100% at 45 dB HL using NU-6 recorded word list.      ASSESSMENT:     ICD-10-CM    1. Hearing deficit, bilateral  H91.93 Adult Audiology  Referral          Today s results were discussed with the patient in detail.     PLAN: It is recommended that the patient follow up with ENT.  Please call this clinic with questions regarding these results or recommendations.        Morgan Rosen.  Doctor of Audiology  [place] License # [id number]

## 2025-06-25 ENCOUNTER — RESULTS FOLLOW-UP (OUTPATIENT)
Dept: ENDOCRINOLOGY | Facility: CLINIC | Age: 40
End: 2025-06-25

## 2025-07-09 ENCOUNTER — MYC MEDICAL ADVICE (OUTPATIENT)
Dept: FAMILY MEDICINE | Facility: CLINIC | Age: 40
End: 2025-07-09
Payer: COMMERCIAL

## 2025-07-16 NOTE — PROGRESS NOTES
Chief Complaint   Patient presents with    Consult     Tinnitus, pressure, plugged, fluid feeling in left ear. Onset 2017 after stopped taking Zyrtec symptoms were worse, but doesn't go away completely with Zyrtec.         PCP: Kiara Morelos     Referring Provider: No ref. provider found    There were no vitals taken for this visit.    ENT Problem List:  Patient Active Problem List   Diagnosis    CARDIOVASCULAR SCREENING; LDL GOAL LESS THAN 160    Seasonal allergic rhinitis    Acne    Conjunctivitis, allergic    Dry eyes    Blepharitis    Hypothyroidism    Menometrorrhagia    S/P ectopic pregnancy    Gastroesophageal reflux disease without esophagitis    S/P  section    Encounter for initial prescription of contraceptive pills     delivery delivered    Family history of autoimmune disorder, MS and Lupus in mother    External hemorrhoids    Lipoma of skin and subcutaneous tissue    Anxiety    Breast pain, left    Family history of polyps in the colon    Family history of cancer    Left-sided chest wall pain    Multiple pigmented nevi    Sense of smell altered    Tonsillolith    Hypothyroidism due to Hashimoto's thyroiditis      Current Medications:  Current Outpatient Medications   Medication Sig Dispense Refill    Cetirizine HCl (ZYRTEC PO) Take 10 mg by mouth as needed       levothyroxine (SYNTHROID/LEVOTHROID) 50 MCG tablet Take 1 tablet (50 mcg) by mouth daily. 90 tablet 3     No current facility-administered medications for this visit.     Surgical History:  Past Surgical History:   Procedure Laterality Date    MOLE REMOVAL      Several    NO HISTORY OF SURGERY      SALPINGECTOMY Left 2017    Ectopic pregnancy       Imaging/Labs/Outside Procedures/Balance Testing/Sleep Study/EGD/Etc.  2023 MR Brain W/O & W Contrast  Provided History: phantom smell, check olfactory area in brain, sinus  disease; Phantosmia.     Comparison: None available.     Technique: Multiplanar T1-weighted,  axial FLAIR, and susceptibility  images were obtained without intravenous contrast. Following  intravenous gadolinium-based contrast administration, axial  T2-weighted, diffusion, and T1-weighted images (in multiple planes)  were obtained.     Contrast: 6 mL IV Gadavist      Findings:  There is no mass or abnormal postcontrast enhancement involving the  cribriform plate, olfactory nerves or ethmoid air cells.     There is no mass effect, midline shift, or evidence of intracranial  hemorrhage. The ventricles are proportionate to the cerebral sulci.  Normal major vascular intracranial flow-voids.     Postcontrast images demonstrate no abnormal intracranial enhancement.     No abnormality of the skull marrow signal. The visualized portions of  paranasal sinuses are relatively clear. Normal nasal cavity. Trace  dependent right mastoid effusion. The orbits are grossly unremarkable.                                                                      Impression:  1. No abnormalities of the olfactory nerves, cribriform plate or  ethmoid air cells.  2. No acute intracranial pathology.    HPI  Pleasant 40 year old female presents today as a(n) new patient for tinnitus and left ear fullness. 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 She was also seen by Luigi Rosen 6/18/25 with audiogram performed.     Today she reports experiencing symptoms of left ear fullness and associated tinnitus. Symptoms have been ongoing for several years following discontinuation Zyrtec while pregnant in 2017. States while talking she is intermittently experiencing a popping sensation. Finds taking a Zyrtec helpful but this does not completely resolve symptoms.     Review of Systems   Constitutional:  Negative for fever and weight loss.        + Night sweats   HENT:  Positive for tinnitus.         + Ear fullness (left)   Eyes: Negative.    Respiratory: Negative.     Gastrointestinal: Negative.    Skin:  Negative.    Endo/Heme/Allergies:  Positive for environmental allergies (year round).       Physical Exam  Vitals and nursing note reviewed.   Constitutional:       Appearance: Normal appearance.   HENT:      Head: Normocephalic and atraumatic.      Jaw: There is normal jaw occlusion.      Right Ear: Hearing, tympanic membrane and ear canal normal.      Left Ear: Hearing, tympanic membrane and ear canal normal.      Ears:      Comments: Bilateral ear canals examined under microscope     Nose: Septal deviation (slightly to the left) present. No mucosal edema, congestion or rhinorrhea.      Right Nostril: No occlusion.      Left Nostril: No occlusion.      Right Turbinates: Swollen.      Left Turbinates: Swollen.      Right Sinus: No maxillary sinus tenderness or frontal sinus tenderness.      Left Sinus: No maxillary sinus tenderness or frontal sinus tenderness.      Mouth/Throat:      Mouth: Mucous membranes are moist.      Pharynx: Oropharynx is clear. Uvula midline.   Eyes:      Extraocular Movements: Extraocular movements intact.      Pupils: Pupils are equal, round, and reactive to light.   Lymphadenopathy:      Cervical: Cervical adenopathy (bilateral) present.   Neurological:      Mental Status: She is alert.     Bilateral tonsil stones     AUDIOGRAM: The patient underwent an audiogram most recently 6/18/2025      A/P  This pleasant patient has bilateral eustachian tube dysfunction and cervical lymphadenopathy. Audiogram, Imaging, and Outside records were independently reviewed and discussed in detail with the patient. Medication options discussed, will initiate azelastine (ASTELIN) 0.1% nasal spray at 2 sprays bilaterally BID. An US was requested for further evaluation of lymphadenopathy. If recurrent tonsillitis continues she could consider tonsillectomy in the future. Discussed risk, benefits, complications, and alternatives. May proceed with scopic evaluation at follow up if symptoms persist.     Follow up  in clinic in 2 months.    Scribe/Staff:    Scribe Disclosure:   I, Roopa Kassidy, am serving as a scribe; to document services personally performed by Joe Valenzuela MD based on data collection and the provider's statements to me.     Insert provider disclosure and electronic signature here Provider Disclosure:  I agree with above History, Review of Systems, Physical exam and Plan.  I have reviewed the content of the documentation and have edited it as needed. I have personally performed the services documented here and the documentation accurately represents those services and the decisions I have made.    Joe Valenzuela MD

## 2025-07-17 ENCOUNTER — OFFICE VISIT (OUTPATIENT)
Dept: OTOLARYNGOLOGY | Facility: CLINIC | Age: 40
End: 2025-07-17
Payer: COMMERCIAL

## 2025-07-17 DIAGNOSIS — H69.93 DYSFUNCTION OF BOTH EUSTACHIAN TUBES: Primary | ICD-10-CM

## 2025-07-17 DIAGNOSIS — R59.0 CERVICAL LYMPHADENOPATHY: ICD-10-CM

## 2025-07-17 PROCEDURE — 92504 EAR MICROSCOPY EXAMINATION: CPT | Performed by: OTOLARYNGOLOGY

## 2025-07-17 PROCEDURE — 99203 OFFICE O/P NEW LOW 30 MIN: CPT | Mod: 25 | Performed by: OTOLARYNGOLOGY

## 2025-07-17 RX ORDER — AZELASTINE 1 MG/ML
2 SPRAY, METERED NASAL 2 TIMES DAILY
Qty: 30 ML | Refills: 1 | Status: SHIPPED | OUTPATIENT
Start: 2025-07-17

## 2025-07-17 ASSESSMENT — ENCOUNTER SYMPTOMS
RESPIRATORY NEGATIVE: 1
GASTROINTESTINAL NEGATIVE: 1
WEIGHT LOSS: 0
EYES NEGATIVE: 1
FEVER: 0

## 2025-07-17 NOTE — NURSING NOTE
Lalitha Dey's chief complaint for this visit includes:  Chief Complaint   Patient presents with    Consult     Tinnitus, pressure, plugged, fluid feeling in left ear. Onset 2017 after stopped taking Zyrtec symptoms were worse, but doesn't go away completely with Zyrtec.        PCP: Kiara Morelos    Referring Provider:  Referred Self, MD  No address on file    There were no vitals taken for this visit.

## 2025-07-17 NOTE — LETTER
2025      Lalitha Dey  [address]  [facility] [id number]      Dear Colleague,    Thank you for referring your patient, Lalitha Dey, to the [facility]. Please see a copy of my visit note below.    Chief Complaint   Patient presents with     Consult     Tinnitus, pressure, plugged, fluid feeling in left ear. Onset 2017 after stopped taking Zyrtec symptoms were worse, but doesn't go away completely with Zyrtec.         PCP: Kiara Morelos     Referring Provider: No ref. provider found    There were no vitals taken for this visit.    ENT Problem List:  Patient Active Problem List   Diagnosis     CARDIOVASCULAR SCREENING; LDL GOAL LESS THAN 160     Seasonal allergic rhinitis     Acne     Conjunctivitis, allergic     Dry eyes     Blepharitis     Hypothyroidism     Menometrorrhagia     S/P ectopic pregnancy     Gastroesophageal reflux disease without esophagitis     S/P  section     Encounter for initial prescription of contraceptive pills      delivery delivered     Family history of autoimmune disorder, MS and Lupus in mother     External hemorrhoids     Lipoma of skin and subcutaneous tissue     Anxiety     Breast pain, left     Family history of polyps in the colon     Family history of cancer     Left-sided chest wall pain     Multiple pigmented nevi     Sense of smell altered     Tonsillolith     Hypothyroidism due to Hashimoto's thyroiditis      Current Medications:  Current Outpatient Medications   Medication Sig Dispense Refill     Cetirizine HCl (ZYRTEC PO) Take 10 mg by mouth as needed        levothyroxine (SYNTHROID/LEVOTHROID) 50 MCG tablet Take 1 tablet (50 mcg) by mouth daily. 90 tablet 3     No current facility-administered medications for this visit.     Surgical History:  Past Surgical History:   Procedure Laterality Date     MOLE REMOVAL      Several     NO HISTORY OF SURGERY       SALPINGECTOMY Left 2017    Ectopic pregnancy        Imaging/Labs/Outside Procedures/Balance Testing/Sleep Study/EGD/Etc.  03/13/2023 MR Brain W/O & W Contrast  Provided History: phantom smell, check olfactory area in brain, sinus  disease; Phantosmia.     Comparison: None available.     Technique: Multiplanar T1-weighted, axial FLAIR, and susceptibility  images were obtained without intravenous contrast. Following  intravenous gadolinium-based contrast administration, axial  T2-weighted, diffusion, and T1-weighted images (in multiple planes)  were obtained.     Contrast: 6 mL IV Gadavist      Findings:  There is no mass or abnormal postcontrast enhancement involving the  cribriform plate, olfactory nerves or ethmoid air cells.     There is no mass effect, midline shift, or evidence of intracranial  hemorrhage. The ventricles are proportionate to the cerebral sulci.  Normal major vascular intracranial flow-voids.     Postcontrast images demonstrate no abnormal intracranial enhancement.     No abnormality of the skull marrow signal. The visualized portions of  paranasal sinuses are relatively clear. Normal nasal cavity. Trace  dependent right mastoid effusion. The orbits are grossly unremarkable.                                                                      Impression:  1. No abnormalities of the olfactory nerves, cribriform plate or  ethmoid air cells.  2. No acute intracranial pathology.    HPI  Pleasant 40 year old female presents today as a(n) new patient for tinnitus and left ear fullness. 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 She was also seen by Luigi Rosen 6/18/25 with audiogram performed.     Today she reports experiencing symptoms of left ear fullness and associated tinnitus. Symptoms have been ongoing for several years following discontinuation Zyrtec while pregnant in 2017. States while talking she is intermittently experiencing a popping sensation. Finds taking a Zyrtec helpful but this does not  completely resolve symptoms.     Review of Systems   Constitutional:  Negative for fever and weight loss.        + Night sweats   HENT:  Positive for tinnitus.         + Ear fullness (left)   Eyes: Negative.    Respiratory: Negative.     Gastrointestinal: Negative.    Skin: Negative.    Endo/Heme/Allergies:  Positive for environmental allergies (year round).       Physical Exam  Vitals and nursing note reviewed.   Constitutional:       Appearance: Normal appearance.   HENT:      Head: Normocephalic and atraumatic.      Jaw: There is normal jaw occlusion.      Right Ear: Hearing, tympanic membrane and ear canal normal.      Left Ear: Hearing, tympanic membrane and ear canal normal.      Ears:      Comments: Bilateral ear canals examined under microscope     Nose: Septal deviation (slightly to the left) present. No mucosal edema, congestion or rhinorrhea.      Right Nostril: No occlusion.      Left Nostril: No occlusion.      Right Turbinates: Swollen.      Left Turbinates: Swollen.      Right Sinus: No maxillary sinus tenderness or frontal sinus tenderness.      Left Sinus: No maxillary sinus tenderness or frontal sinus tenderness.      Mouth/Throat:      Mouth: Mucous membranes are moist.      Pharynx: Oropharynx is clear. Uvula midline.   Eyes:      Extraocular Movements: Extraocular movements intact.      Pupils: Pupils are equal, round, and reactive to light.   Lymphadenopathy:      Cervical: Cervical adenopathy (bilateral) present.   Neurological:      Mental Status: She is alert.     Bilateral tonsil stones     AUDIOGRAM: The patient underwent an audiogram most recently 6/18/2025      A/P  This pleasant patient has bilateral eustachian tube dysfunction and cervical lymphadenopathy. Audiogram, Imaging, and Outside records were independently reviewed and discussed in detail with the patient. Medication options discussed, will initiate azelastine (ASTELIN) 0.1% nasal spray at 2 sprays bilaterally BID. An US was  requested for further evaluation of lymphadenopathy. If recurrent tonsillitis continues she could consider tonsillectomy in the future. Discussed risk, benefits, complications, and alternatives. May proceed with scopic evaluation at follow up if symptoms persist.     Follow up in clinic in 2 months.    Scribe/Staff:    Scribe Disclosure:   I, Roopa Yi, am serving as a scribe; to document services personally performed by Joe Valenzuela MD based on data collection and the provider's statements to me.     Insert provider disclosure and electronic signature here Provider Disclosure:  I agree with above History, Review of Systems, Physical exam and Plan.  I have reviewed the content of the documentation and have edited it as needed. I have personally performed the services documented here and the documentation accurately represents those services and the decisions I have made.    Joe Valenzuela MD         Again, thank you for allowing me to participate in the care of your patient.        Sincerely,        Joe Valenzuela MD    Electronically signed

## 2025-07-18 ENCOUNTER — ANCILLARY PROCEDURE (OUTPATIENT)
Dept: ULTRASOUND IMAGING | Facility: CLINIC | Age: 40
End: 2025-07-18
Attending: OTOLARYNGOLOGY
Payer: COMMERCIAL

## 2025-07-18 DIAGNOSIS — R59.0 CERVICAL LYMPHADENOPATHY: ICD-10-CM

## 2025-07-18 PROCEDURE — 76536 US EXAM OF HEAD AND NECK: CPT | Performed by: RADIOLOGY

## 2025-08-18 ENCOUNTER — ANCILLARY PROCEDURE (OUTPATIENT)
Dept: MRI IMAGING | Facility: CLINIC | Age: 40
End: 2025-08-18
Attending: INTERNAL MEDICINE
Payer: COMMERCIAL

## 2025-08-18 DIAGNOSIS — R92.30 DENSE BREAST TISSUE ON MAMMOGRAM, UNSPECIFIED TYPE: ICD-10-CM

## 2025-08-18 DIAGNOSIS — Z12.31 ENCOUNTER FOR SCREENING MAMMOGRAM FOR BREAST CANCER: ICD-10-CM

## 2025-08-18 PROCEDURE — A9585 GADOBUTROL INJECTION: HCPCS

## 2025-08-18 PROCEDURE — 77049 MRI BREAST C-+ W/CAD BI: CPT

## 2025-08-18 RX ORDER — GADOBUTROL 604.72 MG/ML
5.5 INJECTION INTRAVENOUS ONCE
Status: COMPLETED | OUTPATIENT
Start: 2025-08-18 | End: 2025-08-18

## 2025-08-18 RX ADMIN — GADOBUTROL 5.5 ML: 604.72 INJECTION INTRAVENOUS at 13:48

## 2025-08-20 ENCOUNTER — RESULTS FOLLOW-UP (OUTPATIENT)
Dept: FAMILY MEDICINE | Facility: CLINIC | Age: 40
End: 2025-08-20
Payer: COMMERCIAL